# Patient Record
Sex: FEMALE | Race: BLACK OR AFRICAN AMERICAN | URBAN - METROPOLITAN AREA
[De-identification: names, ages, dates, MRNs, and addresses within clinical notes are randomized per-mention and may not be internally consistent; named-entity substitution may affect disease eponyms.]

---

## 2017-01-18 RX ORDER — FUROSEMIDE 20 MG/1
TABLET ORAL
Qty: 90 TAB | Refills: 2 | Status: SHIPPED | OUTPATIENT
Start: 2017-01-18 | End: 2017-12-14 | Stop reason: SDUPTHER

## 2017-01-18 RX ORDER — PANTOPRAZOLE SODIUM 40 MG/1
40 TABLET, DELAYED RELEASE ORAL DAILY
Qty: 90 TAB | Refills: 2 | Status: SHIPPED | OUTPATIENT
Start: 2017-01-18 | End: 2017-10-19 | Stop reason: SDUPTHER

## 2017-02-21 ENCOUNTER — OFFICE VISIT (OUTPATIENT)
Dept: FAMILY MEDICINE CLINIC | Age: 62
End: 2017-02-21

## 2017-02-21 VITALS
WEIGHT: 194.6 LBS | TEMPERATURE: 97.4 F | DIASTOLIC BLOOD PRESSURE: 70 MMHG | HEART RATE: 70 BPM | RESPIRATION RATE: 22 BRPM | HEIGHT: 63 IN | SYSTOLIC BLOOD PRESSURE: 128 MMHG | BODY MASS INDEX: 34.48 KG/M2 | OXYGEN SATURATION: 98 %

## 2017-02-21 DIAGNOSIS — Z11.59 NEED FOR HEPATITIS C SCREENING TEST: ICD-10-CM

## 2017-02-21 DIAGNOSIS — Z23 ENCOUNTER FOR IMMUNIZATION: ICD-10-CM

## 2017-02-21 DIAGNOSIS — Z13.39 SCREENING FOR ALCOHOLISM: ICD-10-CM

## 2017-02-21 DIAGNOSIS — Z12.31 ENCOUNTER FOR SCREENING MAMMOGRAM FOR MALIGNANT NEOPLASM OF BREAST: ICD-10-CM

## 2017-02-21 DIAGNOSIS — E55.9 HYPOVITAMINOSIS D: ICD-10-CM

## 2017-02-21 DIAGNOSIS — Z13.1 SCREENING FOR DIABETES MELLITUS: ICD-10-CM

## 2017-02-21 DIAGNOSIS — Z13.6 SCREENING FOR ISCHEMIC HEART DISEASE: ICD-10-CM

## 2017-02-21 DIAGNOSIS — Z00.00 ROUTINE GENERAL MEDICAL EXAMINATION AT A HEALTH CARE FACILITY: Primary | ICD-10-CM

## 2017-02-21 DIAGNOSIS — Z71.89 ADVANCE CARE PLANNING: ICD-10-CM

## 2017-02-21 RX ORDER — DIFLUPREDNATE 0.5 MG/ML
1 EMULSION OPHTHALMIC
COMMUNITY
End: 2018-09-18 | Stop reason: ALTCHOICE

## 2017-02-21 NOTE — PROGRESS NOTES
Chuyita Bennett is a 62 y.o. female  Chief Complaint   Patient presents with   Jeremy Mendoza Annual Wellness Visit       1. Have you been to the ER, urgent care clinic since your last visit? Hospitalized since your last visit? No    2. Have you seen or consulted any other health care providers outside of the [address] [place] [address] since your last visit? Include any pap smears or colon screening.  No

## 2017-02-21 NOTE — PATIENT INSTRUCTIONS
Medicare Part B Preventive Services Limitations Recommendation Scheduled   Bone Mass Measurement  (age 72 & older, biennial) Requires diagnosis related to osteoporosis or estrogen deficiency. Biennial benefit unless patient has history of long-term glucocorticoid tx or baseline is needed because initial test was by other method     Cardiovascular Screening Blood Tests (every 5 years)  Total cholesterol, HDL, Triglycerides Order as a panel if possible     Colorectal Cancer Screening  -Fecal occult blood test (annual)  -Flexible sigmoidoscopy (5y)  -Screening colonoscopy (10y)  -Barium Enema      Counseling to Prevent Tobacco Use (up to 8 sessions per year)  - Counseling greater than 3 and up to 10 minutes  - Counseling greater than 10 minutes Patients must be asymptomatic of tobacco-related conditions to receive as preventive service     Diabetes Screening Tests (at least every 3 years, Medicare covers annually or at 6-month intervals for prediabetic patients)    Fasting blood sugar (FBS) or glucose tolerance test (GTT) Patient must be diagnosed with one of the following:  -Hypertension, Dyslipidemia, obesity, previous impaired FBS or GTT  Or any two of the following: overweight, FH of diabetes, age ? 72, history of gestational diabetes, birth of baby weighing more than 9 pounds     Diabetes Self-Management Training (DSMT) (no USPSTF recommendation) Requires referral by treating physician for patient with diabetes or renal disease. 10 hours of initial DSMT session of no less than 30 minutes each in a continuous 12-month period. 2 hours of follow-up DSMT in subsequent years.      Glaucoma Screening (no USPSTF recommendation) Diabetes mellitus, family history, , age 48 or over,  American, age 72 or over     Human Immunodeficiency Virus (HIV) Screening (annually for increased risk patients)  HIV-1 and HIV-2 by EIA, GAL, rapid antibody test, or oral mucosa transudate Patient must be at increased risk for HIV infection per USPSTF guidelines or pregnant. Tests covered annually for patients at increased risk. Pregnant patients may receive up to 3 test during pregnancy. Medical Nutrition Therapy (MNT) (for diabetes or renal disease not recommended schedule) Requires referral by treating physician for patient with diabetes or renal disease. Can be provided in same year as diabetes self-management training (DSMT), and CMS recommends medical nutrition therapy take place after DSMT. Up to 3 hours for initial year and 2 hours in subsequent years. Shingles Vaccination A shingles vaccine is also recommended once in a lifetime after age 61     Seasonal Influenza Vaccination (annually)      Pneumococcal Vaccination (once after 72)      Hepatitis B Vaccinations (if medium/high risk) Medium/high risk factors:  End-stage renal disease,  Hemophiliacs who received Factor VIII or IX concentrates, Clients of institutions for the mentally retarded, Persons who live in the same house as a HepB virus carrier, Homosexual men, Illicit injectable drug abusers. Screening Mammography (biennial age 54-69) Annually (age 36 or over)     Screening Pap Tests and Pelvic Examination (up to age 79 and after 79 if unknown history or abnormal study last 10 years) Every 25 months except high risk     Ultrasound Screening for Abdominal Aortic Aneurysm (AAA) (once) Patient must be referred through [facility] and not have had a screening for abdominal aortic aneurysm before under Medicare.   Limited to patients who meet one of the following criteria:  - Men who are 73-68 years old and have smoked more than 100 cigarettes in their lifetime.  -Anyone with a FH of AAA  -Anyone recommended for screening by USPSTF

## 2017-02-21 NOTE — PROGRESS NOTES
This is a Subsequent Medicare Annual Wellness Visit providing Personalized Prevention Plan Services (PPPS) (Performed 12 months after initial AWV and PPPS )    I have reviewed the patient's medical history in detail and updated the computerized patient record. History     Past Medical History   Diagnosis Date    Advance care planning 2/17/2016     Discussed with pt today and handout given to pt to complete. 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 No pertinent past surgical history. Current Outpatient Prescriptions   Medication Sig Dispense Refill    bromfenac (PROLENSA) 0.07 % ophthalmic solution Administer 1 Drop to both eyes daily.  Difluprednate (DUREZOL) 0.05 % ophthalmic emulsion Administer 1 Drop to both eyes.  raNITIdine (ZANTAC) 150 mg tablet TAKE ONE TABLET BY MOUTH IN THE EVENING 90 Tab 2    furosemide (LASIX) 20 mg tablet TAKE ONE TABLET BY MOUTH ONCE DAILY 90 Tab 2    pantoprazole (PROTONIX) 40 mg tablet Take 1 Tab by mouth daily.  90 Tab 2    TOVIAZ 8 mg ER tablet TAKE ONE TABLET BY MOUTH ONCE DAILY 90 Tab 2    KLOR-CON M20 20 mEq tablet TAKE ONE TABLET BY MOUTH ONCE DAILY 90 Tab 2    meloxicam (MOBIC) 15 mg tablet TAKE ONE TABLET BY MOUTH ONCE DAILY 90 Tab 2    VOLTAREN 1 % gel APPLY 4G TO AFFECTED AREA FOUR TIMES DAILY 400 g 2    fluocinoNIDE (LIDEX) 0.05 % topical cream APPLY  CREAM TOPICALLY TO AFFECTED AREA TWICE DAILY 45 g 1    ammonium lactate (AMLACTIN) 12 % topical cream APPLY CREAM TOPICALLY TO AFFECTED AREA TWICE DAILY. (RUB IN AFFECTED AREA WELL) 385 g 2    CRESTOR 10 mg tablet TAKE ONE TABLET BY MOUTH AT BEDTIME 90 Tab 2    carvedilol (COREG) 25 mg tablet TAKE ONE TABLET BY MOUTH IN THE MORNING AND ONE-HALF IN THE EVENING WITH MEALS 135 Tab 2    levalbuterol tartrate (XOPENEX) 45 mcg/actuation inhaler Take 2 Puffs by inhalation every six (6) hours as needed for Wheezing. 1 Inhaler 2    levalbuterol (XOPENEX) 0.63 mg/3 mL nebu 3 mL by Nebulization route four (4) times daily as needed. Dx 493.9,786.05, 135 1 Package 3    cyclobenzaprine (FLEXERIL) 5 mg tablet Take 1 Tab by mouth three (3) times daily as needed for Muscle Spasm(s). 40 Tab 1    BIOTIN PO Take  by mouth daily.  losartan (COZAAR) 50 mg tablet Take 25 mg by mouth daily.  NITROSTAT 0.4 mg SL tablet DISSOLVE ONE TABLET UNDER THE TONGUE EVERY 5 MINUTES AS NEEDED FOR CHEST PAIN. DO NOT EXCEED A TOTAL OF 3 DOSES IN 15 MINUTES 50 tablet 0    fluticasone (FLONASE) 50 mcg/actuation nasal spray 2 Sprays by Both Nostrils route daily. 3 Bottle 3    fexofenadine (ALLEGRA) 180 mg tablet Take  by mouth daily.  ipratropium (ATROVENT) 0.02 % nebulizer solution 2.5 mL by Nebulization route as needed for Wheezing. 2.5 mL 0    cholecalciferol, vitamin d3, (VITAMIN D) 1,000 unit tablet Take 2,000 Units by mouth daily.  aspirin delayed-release 81 mg tablet Take 81 mg by mouth daily.  multivitamin (ONE A DAY) tablet Take 1 Tab by mouth daily.  DOCOSAHEXANOIC ACID/EPA (FISH OIL PO) Take  by mouth daily. 4 tabs daily.  cyclosporin (RESTASIS) 0.05 % ophthalmic emulsion Administer 1 Drop to both eyes two (2) times a day. Allergies   Allergen Reactions    Asa-Acetaminophen-Caff-Potass Other (comments)     GI upset    Bextra [Valdecoxib] Other (comments)     GI upset    Celebrex [Celecoxib] Other (comments)     GI upset    Naproxen Rash    Pcn [Penicillins] Swelling    Sulfur Rash     History reviewed.  No pertinent family history. Social History   Substance Use Topics    Smoking status: Former Smoker     Quit date: 8/1/1986    Smokeless tobacco: Former User    Alcohol use No     Patient Active Problem List   Diagnosis Code    Arthritis M19.90    CAD (coronary artery disease) I25.10    GERD (gastroesophageal reflux disease) K21.9    Sarcoidosis ([address] D86.9    Ganglion cyst of wrist M67.439    Posterior vitreous detachment H43.819    Hypercholesterolemia E78.00    Urinary incontinence R32    Allergy, unspecified not elsewhere classified T78.40XA    CHF (congestive heart failure) ([facility]) I50.9    HTN (hypertension) I10    Atlanto-axial subluxation S13.120A    RAD (reactive airway disease) J45.909    Advance care planning Z71.89       Depression Risk Factor Screening:     PHQ 2 / 9, over the last two weeks 2/21/2017   Little interest or pleasure in doing things Not at all   Feeling down, depressed or hopeless Not at all   Total Score PHQ 2 0     Alcohol Risk Factor Screening: On any occasion during the past 3 months, have you had more than 3 drinks containing alcohol? No    Do you average more than 7 drinks per week? No      Functional Ability and Level of Safety:     Hearing Loss   none    Activities of Daily Living   Self-care. Requires assistance with: no ADLs    Fall Risk   No flowsheet data found.   Abuse Screen   Patient is not abused    Review of Systems   Constitutional: negative  Eyes: negative  Ears, nose, mouth, throat, and face: negative  Respiratory: negative  Cardiovascular: negative  Gastrointestinal: negative  Genitourinary:negative  Integument/breast: negative  Hematologic/lymphatic: negative  Musculoskeletal:positive for arthralgias and stiff joints  Neurological: negative  Behavioral/Psych: negative  Endocrine: negative  Allergic/Immunologic: negative    Physical Examination     Evaluation of Cognitive Function:  Mood/affect:  happy  Appearance: age appropriate  Family member/caregiver input: none    Visit Vitals    /70 (BP 1 Location: Right arm, BP Patient Position: Sitting)    Pulse 70    Temp 97.4 °F (36.3 °C) (Oral)    Resp 22    Ht 5' 3\" (1.6 m)    Wt 194 lb 9.6 oz (88.3 kg)    SpO2 98%    BMI 34.47 kg/m2     General appearance: alert, cooperative, no distress, appears stated age  Ears: normal TM's and external ear canals AU  Throat: Lips, mucosa, and tongue normal. Teeth and gums normal  Neck: supple, symmetrical, trachea midline, no adenopathy, thyroid: not enlarged, symmetric, no tenderness/mass/nodules, no carotid bruit and no JVD  Back: symmetric, no curvature. ROM normal. No CVA tenderness. Lungs: clear to auscultation bilaterally  Heart: regular rate and rhythm, S1, S2 normal, no murmur, click, rub or gallop  Abdomen: soft, non-tender. Bowel sounds normal. No masses,  no organomegaly  Extremities: extremities normal, atraumatic, no cyanosis or edema    Patient Care Team:  Janice Frances MD as PCP - General  Kaleigh Sarmiento MD as Physician (Rheumatology)    Advice/Referrals/Counseling   Education and counseling provided:  Are appropriate based on today's review and evaluation    Assessment/Plan       ICD-10-CM ICD-9-CM    1. Routine general medical examination at a health care facility Z00.00 V70.0    2. Advance care planning Z71.89 V65.49    3. Screening for alcoholism Z13.89 V79.1    4. Encounter for immunization Z23 V03.89    5. Screening for diabetes mellitus Z13.1 V77.1    6. Screening for ischemic heart disease Z13.6 V81.0    7. Encounter for screening mammogram for malignant neoplasm of breast Z12.31 V76.12 [facility] MAMMO BI SCREENING INCL CAD   8. Hypovitaminosis D E55.9 268.9 VITAMIN D, 25 HYDROXY   9. Need for hepatitis C screening test Z11.59 V73.89 HEPATITIS C AB   .     Advance Care Planning (ACP) Provider Conversation Snapshot    Date of ACP Conversation: 02/21/17  Persons included in Conversation:  patient  Length of ACP Conversation in minutes:  <16 minutes (Non-Billable)    Authorized Decision Maker (if patient is incapable of making informed decisions):    This person is:   Healthcare Agent/Medical Power of  under Advance Directive  Her daughter and son            Fernando Smithsvetlana Outcomes / Follow-Up Plan:   Recommended completion of Advance Directive form after review of ACP materials and conversation with prospective healthcare agent

## 2017-02-21 NOTE — MR AVS SNAPSHOT
Visit Information Date & Time Provider Department Dept. Phone Encounter #  
 2/21/2017  8:15 AM Valentino Freed, [facility] [phone] [id number] Follow-up Instructions Return in 4 months (on 6/21/2017). Upcoming Health Maintenance Date Due Hepatitis C Screening 1955 BREAST CANCER SCRN MAMMOGRAM 3/22/2017 DTaP/Tdap/Td series (2 - Td) 2/24/2025 COLONOSCOPY 5/31/2026 Allergies as of 2/21/2017  Review Complete On: 2/21/2017 By: Valentino Freed, MD  
  
 Severity Noted Reaction Type Reactions Asa-acetaminophen-caff-potass  04/08/2010    Other (comments) GI upset Bextra [Valdecoxib]  04/08/2010    Other (comments) GI upset Celebrex [Celecoxib]  04/08/2010    Other (comments) GI upset Naproxen  11/11/2014    Rash Pcn [Penicillins]  04/08/2010    Swelling Sulfur  04/08/2010    Rash Current Immunizations  Reviewed on 2/21/2017 Name Date Influenza Vaccine 10/9/2015, 10/15/2014, 10/30/2013 Influenza Vaccine (Quad) PF 10/25/2016  3:39 PM  
 Influenza Vaccine Split 10/11/2012 Influenza Vaccine Whole 11/24/2010 Pneumococcal Polysaccharide (PPSV-23) 11/16/2016 Tdap 2/24/2015 Reviewed by Valentino Freed, MD on 2/21/2017 at  8:26 AM  
 Reviewed by Valentino Freed, MD on 2/21/2017 at  8:36 AM  
You Were Diagnosed With   
  
 Codes Comments Routine general medical examination at a health care facility    -  Primary ICD-10-CM: Z00.00 ICD-9-CM: V70.0 Advance care planning     ICD-10-CM: Z71.89 ICD-9-CM: V65.49 Screening for alcoholism     ICD-10-CM: Z13.89 ICD-9-CM: V79.1 Encounter for immunization     ICD-10-CM: D07 ICD-9-CM: V03.89 Screening for diabetes mellitus     ICD-10-CM: Z13.1 ICD-9-CM: V77.1 Screening for ischemic heart disease     ICD-10-CM: Z13.6 ICD-9-CM: V81.0  Encounter for screening mammogram for malignant neoplasm of breast     ICD-10-CM: Z12.31 
 ICD-9-CM: I18.22 Hypovitaminosis D     ICD-10-CM: E55.9 ICD-9-CM: 268.9 Need for hepatitis C screening test     ICD-10-CM: Z11.59 
ICD-9-CM: V73.89 Vitals BP Pulse Temp Resp Height(growth percentile) Weight(growth percentile) 128/70 (BP 1 Location: Right arm, BP Patient Position: Sitting) 70 97.4 °F (36.3 °C) (Oral) 22 5' 3\" (1.6 m) 194 lb 9.6 oz (88.3 kg) SpO2 BMI OB Status Smoking Status 98% 34.47 kg/m2 Hysterectomy Former Smoker Vitals History BMI and BSA Data Body Mass Index Body Surface Area 34.47 kg/m 2 1.98 m 2 Preferred Pharmacy Pharmacy Name Phone [facility] [address],  [address] Your Updated Medication List  
  
   
This list is accurate as of: 2/21/17  8:47 AM.  Always use your most recent med list. ALLEGRA 180 mg tablet Generic drug:  fexofenadine Take  by mouth daily. ammonium lactate 12 % topical cream  
Commonly known as:  AMLACTIN  
APPLY CREAM TOPICALLY TO AFFECTED AREA TWICE DAILY. (RUB IN AFFECTED AREA WELL) aspirin delayed-release 81 mg tablet Take 81 mg by mouth daily. BIOTIN PO Take  by mouth daily. carvedilol 25 mg tablet Commonly known as:  COREG  
TAKE ONE TABLET BY MOUTH IN THE MORNING AND ONE-HALF IN THE EVENING WITH MEALS  
  
 CRESTOR 10 mg tablet Generic drug:  rosuvastatin TAKE ONE TABLET BY MOUTH AT BEDTIME  
  
 cyclobenzaprine 5 mg tablet Commonly known as:  FLEXERIL Take 1 Tab by mouth three (3) times daily as needed for Muscle Spasm(s). DUREZOL 0.05 % ophthalmic emulsion Generic drug:  Difluprednate Administer 1 Drop to both eyes. FISH OIL PO Take  by mouth daily. 4 tabs daily. fluocinoNIDE 0.05 % topical cream  
Commonly known as:  LIDEX APPLY  CREAM TOPICALLY TO AFFECTED AREA TWICE DAILY  
  
 fluticasone 50 mcg/actuation nasal spray Commonly known as:  Lennice Boss 2 Sprays by Both Nostrils route daily. furosemide 20 mg tablet Commonly known as:  LASIX TAKE ONE TABLET BY MOUTH ONCE DAILY  
  
 ipratropium 0.02 % nebulizer solution Commonly known as:  ATROVENT  
2.5 mL by Nebulization route as needed for Wheezing. KLOR-CON M20 20 mEq tablet Generic drug:  potassium chloride TAKE ONE TABLET BY MOUTH ONCE DAILY  
  
 levalbuterol 0.63 mg/3 mL Nebu Commonly known as:  XOPENEX  
3 mL by Nebulization route four (4) times daily as needed. Dx 493.9,786.05, 135  
  
 levalbuterol tartrate 45 mcg/actuation inhaler Commonly known as:  Maci Area Take 2 Puffs by inhalation every six (6) hours as needed for Wheezing. losartan 50 mg tablet Commonly known as:  COZAAR Take 25 mg by mouth daily. meloxicam 15 mg tablet Commonly known as:  MOBIC  
TAKE ONE TABLET BY MOUTH ONCE DAILY  
  
 multivitamin tablet Commonly known as:  ONE A DAY Take 1 Tab by mouth daily. NITROSTAT 0.4 mg SL tablet Generic drug:  nitroglycerin DISSOLVE ONE TABLET UNDER THE TONGUE EVERY 5 MINUTES AS NEEDED FOR CHEST PAIN. DO NOT EXCEED A TOTAL OF 3 DOSES IN 15 MINUTES  
  
 pantoprazole 40 mg tablet Commonly known as:  PROTONIX Take 1 Tab by mouth daily. pneumococcal 13 merari conj dip 0.5 mL Syrg injection Commonly known as:  PREVNAR 13 (PF)  
0.5 mL by IntraMUSCular route once for 1 dose. PROLENSA 0.07 % ophthalmic solution Generic drug:  bromfenac Administer 1 Drop to both eyes daily. raNITIdine 150 mg tablet Commonly known as:  ZANTAC TAKE ONE TABLET BY MOUTH IN THE EVENING  
  
 RESTASIS 0.05 % ophthalmic emulsion Generic drug:  cycloSPORINE Administer 1 Drop to both eyes two (2) times a day. TOVIAZ 8 mg ER tablet Generic drug:  fesoterodine TAKE ONE TABLET BY MOUTH ONCE DAILY  
  
 VITAMIN D3 1,000 unit tablet Generic drug:  cholecalciferol Take 2,000 Units by mouth daily. VOLTAREN 1 % Gel Generic drug:  diclofenac APPLY 4G TO AFFECTED AREA FOUR TIMES DAILY Prescriptions Printed Refills  
 pneumococcal 13 merari conj dip (PREVNAR 13, PF,) 0.5 mL syrg injection 0 Si.5 mL by IntraMUSCular route once for 1 dose. Class: Print Route: IntraMUSCular Follow-up Instructions Return in 4 months (on 2017). To-Do List   
 2017 Lab:  HEPATITIS C AB   
  
 2017 Lab:  VITAMIN D, 25 HYDROXY   
  
 2017 Imaging:  [facility] MAMMO BI SCREENING INCL CAD Patient Instructions Medicare Part B Preventive Services Limitations Recommendation Scheduled Bone Mass Measurement 
(age 72 & older, biennial) Requires diagnosis related to osteoporosis or estrogen deficiency. Biennial benefit unless patient has history of long-term glucocorticoid tx or baseline is needed because initial test was by other method Cardiovascular Screening Blood Tests (every 5 years) Total cholesterol, HDL, Triglycerides Order as a panel if possible Colorectal Cancer Screening 
-Fecal occult blood test (annual) -Flexible sigmoidoscopy (5y) 
-Screening colonoscopy (10y) -Barium Enema Counseling to Prevent Tobacco Use (up to 8 sessions per year) - Counseling greater than 3 and up to 10 minutes - Counseling greater than 10 minutes Patients must be asymptomatic of tobacco-related conditions to receive as preventive service Diabetes Screening Tests (at least every 3 years, Medicare covers annually or at 6-month intervals for prediabetic patients) Fasting blood sugar (FBS) or glucose tolerance test (GTT) Patient must be diagnosed with one of the following: 
-Hypertension, Dyslipidemia, obesity, previous impaired FBS or GTT 
Or any two of the following: overweight, FH of diabetes, age ? 72, history of gestational diabetes, birth of baby weighing more than 9 pounds Diabetes Self-Management Training (DSMT) (no USPSTF recommendation) Requires referral by treating physician for patient with diabetes or renal disease. 10 hours of initial DSMT session of no less than 30 minutes each in a continuous 12-month period. 2 hours of follow-up DSMT in subsequent years. Glaucoma Screening (no USPSTF recommendation) Diabetes mellitus, family history, , age 48 or over,  American, age 72 or over Human Immunodeficiency Virus (HIV) Screening (annually for increased risk patients) HIV-1 and HIV-2 by EIA, GAL, rapid antibody test, or oral mucosa transudate Patient must be at increased risk for HIV infection per USPSTF guidelines or pregnant. Tests covered annually for patients at increased risk. Pregnant patients may receive up to 3 test during pregnancy. Medical Nutrition Therapy (MNT) (for diabetes or renal disease not recommended schedule) Requires referral by treating physician for patient with diabetes or renal disease. Can be provided in same year as diabetes self-management training (DSMT), and CMS recommends medical nutrition therapy take place after DSMT. Up to 3 hours for initial year and 2 hours in subsequent years. Shingles Vaccination A shingles vaccine is also recommended once in a lifetime after age 61 Seasonal Influenza Vaccination (annually) Pneumococcal Vaccination (once after 65) Hepatitis B Vaccinations (if medium/high risk) Medium/high risk factors:  End-stage renal disease, Hemophiliacs who received Factor VIII or IX concentrates, Clients of institutions for the mentally retarded, Persons who live in the same house as a HepB virus carrier, Homosexual men, Illicit injectable drug abusers. Screening Mammography (biennial age 54-69) Annually (age 36 or over) Screening Pap Tests and Pelvic Examination (up to age 79 and after 79 if unknown history or abnormal study last 10 years) Every 24 months except high risk Ultrasound Screening for Abdominal Aortic Aneurysm (AAA) (once) Patient must be referred through [facility] and not have had a screening for abdominal aortic aneurysm before under Medicare. Limited to patients who meet one of the following criteria: 
- Men who are 73-68 years old and have smoked more than 100 cigarettes in their lifetime. 
-Anyone with a FH of AAA 
-Anyone recommended for screening by USPSTF Introducing [facility] SERVICES! Dear Mark Peterson: Thank you for requesting a [employer] account. Our records indicate that you already have an active [employer] account. You can access your account anytime at [email or web address] Did you know that you can access your hospital and ER discharge instructions at any time in [employer]? You can also review all of your test results from your hospital stay or ER visit. Additional Information If you have questions, please visit the Frequently Asked Questions section of the [employer] website at [email or web address] Remember, [employer] is NOT to be used for urgent needs. For medical emergencies, dial 911. Now available from your iPhone and Android! Please provide this summary of care documentation to your next provider. Your primary care clinician is listed as Isreal [facility]. If you have any questions after today's visit, please call [phone].

## 2017-03-09 ENCOUNTER — HOSPITAL ENCOUNTER (OUTPATIENT)
Dept: LAB | Age: 62
Discharge: HOME | End: 2017-03-09
Payer: MEDICARE

## 2017-03-09 DIAGNOSIS — E55.9 HYPOVITAMINOSIS D: ICD-10-CM

## 2017-03-09 DIAGNOSIS — E78.00 HYPERCHOLESTEROLEMIA: ICD-10-CM

## 2017-03-09 DIAGNOSIS — Z11.59 NEED FOR HEPATITIS C SCREENING TEST: ICD-10-CM

## 2017-03-09 LAB
25(OH)D3 SERPL-MCNC: 50.9 NG/ML (ref 30–100)
ALBUMIN SERPL BCP-MCNC: 3.4 G/DL (ref 3.4–5)
ALBUMIN/GLOB SERPL: 0.8 {RATIO} (ref 0.8–1.7)
ALP SERPL-CCNC: 78 U/L (ref 45–117)
ALT SERPL-CCNC: 35 U/L (ref 13–56)
ANION GAP BLD CALC-SCNC: 6 MMOL/L (ref 3–18)
APPEARANCE UR: ABNORMAL
AST SERPL W P-5'-P-CCNC: 28 U/L (ref 15–37)
BACTERIA URNS QL MICRO: ABNORMAL /HPF
BASOPHILS # BLD AUTO: 0 K/UL (ref 0–0.06)
BASOPHILS # BLD: 0 % (ref 0–2)
BILIRUB DIRECT SERPL-MCNC: 0.3 MG/DL (ref 0–0.2)
BILIRUB SERPL-MCNC: 0.8 MG/DL (ref 0.2–1)
BILIRUB UR QL: NEGATIVE
BUN SERPL-MCNC: 19 MG/DL (ref 7–18)
BUN/CREAT SERPL: 19 (ref 12–20)
CALCIUM SERPL-MCNC: 8.5 MG/DL (ref 8.5–10.1)
CHLORIDE SERPL-SCNC: 102 MMOL/L (ref 100–108)
CHOLEST SERPL-MCNC: 120 MG/DL
CO2 SERPL-SCNC: 30 MMOL/L (ref 21–32)
COLOR UR: YELLOW
CREAT SERPL-MCNC: 1.02 MG/DL (ref 0.6–1.3)
DIFFERENTIAL METHOD BLD: ABNORMAL
EOSINOPHIL # BLD: 0.3 K/UL (ref 0–0.4)
EOSINOPHIL NFR BLD: 10 % (ref 0–5)
EPITH CASTS URNS QL MICRO: ABNORMAL /LPF (ref 0–5)
ERYTHROCYTE [DISTWIDTH] IN BLOOD BY AUTOMATED COUNT: 13.6 % (ref 11.6–14.5)
GLOBULIN SER CALC-MCNC: 4.1 G/DL (ref 2–4)
GLUCOSE SERPL-MCNC: 90 MG/DL (ref 74–99)
GLUCOSE UR STRIP.AUTO-MCNC: NEGATIVE MG/DL
HCT VFR BLD AUTO: 38.7 % (ref 35–45)
HDLC SERPL-MCNC: 61 MG/DL (ref 40–60)
HDLC SERPL: 2 {RATIO} (ref 0–5)
HGB BLD-MCNC: 12.3 G/DL (ref 12–16)
HGB UR QL STRIP: NEGATIVE
KETONES UR QL STRIP.AUTO: NEGATIVE MG/DL
LDLC SERPL CALC-MCNC: 52.2 MG/DL (ref 0–100)
LEUKOCYTE ESTERASE UR QL STRIP.AUTO: ABNORMAL
LIPID PROFILE,FLP: ABNORMAL
LYMPHOCYTES # BLD AUTO: 28 % (ref 21–52)
LYMPHOCYTES # BLD: 0.9 K/UL (ref 0.9–3.6)
MCH RBC QN AUTO: 28.7 PG (ref 24–34)
MCHC RBC AUTO-ENTMCNC: 31.8 G/DL (ref 31–37)
MCV RBC AUTO: 90.4 FL (ref 74–97)
MONOCYTES # BLD: 0.4 K/UL (ref 0.05–1.2)
MONOCYTES NFR BLD AUTO: 12 % (ref 3–10)
NEUTS SEG # BLD: 1.6 K/UL (ref 1.8–8)
NEUTS SEG NFR BLD AUTO: 50 % (ref 40–73)
NITRITE UR QL STRIP.AUTO: POSITIVE
PH UR STRIP: 5.5 [PH] (ref 5–8)
PLATELET # BLD AUTO: 193 K/UL (ref 135–420)
PMV BLD AUTO: 10.5 FL (ref 9.2–11.8)
POTASSIUM SERPL-SCNC: 4.7 MMOL/L (ref 3.5–5.5)
PROT SERPL-MCNC: 7.5 G/DL (ref 6.4–8.2)
PROT UR STRIP-MCNC: NEGATIVE MG/DL
RBC # BLD AUTO: 4.28 M/UL (ref 4.2–5.3)
RBC #/AREA URNS HPF: 0 /HPF (ref 0–5)
SODIUM SERPL-SCNC: 138 MMOL/L (ref 136–145)
SP GR UR REFRACTOMETRY: 1.01 (ref 1–1.03)
T4 FREE SERPL-MCNC: 1.2 NG/DL (ref 0.7–1.5)
TRIGL SERPL-MCNC: 34 MG/DL (ref ?–150)
TSH SERPL DL<=0.05 MIU/L-ACNC: 1.08 UIU/ML (ref 0.36–3.74)
UROBILINOGEN UR QL STRIP.AUTO: 0.2 EU/DL (ref 0.2–1)
VLDLC SERPL CALC-MCNC: 6.8 MG/DL
WBC # BLD AUTO: 3.2 K/UL (ref 4.6–13.2)
WBC URNS QL MICRO: ABNORMAL /HPF (ref 0–4)
YEAST URNS QL MICRO: ABNORMAL

## 2017-03-09 PROCEDURE — 84439 ASSAY OF FREE THYROXINE: CPT | Performed by: INTERNAL MEDICINE

## 2017-03-09 PROCEDURE — 36415 COLL VENOUS BLD VENIPUNCTURE: CPT | Performed by: INTERNAL MEDICINE

## 2017-03-09 PROCEDURE — 80076 HEPATIC FUNCTION PANEL: CPT | Performed by: INTERNAL MEDICINE

## 2017-03-09 PROCEDURE — 80061 LIPID PANEL: CPT | Performed by: INTERNAL MEDICINE

## 2017-03-09 PROCEDURE — 86803 HEPATITIS C AB TEST: CPT | Performed by: INTERNAL MEDICINE

## 2017-03-09 PROCEDURE — 84443 ASSAY THYROID STIM HORMONE: CPT | Performed by: INTERNAL MEDICINE

## 2017-03-09 PROCEDURE — 85025 COMPLETE CBC W/AUTO DIFF WBC: CPT | Performed by: INTERNAL MEDICINE

## 2017-03-09 PROCEDURE — 81001 URINALYSIS AUTO W/SCOPE: CPT | Performed by: INTERNAL MEDICINE

## 2017-03-09 PROCEDURE — 80048 BASIC METABOLIC PNL TOTAL CA: CPT | Performed by: INTERNAL MEDICINE

## 2017-03-09 PROCEDURE — 82306 VITAMIN D 25 HYDROXY: CPT | Performed by: INTERNAL MEDICINE

## 2017-03-10 LAB
HCV AB SER IA-ACNC: 0.09 INDEX
HCV AB SERPL QL IA: NEGATIVE
HCV COMMENT,HCGAC: NORMAL

## 2017-03-14 DIAGNOSIS — Z12.31 ENCOUNTER FOR SCREENING MAMMOGRAM FOR MALIGNANT NEOPLASM OF BREAST: ICD-10-CM

## 2017-03-14 RX ORDER — CYCLOSPORINE 0.5 MG/ML
1 EMULSION OPHTHALMIC 2 TIMES DAILY
Status: CANCELLED | OUTPATIENT
Start: 2017-03-14

## 2017-03-14 NOTE — TELEPHONE ENCOUNTER
Not previously prescribed by dr. Heather Ny. Usually prescribed by opho.   [facility] defer to [facility]

## 2017-03-14 NOTE — TELEPHONE ENCOUNTER
Pt requesting RX refill(s) for:  Requested Prescriptions     Pending Prescriptions Disp Refills    cycloSPORINE (RESTASIS) 0.05 % ophthalmic emulsion       Sig: Administer 1 Drop to both eyes two (2) times a day.      Last refill: Historic provider    Last visit: 02/21/17      Thank you    Garland Villaseñor LPN

## 2017-03-19 NOTE — TELEPHONE ENCOUNTER
Please call pt. She needs to keep getting gher refills with the same doctor who has prescribed the medication in the past.  Please call and let her know.

## 2017-03-20 RX ORDER — NITROFURANTOIN 25; 75 MG/1; MG/1
100 CAPSULE ORAL 2 TIMES DAILY
Qty: 14 CAP | Refills: 0 | Status: SHIPPED | OUTPATIENT
Start: 2017-03-20 | End: 2017-03-26

## 2017-05-23 ENCOUNTER — OFFICE VISIT (OUTPATIENT)
Dept: FAMILY MEDICINE CLINIC | Age: 62
End: 2017-05-23

## 2017-05-23 VITALS
TEMPERATURE: 98.7 F | WEIGHT: 195 LBS | RESPIRATION RATE: 20 BRPM | OXYGEN SATURATION: 98 % | HEART RATE: 70 BPM | SYSTOLIC BLOOD PRESSURE: 126 MMHG | BODY MASS INDEX: 34.55 KG/M2 | HEIGHT: 63 IN | DIASTOLIC BLOOD PRESSURE: 84 MMHG

## 2017-05-23 DIAGNOSIS — I50.22 CHRONIC SYSTOLIC CONGESTIVE HEART FAILURE (HCC): Primary | ICD-10-CM

## 2017-05-23 DIAGNOSIS — E78.00 HYPERCHOLESTEROLEMIA: ICD-10-CM

## 2017-05-23 DIAGNOSIS — I10 ESSENTIAL HYPERTENSION: ICD-10-CM

## 2017-05-23 DIAGNOSIS — J01.11 ACUTE RECURRENT FRONTAL SINUSITIS: ICD-10-CM

## 2017-05-23 DIAGNOSIS — T14.8XXA SKIN ABRASION: ICD-10-CM

## 2017-05-23 RX ORDER — AZITHROMYCIN 250 MG/1
TABLET, FILM COATED ORAL
Qty: 6 TAB | Refills: 0 | Status: SHIPPED | OUTPATIENT
Start: 2017-05-23 | End: 2017-05-28

## 2017-05-23 NOTE — PROGRESS NOTES
Doroteo Chambers is a 65 y.o. female  Patient here for follow up for HTN. Patient believes she has sinus infection. 1. Have you been to the ER, urgent care clinic since your last visit? Hospitalized since your last visit? No    2. Have you seen or consulted any other health care providers outside of the [facility] since your last visit? Include any pap smears or colon screening.  Yes Where: ophthlamology, cardio

## 2017-05-23 NOTE — PROGRESS NOTES
Assessment/Plan:    Jose Elias Johnson was seen today for hypertension. Diagnoses and all orders for this visit:    Chronic systolic congestive heart failure ([address]    Hypercholesterolemia  -     LIPID PANEL; Future  -     HEPATIC FUNCTION PANEL; Future  -     METABOLIC PANEL, BASIC; Future    Essential hypertension    Acute recurrent frontal sinusitis  -     azithromycin (ZITHROMAX) 250 mg tablet; Take 2 tablets today, then take 1 tablet daily    Skin abrasion      Will use neosporin on foot and monitor. F/u in Sept. BW. The plan was discussed with the patient. The patient verbalized understanding and is in agreement with the plan. All medication potential side effects were discussed with the patient.    -------------------------------------------------------------------------------------------------------------------        Lul Mcbride is a 65 y.o. female and presents with Hypertension         Subjective:  Pt here for f/u. CHF: well controlled. No SOB, no chest pain. Cut her foot last week but is doing fine. She walks in her home barefoot. I warned her against doing this and the risk of injury and possible infection. Sinus congestion for 2 weeks, now green phlegm, no fevers. HTN: well controlled. HLD: well controlled. ROS:  Constitutional: No recent weight change. No weakness/fatigue. No f/c. Skin: No rashes, change in nails/hair, itching   HENT: No HA, dizziness. No hearing loss/tinnitus. No nasal congestion/discharge. Eyes: No change in vision, double/blurred vision or eye pain/redness. Cardiovascular: No CP/palpitations. No AMADO/orthopnea/PND. Respiratory: No cough/sputum, dyspnea, wheezing. Gastointestinal: No dysphagia, reflux. No n/v. No constipation/diarrhea. No melena/rectal bleeding. Genitourinary: No dysuria, urinary hesitancy, nocturia, hematuria. No incontinence. Musculoskeletal: No joint pain/stiffness. No muscle pain/tenderness.    Endo: No heat/cold intolerance, no polyuria/polydypsia. Heme: No h/o anemia. No easy bleeding/bruising. Allergy/Immunology: No seasonal rhinitis. Denies frequent colds, sinus/ear infections. Neurological: No seizures/numbness/weakness. No paresthesias. Psychiatric:  No depression, anxiety. The problem list was updated as a part of today's visit. Patient Active Problem List   Diagnosis Code    Arthritis M19.90    CAD (coronary artery disease) I25.10    GERD (gastroesophageal reflux disease) K21.9    Sarcoidosis ([address] D86.9    Ganglion cyst of wrist M67.439    Posterior vitreous detachment H43.819    Hypercholesterolemia E78.00    Urinary incontinence R32    Allergy, unspecified not elsewhere classified T78.40XA    CHF (congestive heart failure) ([facility]) I50.9    HTN (hypertension) I10    Atlanto-axial subluxation S13.120A    RAD (reactive airway disease) J45.909    Advance care planning Z71.89       The PSH,  were reviewed. SH:  Social History   Substance Use Topics    Smoking status: Former Smoker     Quit date: 8/1/1986    Smokeless tobacco: Former User    Alcohol use No       Medications/Allergies:  Current Outpatient Prescriptions on File Prior to Visit   Medication Sig Dispense Refill    carvedilol (COREG) 25 mg tablet TAKE ONE TABLET BY MOUTH IN THE MORNING AND ONE-HALF TAB IN THE EVENING WITH  MEALS 135 Tab 2    bromfenac (PROLENSA) 0.07 % ophthalmic solution Administer 1 Drop to both eyes daily.  Difluprednate (DUREZOL) 0.05 % ophthalmic emulsion Administer 1 Drop to both eyes.  raNITIdine (ZANTAC) 150 mg tablet TAKE ONE TABLET BY MOUTH IN THE EVENING 90 Tab 2    furosemide (LASIX) 20 mg tablet TAKE ONE TABLET BY MOUTH ONCE DAILY 90 Tab 2    pantoprazole (PROTONIX) 40 mg tablet Take 1 Tab by mouth daily.  90 Tab 2    TOVIAZ 8 mg ER tablet TAKE ONE TABLET BY MOUTH ONCE DAILY 90 Tab 2    KLOR-CON M20 20 mEq tablet TAKE ONE TABLET BY MOUTH ONCE DAILY 90 Tab 2    meloxicam (MOBIC) 15 mg tablet TAKE ONE TABLET BY MOUTH ONCE DAILY 90 Tab 2    VOLTAREN 1 % gel APPLY 4G TO AFFECTED AREA FOUR TIMES DAILY 400 g 2    fluocinoNIDE (LIDEX) 0.05 % topical cream APPLY  CREAM TOPICALLY TO AFFECTED AREA TWICE DAILY 45 g 1    ammonium lactate (AMLACTIN) 12 % topical cream APPLY CREAM TOPICALLY TO AFFECTED AREA TWICE DAILY. (RUB IN AFFECTED AREA WELL) 385 g 2    CRESTOR 10 mg tablet TAKE ONE TABLET BY MOUTH AT BEDTIME 90 Tab 2    levalbuterol tartrate (XOPENEX) 45 mcg/actuation inhaler Take 2 Puffs by inhalation every six (6) hours as needed for Wheezing. 1 Inhaler 2    levalbuterol (XOPENEX) 0.63 mg/3 mL nebu 3 mL by Nebulization route four (4) times daily as needed. Dx 493.9,786.05, 135 1 Package 3    cyclobenzaprine (FLEXERIL) 5 mg tablet Take 1 Tab by mouth three (3) times daily as needed for Muscle Spasm(s). 40 Tab 1    BIOTIN PO Take  by mouth daily.  losartan (COZAAR) 50 mg tablet Take 25 mg by mouth daily.  NITROSTAT 0.4 mg SL tablet DISSOLVE ONE TABLET UNDER THE TONGUE EVERY 5 MINUTES AS NEEDED FOR CHEST PAIN. DO NOT EXCEED A TOTAL OF 3 DOSES IN 15 MINUTES 50 tablet 0    fluticasone (FLONASE) 50 mcg/actuation nasal spray 2 Sprays by Both Nostrils route daily. 3 Bottle 3    fexofenadine (ALLEGRA) 180 mg tablet Take  by mouth daily.  ipratropium (ATROVENT) 0.02 % nebulizer solution 2.5 mL by Nebulization route as needed for Wheezing. 2.5 mL 0    cholecalciferol, vitamin d3, (VITAMIN D) 1,000 unit tablet Take 2,000 Units by mouth daily.  aspirin delayed-release 81 mg tablet Take 81 mg by mouth daily.  multivitamin (ONE A DAY) tablet Take 1 Tab by mouth daily.  DOCOSAHEXANOIC ACID/EPA (FISH OIL PO) Take  by mouth daily. 4 tabs daily.  cyclosporin (RESTASIS) 0.05 % ophthalmic emulsion Administer 1 Drop to both eyes two (2) times a day. No current facility-administered medications on file prior to visit.          Allergies   Allergen Reactions  Asa-Acetaminophen-Caff-Potass Other (comments)     GI upset    Bextra [Valdecoxib] Other (comments)     GI upset    Celebrex [Celecoxib] Other (comments)     GI upset    Naproxen Rash    Pcn [Penicillins] Swelling    Sulfur Rash         Health Maintenance:   Health Maintenance   Topic Date Due    INFLUENZA AGE 9 TO ADULT  08/01/2017    BREAST CANCER SCRN MAMMOGRAM  03/07/2018    DTaP/Tdap/Td series (2 - Td) 02/24/2025    COLONOSCOPY  05/31/2026    Hepatitis C Screening  Completed    ZOSTER VACCINE AGE 60>  Addressed    Pneumococcal 19-64 Medium Risk  Completed       Objective:  Visit Vitals    /84    Pulse 70    Temp 98.7 °F (37.1 °C) (Oral)    Resp 20    Ht 5' 3\" (1.6 m)    Wt 195 lb (88.5 kg)    SpO2 98%    BMI 34.54 kg/m2          Nurses notes and VS reviewed. Physical Examination: General appearance - alert, well appearing, and in no distress  Chest - clear to auscultation, no wheezes, rales or rhonchi, symmetric air entry  Heart - normal rate, regular rhythm, normal S1, S2, no murmurs, rubs, clicks or gallops        Labwork and Ancillary Studies:    CBC w/Diff  Lab Results   Component Value Date/Time    WBC 3.2 03/09/2017 07:45 AM    HGB 12.3 03/09/2017 07:45 AM    PLATELET 119 72/17/9329 07:45 AM         Basic Metabolic Profile  Lab Results   Component Value Date/Time    Sodium 138 03/09/2017 07:45 AM    Potassium 4.7 03/09/2017 07:45 AM    Chloride 102 03/09/2017 07:45 AM    CO2 30 03/09/2017 07:45 AM    Anion gap 6 03/09/2017 07:45 AM    Glucose 90 03/09/2017 07:45 AM    BUN 19 03/09/2017 07:45 AM    Creatinine 1.02 03/09/2017 07:45 AM    BUN/Creatinine ratio 19 03/09/2017 07:45 AM    GFR est AA >60 03/09/2017 07:45 AM    GFR est non-AA 55 03/09/2017 07:45 AM    Calcium 8.5 03/09/2017 07:45 AM         LFT  Lab Results   Component Value Date/Time    ALT (SGPT) 35 03/09/2017 07:45 AM    AST (SGOT) 28 03/09/2017 07:45 AM    Alk.  phosphatase 78 03/09/2017 07:45 AM    Bilirubin, direct 0.3 03/09/2017 07:45 AM    Bilirubin, total 0.8 03/09/2017 07:45 AM         Cholesterol  Lab Results   Component Value Date/Time    Cholesterol, total 120 03/09/2017 07:45 AM    HDL Cholesterol 61 03/09/2017 07:45 AM    LDL, calculated 52.2 03/09/2017 07:45 AM    Triglyceride 34 03/09/2017 07:45 AM    CHOL/HDL Ratio 2.0 03/09/2017 07:45 AM           Advance Care Planning (ACP) Provider Conversation Snapshot    Date of ACP Conversation: 05/23/17  Persons included in Conversation:  patient  Length of ACP Conversation in minutes:  16 minutes    Authorized Decision Maker (if patient is incapable of making informed decisions):    This person is:   Healthcare Agent/Medical Power of  under Advance Directive        Conversation Outcomes / Follow-Up Plan:   Recommended completion of Advance Directive form after review of ACP materials and conversation with prospective healthcare agent

## 2017-05-23 NOTE — PATIENT INSTRUCTIONS

## 2017-05-23 NOTE — MR AVS SNAPSHOT
Visit Information Date & Time Provider Department Dept. Phone Encounter #  
 5/23/2017  7:30 AM Nellie Pierce, [address] [phone] [id number] Upcoming Health Maintenance Date Due INFLUENZA AGE 9 TO ADULT 8/1/2017 BREAST CANCER SCRN MAMMOGRAM 3/7/2018 DTaP/Tdap/Td series (2 - Td) 2/24/2025 COLONOSCOPY 5/31/2026 Allergies as of 5/23/2017  Review Complete On: 5/23/2017 By: Nellie Pierce MD  
  
 Severity Noted Reaction Type Reactions Asa-acetaminophen-caff-potass  04/08/2010    Other (comments) GI upset Bextra [Valdecoxib]  04/08/2010    Other (comments) GI upset Celebrex [Celecoxib]  04/08/2010    Other (comments) GI upset Naproxen  11/11/2014    Rash Pcn [Penicillins]  04/08/2010    Swelling Sulfur  04/08/2010    Rash Current Immunizations  Reviewed on 2/21/2017 Name Date Influenza Vaccine 10/9/2015, 10/15/2014, 10/30/2013 Influenza Vaccine (Quad) PF 10/25/2016  3:39 PM  
 Influenza Vaccine Split 10/11/2012 Influenza Vaccine Whole 11/24/2010 Pneumococcal Polysaccharide (PPSV-23) 11/16/2016 Tdap 2/24/2015 Not reviewed this visit You Were Diagnosed With   
  
 Codes Comments Chronic systolic congestive heart failure (HCC)    -  Primary ICD-10-CM: E28.39 ICD-9-CM: 428.22, 428.0 Hypercholesterolemia     ICD-10-CM: E78.00 ICD-9-CM: 272.0 Essential hypertension     ICD-10-CM: I10 
ICD-9-CM: 401.9 Acute recurrent frontal sinusitis     ICD-10-CM: J01.11 
ICD-9-CM: 461.1 Vitals BP Pulse Temp Resp Height(growth percentile) Weight(growth percentile) 126/84 70 98.7 °F (37.1 °C) (Oral) 20 5' 3\" (1.6 m) 195 lb (88.5 kg) SpO2 BMI OB Status Smoking Status 98% 34.54 kg/m2 Hysterectomy Former Smoker BMI and BSA Data Body Mass Index Body Surface Area 34.54 kg/m 2 1.98 m 2 Preferred Pharmacy Pharmacy Name Phone [facility] [address] [facility],  [address] Your Updated Medication List  
  
   
This list is accurate as of: 5/23/17  7:54 AM.  Always use your most recent med list. ALLEGRA 180 mg tablet Generic drug:  fexofenadine Take  by mouth daily. ammonium lactate 12 % topical cream  
Commonly known as:  AMLACTIN  
APPLY CREAM TOPICALLY TO AFFECTED AREA TWICE DAILY. (RUB IN AFFECTED AREA WELL) aspirin delayed-release 81 mg tablet Take 81 mg by mouth daily. azithromycin 250 mg tablet Commonly known as:  Tanya Carls Take 2 tablets today, then take 1 tablet daily BIOTIN PO Take  by mouth daily. carvedilol 25 mg tablet Commonly known as:  COREG  
TAKE ONE TABLET BY MOUTH IN THE MORNING AND ONE-HALF TAB IN THE EVENING WITH  MEALS  
  
 CRESTOR 10 mg tablet Generic drug:  rosuvastatin TAKE ONE TABLET BY MOUTH AT BEDTIME  
  
 cyclobenzaprine 5 mg tablet Commonly known as:  FLEXERIL Take 1 Tab by mouth three (3) times daily as needed for Muscle Spasm(s). DUREZOL 0.05 % ophthalmic emulsion Generic drug:  Difluprednate Administer 1 Drop to both eyes. FISH OIL PO Take  by mouth daily. 4 tabs daily. fluocinoNIDE 0.05 % topical cream  
Commonly known as:  LIDEX APPLY  CREAM TOPICALLY TO AFFECTED AREA TWICE DAILY  
  
 fluticasone 50 mcg/actuation nasal spray Commonly known as:  Julián Petite 2 Sprays by Both Nostrils route daily. furosemide 20 mg tablet Commonly known as:  LASIX TAKE ONE TABLET BY MOUTH ONCE DAILY  
  
 ipratropium 0.02 % nebulizer solution Commonly known as:  ATROVENT  
2.5 mL by Nebulization route as needed for Wheezing. KLOR-CON M20 20 mEq tablet Generic drug:  potassium chloride TAKE ONE TABLET BY MOUTH ONCE DAILY  
  
 levalbuterol 0.63 mg/3 mL Nebu Commonly known as:  XOPENEX  
3 mL by Nebulization route four (4) times daily as needed.  Dx 493.9,786.05, 135  
  
 levalbuterol tartrate 45 mcg/actuation inhaler Commonly known as:  Lamont Avalos Take 2 Puffs by inhalation every six (6) hours as needed for Wheezing. losartan 50 mg tablet Commonly known as:  COZAAR Take 25 mg by mouth daily. meloxicam 15 mg tablet Commonly known as:  MOBIC  
TAKE ONE TABLET BY MOUTH ONCE DAILY  
  
 multivitamin tablet Commonly known as:  ONE A DAY Take 1 Tab by mouth daily. NITROSTAT 0.4 mg SL tablet Generic drug:  nitroglycerin DISSOLVE ONE TABLET UNDER THE TONGUE EVERY 5 MINUTES AS NEEDED FOR CHEST PAIN. DO NOT EXCEED A TOTAL OF 3 DOSES IN 15 MINUTES  
  
 pantoprazole 40 mg tablet Commonly known as:  PROTONIX Take 1 Tab by mouth daily. PROLENSA 0.07 % ophthalmic solution Generic drug:  bromfenac Administer 1 Drop to both eyes daily. raNITIdine 150 mg tablet Commonly known as:  ZANTAC TAKE ONE TABLET BY MOUTH IN THE EVENING  
  
 RESTASIS 0.05 % ophthalmic emulsion Generic drug:  cycloSPORINE Administer 1 Drop to both eyes two (2) times a day. TOVIAZ 8 mg ER tablet Generic drug:  fesoterodine TAKE ONE TABLET BY MOUTH ONCE DAILY  
  
 VITAMIN D3 1,000 unit tablet Generic drug:  cholecalciferol Take 2,000 Units by mouth daily. VOLTAREN 1 % Gel Generic drug:  diclofenac APPLY 4G TO AFFECTED AREA FOUR TIMES DAILY Prescriptions Sent to Pharmacy Refills  
 azithromycin (ZITHROMAX) 250 mg tablet 0 Sig: Take 2 tablets today, then take 1 tablet daily Class: Normal  
 Pharmacy: [address] [place] [address], [address] [place] [address]  #: [phone] Patient Instructions High Blood Pressure: Care Instructions Your Care Instructions If your blood pressure is usually above 140/90, you have high blood pressure, or hypertension. That means the top number is 140 or higher or the bottom number is 90 or higher, or both. Despite what a lot of people think, high blood pressure usually doesn't cause headaches or make you feel dizzy or lightheaded. It usually has no symptoms. But it does increase your risk for heart attack, stroke, and kidney or eye damage. The higher your blood pressure, the more your risk increases. Your doctor will give you a goal for your blood pressure. Your goal will be based on your health and your age. An example of a goal is to keep your blood pressure below 140/90. Lifestyle changes, such as eating healthy and being active, are always important to help lower blood pressure. You might also take medicine to reach your blood pressure goal. 
Follow-up care is a key part of your treatment and safety. Be sure to make and go to all appointments, and call your doctor if you are having problems. It's also a good idea to know your test results and keep a list of the medicines you take. How can you care for yourself at home? Medical treatment · If you stop taking your medicine, your blood pressure will go back up. You may take one or more types of medicine to lower your blood pressure. Be safe with medicines. Take your medicine exactly as prescribed. Call your doctor if you think you are having a problem with your medicine. · Talk to your doctor before you start taking aspirin every day. Aspirin can help certain people lower their risk of a heart attack or stroke. But taking aspirin isn't right for everyone, because it can cause serious bleeding. · See your doctor regularly. You may need to see the doctor more often at first or until your blood pressure comes down. · If you are taking blood pressure medicine, talk to your doctor before you take decongestants or anti-inflammatory medicine, such as ibuprofen. Some of these medicines can raise blood pressure. · Learn how to check your blood pressure at home. Lifestyle changes · Stay at a healthy weight.  This is especially important if you put on weight around the waist. Losing even 10 pounds can help you lower your blood pressure. · If your doctor recommends it, get more exercise. Walking is a good choice. Bit by bit, increase the amount you walk every day. Try for at least 30 minutes on most days of the week. You also may want to swim, bike, or do other activities. · Avoid or limit alcohol. Talk to your doctor about whether you can drink any alcohol. · Try to limit how much sodium you eat to less than 2,300 milligrams (mg) a day. Your doctor may ask you to try to eat less than 1,500 mg a day. · Eat plenty of fruits (such as bananas and oranges), vegetables, legumes, whole grains, and low-fat dairy products. · Lower the amount of saturated fat in your diet. Saturated fat is found in animal products such as milk, cheese, and meat. Limiting these foods may help you lose weight and also lower your risk for heart disease. · Do not smoke. Smoking increases your risk for heart attack and stroke. If you need help quitting, talk to your doctor about stop-smoking programs and medicines. These can increase your chances of quitting for good. When should you call for help? Call 911 anytime you think you may need emergency care. This may mean having symptoms that suggest that your blood pressure is causing a serious heart or blood vessel problem. Your blood pressure may be over 180/110. For example, call 911 if: 
· You have symptoms of a heart attack. These may include: ¨ Chest pain or pressure, or a strange feeling in the chest. 
¨ Sweating. ¨ Shortness of breath. ¨ Nausea or vomiting. ¨ Pain, pressure, or a strange feeling in the back, neck, jaw, or upper belly or in one or both shoulders or arms. ¨ Lightheadedness or sudden weakness. ¨ A fast or irregular heartbeat. · You have symptoms of a stroke. These may include: 
¨ Sudden numbness, tingling, weakness, or loss of movement in your face, arm, or leg, especially on only one side of your body. ¨ Sudden vision changes. ¨ Sudden trouble speaking. ¨ Sudden confusion or trouble understanding simple statements. ¨ Sudden problems with walking or balance. ¨ A sudden, severe headache that is different from past headaches. · You have severe back or belly pain. Do not wait until your blood pressure comes down on its own. Get help right away. Call your doctor now or seek immediate care if: 
· Your blood pressure is much higher than normal (such as 180/110 or higher), but you don't have symptoms. · You think high blood pressure is causing symptoms, such as: ¨ Severe headache. ¨ Blurry vision. Watch closely for changes in your health, and be sure to contact your doctor if: 
· Your blood pressure measures 140/90 or higher at least 2 times. That means the top number is 140 or higher or the bottom number is 90 or higher, or both. · You think you may be having side effects from your blood pressure medicine. · Your blood pressure is usually normal, but it goes above normal at least 2 times. Where can you learn more? Go to http://john-yasir.info/. Enter Q354 in the search box to learn more about \"High Blood Pressure: Care Instructions. \" Current as of: August 8, 2016 Content Version: 11.2 © 5503-8105 [employer]. Care instructions adapted under license by [employer] (which disclaims liability or warranty for this information). If you have questions about a medical condition or this instruction, always ask your healthcare professional. [place] [address] any warranty or liability for your use of this information. Introducing [facility]! Dear Anup Satna: Thank you for requesting a [employer] account. Our records indicate that you already have an active [employer] account. You can access your account anytime at [email or web address] Did you know that you can access your hospital and ER discharge instructions at any time in [employer]? You can also review all of your test results from your hospital stay or ER visit. Additional Information If you have questions, please visit the Frequently Asked Questions section of the [employer] website at [email or web address] Remember, [employer] is NOT to be used for urgent needs. For medical emergencies, dial 911. Now available from your iPhone and Android! Please provide this summary of care documentation to your next provider. Your primary care clinician is listed as [id number]. If you have any questions after today's visit, please call [phone].

## 2017-08-01 DIAGNOSIS — N32.81 OAB (OVERACTIVE BLADDER): ICD-10-CM

## 2017-08-01 RX ORDER — FESOTERODINE FUMARATE 8 MG/1
TABLET, FILM COATED, EXTENDED RELEASE ORAL
Qty: 90 TAB | Refills: 2 | Status: SHIPPED | OUTPATIENT
Start: 2017-08-01 | End: 2018-04-30 | Stop reason: SDUPTHER

## 2017-08-10 DIAGNOSIS — I10 ESSENTIAL HYPERTENSION: ICD-10-CM

## 2017-08-10 RX ORDER — POTASSIUM CHLORIDE 1500 MG/1
TABLET, EXTENDED RELEASE ORAL
Qty: 90 TAB | Refills: 2 | Status: SHIPPED | OUTPATIENT
Start: 2017-08-10 | End: 2018-06-03 | Stop reason: SDUPTHER

## 2017-09-07 NOTE — TELEPHONE ENCOUNTER
Patient called needs refill on losartan 25 mg this medication said [facility] faxed request two days ago this dose is historical in system  and she does see cardiology patient said dr Solorio Staff has filled it before but it may have been a different dose. I did notify patient she is out of the office so I am unable to ask her and other providers will not write something that hasn't been filled by her she voiced understanding and  will try and contact cardiology to get it refilled because she is out .

## 2017-09-21 ENCOUNTER — OFFICE VISIT (OUTPATIENT)
Dept: FAMILY MEDICINE CLINIC | Age: 62
End: 2017-09-21

## 2017-09-21 VITALS
HEIGHT: 63 IN | RESPIRATION RATE: 16 BRPM | HEART RATE: 74 BPM | WEIGHT: 190 LBS | BODY MASS INDEX: 33.66 KG/M2 | DIASTOLIC BLOOD PRESSURE: 78 MMHG | TEMPERATURE: 98.3 F | SYSTOLIC BLOOD PRESSURE: 120 MMHG | OXYGEN SATURATION: 97 %

## 2017-09-21 DIAGNOSIS — E78.00 HYPERCHOLESTEROLEMIA: Primary | ICD-10-CM

## 2017-09-21 DIAGNOSIS — R06.83 SNORING: ICD-10-CM

## 2017-09-21 DIAGNOSIS — M19.90 ARTHRITIS: ICD-10-CM

## 2017-09-21 DIAGNOSIS — Z23 ENCOUNTER FOR IMMUNIZATION: ICD-10-CM

## 2017-09-21 DIAGNOSIS — I10 ESSENTIAL HYPERTENSION: ICD-10-CM

## 2017-09-21 RX ORDER — LEVALBUTEROL INHALATION SOLUTION 0.63 MG/3ML
0.63 SOLUTION RESPIRATORY (INHALATION)
Qty: 1 PACKAGE | Refills: 3 | Status: SHIPPED | OUTPATIENT
Start: 2017-09-21 | End: 2018-09-18 | Stop reason: ALTCHOICE

## 2017-09-21 RX ORDER — NITROGLYCERIN 0.4 MG/1
TABLET SUBLINGUAL
Qty: 50 TAB | Refills: 0 | Status: SHIPPED | OUTPATIENT
Start: 2017-09-21 | End: 2020-06-29 | Stop reason: SDUPTHER

## 2017-09-21 RX ORDER — FLUTICASONE PROPIONATE 50 MCG
2 SPRAY, SUSPENSION (ML) NASAL DAILY
Qty: 3 BOTTLE | Refills: 3 | Status: SHIPPED | OUTPATIENT
Start: 2017-09-21 | End: 2020-06-15 | Stop reason: SDUPTHER

## 2017-09-21 RX ORDER — LORATADINE 10 MG/1
10 TABLET ORAL
COMMUNITY

## 2017-09-21 NOTE — PATIENT INSTRUCTIONS

## 2017-09-21 NOTE — PROGRESS NOTES
Assessment/Plan:    *Diagnoses and all orders for this visit:    1. Hypercholesterolemia    2. Essential hypertension    3. Encounter for immunization  -     Influenza virus vaccine (QUADRIVALENT PRES FREE SYRINGE) IM ([id number])  -     Administration fee () for Medicare insured patients    4. Arthritis    Other orders  -     fluticasone (FLONASE) 50 mcg/actuation nasal spray; 2 Sprays by Both Nostrils route daily. -     nitroglycerin (NITROSTAT) 0.4 mg SL tablet; DISSOLVE ONE TABLET UNDER THE TONGUE EVERY 5 MINUTES AS NEEDED FOR CHEST PAIN. DO NOT EXCEED A TOTAL OF 3 DOSES IN 15 MINUTES  -     levalbuterol (XOPENEX) 0.63 mg/3 mL nebu; 3 mL by Nebulization route four (4) times daily as needed. Dx 531.1,189.39, 135      Physical in Feb 2018. The plan was discussed with the patient. The patient verbalized understanding and is in agreement with the plan. All medication potential side effects were discussed with the patient.    -------------------------------------------------------------------------------------------------------------------        Braeden Burton is a 65 y.o. female and presents with Hypertension; Cholesterol Problem; and Nasal Congestion         Subjective:  Pt here for f/u. HTN: well controlled. HLD: stable. Arthritis: joint pain stable. ROS:  Constitutional: No recent weight change. No weakness/fatigue. No f/c. Skin: No rashes, change in nails/hair, itching   HENT: No HA, dizziness. No hearing loss/tinnitus. No nasal congestion/discharge. Eyes: No change in vision, double/blurred vision or eye pain/redness. Cardiovascular: No CP/palpitations. No AMADO/orthopnea/PND. Respiratory: No cough/sputum, dyspnea, wheezing. Gastointestinal: No dysphagia, reflux. No n/v. No constipation/diarrhea. No melena/rectal bleeding. Genitourinary: No dysuria, urinary hesitancy, nocturia, hematuria. No incontinence. Musculoskeletal: No joint pain/stiffness.   No muscle pain/tenderness. Endo: No heat/cold intolerance, no polyuria/polydypsia. Heme: No h/o anemia. No easy bleeding/bruising. Allergy/Immunology: No seasonal rhinitis. Denies frequent colds, sinus/ear infections. Neurological: No seizures/numbness/weakness. No paresthesias. Psychiatric:  No depression, anxiety. The problem list was updated as a part of today's visit. Patient Active Problem List   Diagnosis Code    Arthritis M19.90    CAD (coronary artery disease) I25.10    GERD (gastroesophageal reflux disease) K21.9    Sarcoidosis ([address] D86.9    Ganglion cyst of wrist M67.439    Posterior vitreous detachment H43.819    Hypercholesterolemia E78.00    Urinary incontinence R32    Allergy, unspecified not elsewhere classified T78.40XA    CHF (congestive heart failure) ([facility]) I50.9    HTN (hypertension) I10    Atlanto-axial subluxation S13.120A    RAD (reactive airway disease) J45.909    Advance care planning Z71.89       The PSH, FH were reviewed. SH:  Social History   Substance Use Topics    Smoking status: Former Smoker     Quit date: 8/1/1986    Smokeless tobacco: Former User    Alcohol use No       Medications/Allergies:  Current Outpatient Prescriptions on File Prior to Visit   Medication Sig Dispense Refill    KLOR-CON M20 20 mEq tablet TAKE ONE TABLET BY MOUTH ONCE DAILY 90 Tab 2    TOVIAZ 8 mg ER tablet TAKE ONE TABLET BY MOUTH ONCE DAILY 90 Tab 2    meloxicam (MOBIC) 15 mg tablet TAKE ONE TABLET BY MOUTH ONCE DAILY 90 Tab 1    carvedilol (COREG) 25 mg tablet TAKE ONE TABLET BY MOUTH IN THE MORNING AND ONE-HALF TAB IN THE EVENING WITH  MEALS 135 Tab 2    Difluprednate (DUREZOL) 0.05 % ophthalmic emulsion Administer 1 Drop to both eyes.  raNITIdine (ZANTAC) 150 mg tablet TAKE ONE TABLET BY MOUTH IN THE EVENING 90 Tab 2    furosemide (LASIX) 20 mg tablet TAKE ONE TABLET BY MOUTH ONCE DAILY 90 Tab 2    pantoprazole (PROTONIX) 40 mg tablet Take 1 Tab by mouth daily.  80 Tab 2    VOLTAREN 1 % gel APPLY 4G TO AFFECTED AREA FOUR TIMES DAILY 400 g 2    fluocinoNIDE (LIDEX) 0.05 % topical cream APPLY  CREAM TOPICALLY TO AFFECTED AREA TWICE DAILY 45 g 1    ammonium lactate (AMLACTIN) 12 % topical cream APPLY CREAM TOPICALLY TO AFFECTED AREA TWICE DAILY. (RUB IN AFFECTED AREA WELL) 385 g 2    CRESTOR 10 mg tablet TAKE ONE TABLET BY MOUTH AT BEDTIME (Patient taking differently: half a tablet at night) 90 Tab 2    levalbuterol tartrate (XOPENEX) 45 mcg/actuation inhaler Take 2 Puffs by inhalation every six (6) hours as needed for Wheezing. 1 Inhaler 2    BIOTIN PO Take  by mouth daily.  losartan (COZAAR) 50 mg tablet Take 25 mg by mouth daily.  ipratropium (ATROVENT) 0.02 % nebulizer solution 2.5 mL by Nebulization route as needed for Wheezing. 2.5 mL 0    cholecalciferol, vitamin d3, (VITAMIN D) 1,000 unit tablet Take 2,000 Units by mouth daily.  aspirin delayed-release 81 mg tablet Take 81 mg by mouth daily.  multivitamin (ONE A DAY) tablet Take 1 Tab by mouth daily.  DOCOSAHEXANOIC ACID/EPA (FISH OIL PO) Take  by mouth daily. 4 tabs daily.  cyclosporin (RESTASIS) 0.05 % ophthalmic emulsion Administer 1 Drop to both eyes two (2) times a day.  bromfenac (PROLENSA) 0.07 % ophthalmic solution Administer 1 Drop to both eyes daily.  cyclobenzaprine (FLEXERIL) 5 mg tablet Take 1 Tab by mouth three (3) times daily as needed for Muscle Spasm(s). 40 Tab 1    fexofenadine (ALLEGRA) 180 mg tablet Take  by mouth daily. No current facility-administered medications on file prior to visit.          Allergies   Allergen Reactions    Asa-Acetaminophen-Caff-Potass Other (comments)     GI upset    Bextra [Valdecoxib] Other (comments)     GI upset    Celebrex [Celecoxib] Other (comments)     GI upset    Naproxen Rash    Pcn [Penicillins] Swelling    Sulfur Rash         Health Maintenance:   Health Maintenance   Topic Date Due    INFLUENZA AGE 9 TO ADULT  08/01/2017    BREAST CANCER SCRN MAMMOGRAM  03/07/2018    DTaP/Tdap/Td series (2 - Td) 02/24/2025    COLONOSCOPY  05/31/2026    Hepatitis C Screening  Completed    ZOSTER VACCINE AGE 60>  Addressed    Pneumococcal 19-64 Medium Risk  Completed       Objective:  Visit Vitals    /78 (BP 1 Location: Left arm, BP Patient Position: Sitting)    Pulse 74    Temp 98.3 °F (36.8 °C) (Oral)    Resp 16    Ht 5' 3\" (1.6 m)    Wt 190 lb (86.2 kg)    SpO2 97%    BMI 33.66 kg/m2          Nurses notes and VS reviewed. Physical Examination: General appearance - alert, well appearing, and in no distress  Chest - clear to auscultation, no wheezes, rales or rhonchi, symmetric air entry  Heart - normal rate, regular rhythm, normal S1, S2, no murmurs, rubs, clicks or gallops  Extremities - pedal edema 1-2 +        Labwork and Ancillary Studies:    CBC w/Diff  Lab Results   Component Value Date/Time    WBC 3.2 03/09/2017 07:45 AM    HGB 12.3 03/09/2017 07:45 AM    PLATELET 824 01/44/4425 07:45 AM         Basic Metabolic Profile  Lab Results   Component Value Date/Time    Sodium 138 03/09/2017 07:45 AM    Potassium 4.7 03/09/2017 07:45 AM    Chloride 102 03/09/2017 07:45 AM    CO2 30 03/09/2017 07:45 AM    Anion gap 6 03/09/2017 07:45 AM    Glucose 90 03/09/2017 07:45 AM    BUN 19 03/09/2017 07:45 AM    Creatinine 1.02 03/09/2017 07:45 AM    BUN/Creatinine ratio 19 03/09/2017 07:45 AM    GFR est AA >60 03/09/2017 07:45 AM    GFR est non-AA 55 03/09/2017 07:45 AM    Calcium 8.5 03/09/2017 07:45 AM         LFT  Lab Results   Component Value Date/Time    ALT (SGPT) 35 03/09/2017 07:45 AM    AST (SGOT) 28 03/09/2017 07:45 AM    Alk.  phosphatase 78 03/09/2017 07:45 AM    Bilirubin, direct 0.3 03/09/2017 07:45 AM    Bilirubin, total 0.8 03/09/2017 07:45 AM         Cholesterol  Lab Results   Component Value Date/Time    Cholesterol, total 120 03/09/2017 07:45 AM    HDL Cholesterol 61 03/09/2017 07:45 AM    LDL, calculated 52.2 03/09/2017 07:45 AM    Triglyceride 34 03/09/2017 07:45 AM    CHOL/HDL Ratio 2.0 03/09/2017 07:45 AM

## 2017-09-21 NOTE — MR AVS SNAPSHOT
Visit Information Date & Time Provider Department Dept. Phone Encounter #  
 9/21/2017  7:30 AM Marcellus Mam, 3 [facility] [phone] [id number] Upcoming Health Maintenance Date Due INFLUENZA AGE 9 TO ADULT 8/1/2017 BREAST CANCER SCRN MAMMOGRAM 3/7/2018 DTaP/Tdap/Td series (2 - Td) 2/24/2025 COLONOSCOPY 5/31/2026 Allergies as of 9/21/2017  Review Complete On: 9/21/2017 By: Karlene Hannon MD  
  
 Severity Noted Reaction Type Reactions Asa-acetaminophen-caff-potass  04/08/2010    Other (comments) GI upset Bextra [Valdecoxib]  04/08/2010    Other (comments) GI upset Celebrex [Celecoxib]  04/08/2010    Other (comments) GI upset Naproxen  11/11/2014    Rash Pcn [Penicillins]  04/08/2010    Swelling Sulfur  04/08/2010    Rash Current Immunizations  Reviewed on 2/21/2017 Name Date Influenza Vaccine 10/9/2015, 10/15/2014, 10/30/2013 Influenza Vaccine (Quad) PF  Incomplete, 10/25/2016  3:39 PM  
 Influenza Vaccine Split 10/11/2012 Influenza Vaccine Whole 11/24/2010 Pneumococcal Polysaccharide (PPSV-23) 11/16/2016 Tdap 2/24/2015 Not reviewed this visit You Were Diagnosed With   
  
 Codes Comments Hypercholesterolemia    -  Primary ICD-10-CM: E78.00 ICD-9-CM: 272.0 Essential hypertension     ICD-10-CM: I10 
ICD-9-CM: 401.9 Encounter for immunization     ICD-10-CM: K69 ICD-9-CM: V03.89 Vitals BP Pulse Temp Resp Height(growth percentile) Weight(growth percentile) 120/78 (BP 1 Location: Left arm, BP Patient Position: Sitting) 74 98.3 °F (36.8 °C) (Oral) 16 5' 3\" (1.6 m) 190 lb (86.2 kg) SpO2 BMI OB Status Smoking Status 97% 33.66 kg/m2 Hysterectomy Former Smoker Vitals History BMI and BSA Data Body Mass Index Body Surface Area  
 33.66 kg/m 2 1.96 m 2 Preferred Pharmacy Pharmacy Name Phone [facility] [address] [facility],  [address] Your Updated Medication List  
  
   
This list is accurate as of: 9/21/17  7:58 AM.  Always use your most recent med list. ALLEGRA 180 mg tablet Generic drug:  fexofenadine Take  by mouth daily. ammonium lactate 12 % topical cream  
Commonly known as:  AMLACTIN  
APPLY CREAM TOPICALLY TO AFFECTED AREA TWICE DAILY. (RUB IN AFFECTED AREA WELL) aspirin delayed-release 81 mg tablet Take 81 mg by mouth daily. BIOTIN PO Take  by mouth daily. carvedilol 25 mg tablet Commonly known as:  COREG  
TAKE ONE TABLET BY MOUTH IN THE MORNING AND ONE-HALF TAB IN THE EVENING WITH  MEALS  
  
 CLARITIN 10 mg tablet Generic drug:  loratadine Take 10 mg by mouth. CRESTOR 10 mg tablet Generic drug:  rosuvastatin TAKE ONE TABLET BY MOUTH AT BEDTIME  
  
 cyclobenzaprine 5 mg tablet Commonly known as:  FLEXERIL Take 1 Tab by mouth three (3) times daily as needed for Muscle Spasm(s). DUREZOL 0.05 % ophthalmic emulsion Generic drug:  Difluprednate Administer 1 Drop to both eyes. FISH OIL PO Take  by mouth daily. 4 tabs daily. fluocinoNIDE 0.05 % topical cream  
Commonly known as:  LIDEX APPLY  CREAM TOPICALLY TO AFFECTED AREA TWICE DAILY  
  
 fluticasone 50 mcg/actuation nasal spray Commonly known as:  Eloina Perales 2 Sprays by Both Nostrils route daily. furosemide 20 mg tablet Commonly known as:  LASIX TAKE ONE TABLET BY MOUTH ONCE DAILY  
  
 ipratropium 0.02 % Soln Commonly known as:  ATROVENT  
2.5 mL by Nebulization route as needed for Wheezing. KLOR-CON M20 20 mEq tablet Generic drug:  potassium chloride TAKE ONE TABLET BY MOUTH ONCE DAILY  
  
 levalbuterol 0.63 mg/3 mL Nebu Commonly known as:  XOPENEX  
3 mL by Nebulization route four (4) times daily as needed.  Dx 493.9,786.05, 135  
  
 levalbuterol tartrate 45 mcg/actuation inhaler Commonly known as:  Korina Siddharth Take 2 Puffs by inhalation every six (6) hours as needed for Wheezing. losartan 50 mg tablet Commonly known as:  COZAAR Take 25 mg by mouth daily. meloxicam 15 mg tablet Commonly known as:  MOBIC  
TAKE ONE TABLET BY MOUTH ONCE DAILY  
  
 multivitamin tablet Commonly known as:  ONE A DAY Take 1 Tab by mouth daily. nitroglycerin 0.4 mg SL tablet Commonly known as:  NITROSTAT  
DISSOLVE ONE TABLET UNDER THE TONGUE EVERY 5 MINUTES AS NEEDED FOR CHEST PAIN. DO NOT EXCEED A TOTAL OF 3 DOSES IN 15 MINUTES  
  
 pantoprazole 40 mg tablet Commonly known as:  PROTONIX Take 1 Tab by mouth daily. PROLENSA 0.07 % ophthalmic solution Generic drug:  bromfenac Administer 1 Drop to both eyes daily. raNITIdine 150 mg tablet Commonly known as:  ZANTAC TAKE ONE TABLET BY MOUTH IN THE EVENING  
  
 RESTASIS 0.05 % ophthalmic emulsion Generic drug:  cycloSPORINE Administer 1 Drop to both eyes two (2) times a day. TOVIAZ 8 mg ER tablet Generic drug:  fesoterodine TAKE ONE TABLET BY MOUTH ONCE DAILY  
  
 VITAMIN D3 1,000 unit tablet Generic drug:  cholecalciferol Take 2,000 Units by mouth daily. VOLTAREN 1 % Gel Generic drug:  diclofenac APPLY 4G TO AFFECTED AREA FOUR TIMES DAILY Prescriptions Sent to Pharmacy Refills  
 fluticasone (FLONASE) 50 mcg/actuation nasal spray 3 Si Sprays by Both Nostrils route daily. Class: Normal  
 Pharmacy: [place] [address] [facility] [address] Ph #: [phone] Route: Both Nostrils  
 nitroglycerin (NITROSTAT) 0.4 mg SL tablet 0 Sig: DISSOLVE ONE TABLET UNDER THE TONGUE EVERY 5 MINUTES AS NEEDED FOR CHEST PAIN. DO NOT EXCEED A TOTAL OF 3 DOSES IN 15 MINUTES Class: Normal  
 Pharmacy: [place] [address] [facility] [address] Ph #: [phone] levalbuterol (XOPENEX) 0.63 mg/3 mL nebu 3 Sig: 3 mL by Nebulization route four (4) times daily as needed. Dx 493.9,786.05, 135 Class: Normal  
 Pharmacy: [address], [address] Ph #: [phone] Route: Nebulization We Performed the Following ADMIN INFLUENZA VIRUS VAC [ [facility]] INFLUENZA VIRUS VAC QUAD,SPLIT,PRESV FREE SYRINGE IM [id number] CPT(R)] Patient Instructions Influenza (Flu) Vaccine (Inactivated or Recombinant): What You Need to Know Why get vaccinated? Influenza (\"flu\") is a contagious disease that spreads around the [place] every winter, usually between October and May. Flu is caused by influenza viruses and is spread mainly by coughing, sneezing, and close contact. Anyone can get flu. Flu strikes suddenly and can last several days. Symptoms vary by age, but can include: · Fever/chills. · Sore throat. · Muscle aches. · Fatigue. · Cough. · Headache. · Runny or stuffy nose. Flu can also lead to pneumonia and blood infections, and cause diarrhea and seizures in children. If you have a medical condition, such as heart or lung disease, flu can make it worse. Flu is more dangerous for some people. Infants and young children, people 72years of age and older, pregnant women, and people with certain health conditions or a weakened immune system are at greatest risk. Each year thousands of people in the [facility] die from flu, and many more are hospitalized. Flu vaccine can: · Keep you from getting flu. · Make flu less severe if you do get it. · Keep you from spreading flu to your family and other people. Inactivated and recombinant flu vaccines A dose of flu vaccine is recommended every flu season. Children 6 months through 6years of age may need two doses during the same flu season. Everyone else needs only one dose each flu season.  
Some inactivated flu vaccines contain a very small amount of a mercury-based preservative called thimerosal. Studies have not shown thimerosal in vaccines to be harmful, but flu vaccines that do not contain thimerosal are available. There is no live flu virus in flu shots. They cannot cause the flu. There are many flu viruses, and they are always changing. Each year a new flu vaccine is made to protect against three or four viruses that are likely to cause disease in the upcoming flu season. But even when the vaccine doesn't exactly match these viruses, it may still provide some protection. Flu vaccine cannot prevent: · Flu that is caused by a virus not covered by the vaccine. · Illnesses that look like flu but are not. Some people should not get this vaccine Tell the person who is giving you the vaccine: · If you have any severe (life-threatening) allergies. If you ever had a life-threatening allergic reaction after a dose of flu vaccine, or have a severe allergy to any part of this vaccine, you may be advised not to get vaccinated. Most, but not all, types of flu vaccine contain a small amount of egg protein. · If you ever had Guillain-Barré syndrome (also called GBS) Some people with a history of GBS should not get this vaccine. This should be discussed with your doctor. · If you are not feeling well. It is usually okay to get flu vaccine when you have a mild illness, but you might be asked to come back when you feel better. Risks of a vaccine reaction With any medicine, including vaccines, there is a chance of reactions. These are usually mild and go away on their own, but serious reactions are also possible. Most people who get a flu shot do not have any problems with it. Minor problems following a flu shot include: · Soreness, redness, or swelling where the shot was given · Hoarseness · Sore, red or itchy eyes · Cough · Fever · Aches · Headache · Itching · Fatigue If these problems occur, they usually begin soon after the shot and last 1 or 2 days. More serious problems following a flu shot can include the following: · There may be a small increased risk of Guillain-Barré Syndrome (GBS) after inactivated flu vaccine. This risk has been estimated at 1 or 2 additional cases per million people vaccinated. This is much lower than the risk of severe complications from flu, which can be prevented by flu vaccine. · Doreen brasher who get the flu shot along with pneumococcal vaccine (PCV13) and/or DTaP vaccine at the same time might be slightly more likely to have a seizure caused by fever. Ask your doctor for more information. Tell your doctor if a child who is getting flu vaccine has ever had a seizure Problems that could happen after any injected vaccine: · People sometimes faint after a medical procedure, including vaccination. Sitting or lying down for about 15 minutes can help prevent fainting, and injuries caused by a fall. Tell your doctor if you feel dizzy, or have vision changes or ringing in the ears. · Some people get severe pain in the shoulder and have difficulty moving the arm where a shot was given. This happens very rarely. · Any medication can cause a severe allergic reaction. Such reactions from a vaccine are very rare, estimated at about 1 in a million doses, and would happen within a few minutes to a few hours after the vaccination. As with any medicine, there is a very remote chance of a vaccine causing a serious injury or death. The safety of vaccines is always being monitored. For more information, visit: www.cdc.gov/vaccinesafety/. What if there is a serious reaction? What should I look for? · Look for anything that concerns you, such as signs of a severe allergic reaction, very high fever, or unusual behavior.  
Signs of a severe allergic reaction can include hives, swelling of the face and throat, difficulty breathing, a fast heartbeat, dizziness, and weakness  usually within a few minutes to a few hours after the vaccination. What should I do? · If you think it is a severe allergic reaction or other emergency that can't wait, call 9-1-1 and get the person to the nearest hospital. Otherwise, call your doctor. · Reactions should be reported to the \"Vaccine Adverse Event Reporting System\" (VAERS). Your doctor should file this report, or you can do it yourself through the VAERS website at www.vaers. [facility].gov, or by calling 0-324.398.6965. VAERS does not give medical advice. The National Vaccine Injury Compensation Program 
The National Vaccine Injury Compensation Program (VICP) is a federal program that was created to compensate people who may have been injured by certain vaccines. Persons who believe they may have been injured by a vaccine can learn about the program and about filing a claim by calling 0-369.293.5341 or visiting the [employer] website at www.[employer].gov/vaccinecompensation. There is a time limit to file a claim for compensation. How can I learn more? · Ask your healthcare provider. He or she can give you the vaccine package insert or suggest other sources of information. · Call your local or state health department. · Contact the Centers for Disease Control and Prevention (CDC): 
¨ Call 4-130.679.3854 (1-800-CDC-INFO) or ¨ Visit CDC's website at www.cdc.gov/flu Vaccine Information Statement Inactivated Influenza Vaccine 8/7/2015) 42 LUDWIG Garcia 331AQ-81 Department of [facility] and [employer] Centers for Disease Control and Prevention Many Vaccine Information Statements are available in [place] and other languages. See www.immunize.org/vis. Muchas hojas de información sobre vacunas están disponibles en español y en otros idiomas. Visite www.immunize.org/vis. Care instructions adapted under license by [employer] (which disclaims liability or warranty for this information).  If you have questions about a medical condition or this instruction, always ask your healthcare professional. [address] any warranty or liability for your use of this information. Introducing [facility]! Dear Pretty Fonseca: Thank you for requesting a [employer] account. Our records indicate that you already have an active [employer] account. You can access your account anytime at [email or web address] Did you know that you can access your hospital and ER discharge instructions at any time in [employer]? You can also review all of your test results from your hospital stay or ER visit. Additional Information If you have questions, please visit the Frequently Asked Questions section of the [employer] website at [email or web address] Remember, [employer] is NOT to be used for urgent needs. For medical emergencies, dial 911. Now available from your iPhone and Android! Please provide this summary of care documentation to your next provider. Your primary care clinician is listed as Isreal 51. If you have any questions after today's visit, please call [phone].

## 2017-09-21 NOTE — PROGRESS NOTES
Todd Fontaine is a 65 y.o. female (: 1955) presenting to address: hypertension and nasal congestion     Chief Complaint   Patient presents with    Hypertension    Cholesterol Problem    Nasal Congestion       Vitals:    17 0737   BP: 120/78   Pulse: 74   Resp: 16   Temp: 98.3 °F (36.8 °C)   TempSrc: Oral   SpO2: 97%   Weight: 190 lb (86.2 kg)   Height: 5' 3\" (1.6 m)   PainSc:   6   PainLoc: Ankle       Hearing/Vision:   No exam data present    Learning Assessment:     Learning Assessment 2015   PRIMARY LEARNER Patient   HIGHEST LEVEL OF EDUCATION - PRIMARY LEARNER  2 YEARS OF COLLEGE   BARRIERS PRIMARY LEARNER NONE   CO-LEARNER CAREGIVER No   PRIMARY LANGUAGE ENGLISH   LEARNER PREFERENCE PRIMARY READING   ANSWERED BY self   RELATIONSHIP SELF     Depression Screening:     PHQ over the last two weeks 2017   Little interest or pleasure in doing things Not at all   Feeling down, depressed or hopeless Not at all   Total Score PHQ 2 0     Fall Risk Assessment:     Fall Risk Assessment, last 12 mths 2017   Able to walk? Yes   Fall in past 12 months? Yes   Fall with injury? No   Number of falls in past 12 months 1   Fall Risk Score 1     Abuse Screening:     Abuse Screening Questionnaire 2017   Do you ever feel afraid of your partner? N   Are you in a relationship with someone who physically or mentally threatens you? N   Is it safe for you to go home? Y     Coordination of Care Questionaire:   1. Have you been to the ER, urgent care clinic since your last visit? Hospitalized since your last visit? NO    2. Have you seen or consulted any other health care providers outside of the [address] [place] [address] since your last visit? Include any pap smears or colon screening. NO    Advanced Directive:   1. Do you have an Advanced Directive? NO    2. Would you like information on Advanced Directives?  NO      Flu shot Immunization/s administered 2017 by Chelsey Holliday LPN with guardian's consent. Patient tolerated procedure well. No reactions noted.

## 2017-09-22 ENCOUNTER — TELEPHONE (OUTPATIENT)
Dept: FAMILY MEDICINE CLINIC | Age: 62
End: 2017-09-22

## 2017-09-28 ENCOUNTER — TELEPHONE (OUTPATIENT)
Dept: FAMILY MEDICINE CLINIC | Age: 62
End: 2017-09-28

## 2017-09-28 DIAGNOSIS — R06.2 WHEEZING: ICD-10-CM

## 2017-09-28 DIAGNOSIS — J45.21 RAD (REACTIVE AIRWAY DISEASE), MILD INTERMITTENT, WITH ACUTE EXACERBATION: ICD-10-CM

## 2017-09-28 RX ORDER — LEVALBUTEROL TARTRATE 45 UG/1
2 AEROSOL, METERED ORAL
Qty: 1 INHALER | Refills: 2 | Status: SHIPPED | OUTPATIENT
Start: 2017-09-28 | End: 2018-09-18 | Stop reason: SDUPTHER

## 2017-10-17 ENCOUNTER — HOSPITAL ENCOUNTER (OUTPATIENT)
Dept: LAB | Age: 62
Discharge: HOME | End: 2017-10-17
Payer: MEDICARE

## 2017-10-17 DIAGNOSIS — E78.00 HYPERCHOLESTEROLEMIA: ICD-10-CM

## 2017-10-17 LAB
ALBUMIN SERPL-MCNC: 3.3 G/DL (ref 3.4–5)
ALBUMIN/GLOB SERPL: 0.8 {RATIO} (ref 0.8–1.7)
ALP SERPL-CCNC: 84 U/L (ref 45–117)
ALT SERPL-CCNC: 36 U/L (ref 13–56)
ANION GAP SERPL CALC-SCNC: 7 MMOL/L (ref 3–18)
AST SERPL-CCNC: 28 U/L (ref 15–37)
BILIRUB DIRECT SERPL-MCNC: 0.2 MG/DL (ref 0–0.2)
BILIRUB SERPL-MCNC: 0.7 MG/DL (ref 0.2–1)
BUN SERPL-MCNC: 25 MG/DL (ref 7–18)
BUN/CREAT SERPL: 24 (ref 12–20)
CALCIUM SERPL-MCNC: 8.3 MG/DL (ref 8.5–10.1)
CHLORIDE SERPL-SCNC: 102 MMOL/L (ref 100–108)
CHOLEST SERPL-MCNC: 114 MG/DL
CO2 SERPL-SCNC: 28 MMOL/L (ref 21–32)
CREAT SERPL-MCNC: 1.05 MG/DL (ref 0.6–1.3)
GLOBULIN SER CALC-MCNC: 4.3 G/DL (ref 2–4)
GLUCOSE SERPL-MCNC: 89 MG/DL (ref 74–99)
HDLC SERPL-MCNC: 53 MG/DL (ref 40–60)
HDLC SERPL: 2.2 {RATIO} (ref 0–5)
LDLC SERPL CALC-MCNC: 52.6 MG/DL (ref 0–100)
LIPID PROFILE,FLP: NORMAL
POTASSIUM SERPL-SCNC: 4.4 MMOL/L (ref 3.5–5.5)
PROT SERPL-MCNC: 7.6 G/DL (ref 6.4–8.2)
SODIUM SERPL-SCNC: 137 MMOL/L (ref 136–145)
TRIGL SERPL-MCNC: 42 MG/DL (ref ?–150)
VLDLC SERPL CALC-MCNC: 8.4 MG/DL

## 2017-10-17 PROCEDURE — 80048 BASIC METABOLIC PNL TOTAL CA: CPT | Performed by: INTERNAL MEDICINE

## 2017-10-17 PROCEDURE — 80061 LIPID PANEL: CPT | Performed by: INTERNAL MEDICINE

## 2017-10-17 PROCEDURE — 80076 HEPATIC FUNCTION PANEL: CPT | Performed by: INTERNAL MEDICINE

## 2017-10-17 PROCEDURE — 36415 COLL VENOUS BLD VENIPUNCTURE: CPT | Performed by: INTERNAL MEDICINE

## 2017-10-19 RX ORDER — PANTOPRAZOLE SODIUM 40 MG
40 TABLET, DELAYED RELEASE (ENTERIC COATED) ORAL DAILY
Qty: 90 TAB | Refills: 2 | Status: SHIPPED | OUTPATIENT
Start: 2017-10-19 | End: 2018-07-12 | Stop reason: SDUPTHER

## 2017-10-19 NOTE — TELEPHONE ENCOUNTER
Pt called to request a refill because it will not last  the weekend. Pt states that it can not be the generic it has to be  Protonix.  Please advise

## 2017-11-03 ENCOUNTER — DOCUMENTATION ONLY (OUTPATIENT)
Dept: FAMILY MEDICINE CLINIC | Age: 62
End: 2017-11-03

## 2017-11-03 NOTE — PROGRESS NOTES
Patient called wanting to know what a Bun is I told her its a test to check her kidney function and what dose of calcium she should take advised for her to take 600 mg once daily patient picked up a 1200 mg tablet and has taken two she will not take any over the weekend and told to take one every other day she voiced understanding

## 2018-01-02 RX ORDER — MELOXICAM 15 MG/1
TABLET ORAL
Qty: 90 TAB | Refills: 1 | Status: SHIPPED | OUTPATIENT
Start: 2018-01-02 | End: 2018-07-03 | Stop reason: SDUPTHER

## 2018-01-17 RX ORDER — RANITIDINE 150 MG/1
TABLET, FILM COATED ORAL
Qty: 90 TAB | Refills: 2 | Status: SHIPPED | OUTPATIENT
Start: 2018-01-17 | End: 2018-10-16 | Stop reason: SDUPTHER

## 2018-02-14 ENCOUNTER — OFFICE VISIT (OUTPATIENT)
Dept: FAMILY MEDICINE CLINIC | Age: 63
End: 2018-02-14

## 2018-02-14 VITALS
SYSTOLIC BLOOD PRESSURE: 110 MMHG | OXYGEN SATURATION: 98 % | HEART RATE: 74 BPM | DIASTOLIC BLOOD PRESSURE: 70 MMHG | TEMPERATURE: 98.3 F | BODY MASS INDEX: 34.2 KG/M2 | WEIGHT: 193 LBS | RESPIRATION RATE: 16 BRPM | HEIGHT: 63 IN

## 2018-02-14 DIAGNOSIS — Z12.31 ENCOUNTER FOR SCREENING MAMMOGRAM FOR MALIGNANT NEOPLASM OF BREAST: ICD-10-CM

## 2018-02-14 DIAGNOSIS — Z00.00 MEDICARE ANNUAL WELLNESS VISIT, SUBSEQUENT: ICD-10-CM

## 2018-02-14 DIAGNOSIS — E55.9 HYPOVITAMINOSIS D: ICD-10-CM

## 2018-02-14 DIAGNOSIS — I50.22 CHRONIC SYSTOLIC CONGESTIVE HEART FAILURE (HCC): ICD-10-CM

## 2018-02-14 DIAGNOSIS — Z13.39 SCREENING FOR ALCOHOLISM: ICD-10-CM

## 2018-02-14 DIAGNOSIS — E78.00 HYPERCHOLESTEROLEMIA: ICD-10-CM

## 2018-02-14 DIAGNOSIS — I10 ESSENTIAL HYPERTENSION: Primary | ICD-10-CM

## 2018-02-14 DIAGNOSIS — Z13.1 SCREENING FOR DIABETES MELLITUS: ICD-10-CM

## 2018-02-14 DIAGNOSIS — Z13.6 SCREENING FOR ISCHEMIC HEART DISEASE: ICD-10-CM

## 2018-02-14 RX ORDER — CARVEDILOL 25 MG/1
TABLET ORAL
Qty: 135 TAB | Refills: 2 | Status: SHIPPED | OUTPATIENT
Start: 2018-02-14 | End: 2018-11-09 | Stop reason: SDUPTHER

## 2018-02-14 RX ORDER — MOXIFLOXACIN 5 MG/ML
1 SOLUTION/ DROPS OPHTHALMIC 3 TIMES DAILY
COMMUNITY
End: 2018-09-18 | Stop reason: ALTCHOICE

## 2018-02-14 RX ORDER — PREDNISONE 5 MG/1
5 TABLET ORAL DAILY
COMMUNITY
End: 2018-09-18 | Stop reason: ALTCHOICE

## 2018-02-14 RX ORDER — ROSUVASTATIN CALCIUM 10 MG/1
TABLET, FILM COATED ORAL
Qty: 45 TAB | Refills: 2 | Status: SHIPPED | OUTPATIENT
Start: 2018-02-14 | End: 2018-10-29 | Stop reason: SDUPTHER

## 2018-02-14 NOTE — PROGRESS NOTES
Ja Mason is a 65 y.o. female (: 1955) presenting to address:    Chief Complaint   Patient presents with    Annual Wellness Visit    Nasal Discharge     for a while        Vitals:    18 0739   BP: 110/70   Pulse: 74   Resp: 16   Temp: 98.3 °F (36.8 °C)   TempSrc: Oral   SpO2: 98%   Weight: 193 lb (87.5 kg)   Height: 5' 3\" (1.6 m)   PainSc:   5   PainLoc: Ankle       Hearing/Vision:      Visual Acuity Screening    Right eye Left eye Both eyes   Without correction: 20/200 20/200 20/200   With correction:      Comments: Patient is having eye surgery       Learning Assessment:     Learning Assessment 2015   PRIMARY LEARNER Patient   HIGHEST LEVEL OF EDUCATION - PRIMARY LEARNER  2 YEARS OF COLLEGE   BARRIERS PRIMARY LEARNER NONE   CO-LEARNER CAREGIVER No   PRIMARY LANGUAGE ENGLISH   LEARNER PREFERENCE PRIMARY READING   ANSWERED BY self   RELATIONSHIP SELF     Depression Screening:     PHQ over the last two weeks 2017   Little interest or pleasure in doing things Not at all   Feeling down, depressed or hopeless Not at all   Total Score PHQ 2 0     Fall Risk Assessment:     Fall Risk Assessment, last 12 mths 2018   Able to walk? Yes   Fall in past 12 months? Yes   Fall with injury? No   Number of falls in past 12 months 2   Fall Risk Score 2     Abuse Screening:     Abuse Screening Questionnaire 2018   Do you ever feel afraid of your partner? N   Are you in a relationship with someone who physically or mentally threatens you? N   Is it safe for you to go home? Y     Coordination of Care Questionaire:   1. Have you been to the ER, urgent care clinic since your last visit? Hospitalized since your last visit? NO    2. Have you seen or consulted any other health care providers outside of the [address] [place] [address] since your last visit? Include any pap smears or colon screening. YES    Advanced Directive:   1. Do you have an Advanced Directive? NO    2.  Would you like information on Advanced Directives?  NO

## 2018-02-14 NOTE — PATIENT INSTRUCTIONS

## 2018-02-14 NOTE — PROGRESS NOTES
This is a Subsequent Medicare Annual Wellness Exam (AWV) (Performed 12 months after IPPE or effective date of Medicare Part B enrollment)    I have reviewed the patient's medical history in detail and updated the computerized patient record. History     Past Medical History:   Diagnosis Date    Advance care planning 2/17/2016    Discussed with pt today and handout given to pt to complete.  Allergy, unspecified not elsewhere classified 6/26/2010    Arthritis     Atlanto-axial subluxation 3/13/2015    CAD (coronary artery disease)     CHF (congestive heart failure) (HCC) 1/2/2014    Ganglion cyst of wrist     GERD (gastroesophageal reflux disease)     HTN (hypertension) 2/24/2015    Hypercholesterolemia     high LDL    Hypertension     Posterior vitreous detachment     RAD (reactive airway disease) 11/9/2015    Sarcoidosis     Urinary incontinence       History reviewed. No pertinent surgical history. Current Outpatient Prescriptions   Medication Sig Dispense Refill    Calcium-Cholecalciferol, D3, (CALCIUM 600 WITH VITAMIN D3) 600 mg(1,500mg) -400 unit chew Take 1,200 mg by mouth every other day.  raNITIdine (ZANTAC) 150 mg tablet TAKE ONE TABLET BY MOUTH ONCE DAILY IN THE EVENING 90 Tab 2    meloxicam (MOBIC) 15 mg tablet TAKE ONE TABLET BY MOUTH ONCE DAILY 90 Tab 1    furosemide (LASIX) 20 mg tablet TAKE ONE TABLET BY MOUTH ONCE DAILY 90 Tab 1    PROTONIX 40 mg tablet Take 1 Tab by mouth daily. 90 Tab 2    levalbuterol tartrate (XOPENEX) 45 mcg/actuation inhaler Take 2 Puffs by inhalation every six (6) hours as needed for Wheezing. 1 Inhaler 2    fluticasone (FLONASE) 50 mcg/actuation nasal spray 2 Sprays by Both Nostrils route daily. 3 Bottle 3    nitroglycerin (NITROSTAT) 0.4 mg SL tablet DISSOLVE ONE TABLET UNDER THE TONGUE EVERY 5 MINUTES AS NEEDED FOR CHEST PAIN.   DO NOT EXCEED A TOTAL OF 3 DOSES IN 15 MINUTES 50 Tab 0    KLOR-CON M20 20 mEq tablet TAKE ONE TABLET BY MOUTH ONCE DAILY 90 Tab 2    TOVIAZ 8 mg ER tablet TAKE ONE TABLET BY MOUTH ONCE DAILY 90 Tab 2    carvedilol (COREG) 25 mg tablet TAKE ONE TABLET BY MOUTH IN THE MORNING AND ONE-HALF TAB IN THE EVENING WITH  MEALS 135 Tab 2    Difluprednate (DUREZOL) 0.05 % ophthalmic emulsion Administer 1 Drop to both eyes.  VOLTAREN 1 % gel APPLY 4G TO AFFECTED AREA FOUR TIMES DAILY 400 g 2    fluocinoNIDE (LIDEX) 0.05 % topical cream APPLY  CREAM TOPICALLY TO AFFECTED AREA TWICE DAILY 45 g 1    ammonium lactate (AMLACTIN) 12 % topical cream APPLY CREAM TOPICALLY TO AFFECTED AREA TWICE DAILY. (RUB IN AFFECTED AREA WELL) 385 g 2    CRESTOR 10 mg tablet TAKE ONE TABLET BY MOUTH AT BEDTIME (Patient taking differently: half a tablet at night) 90 Tab 2    cyclobenzaprine (FLEXERIL) 5 mg tablet Take 1 Tab by mouth three (3) times daily as needed for Muscle Spasm(s). 40 Tab 1    BIOTIN PO Take  by mouth daily.  losartan (COZAAR) 50 mg tablet Take 25 mg by mouth daily.  cholecalciferol, vitamin d3, (VITAMIN D) 1,000 unit tablet Take 2,000 Units by mouth daily.  aspirin delayed-release 81 mg tablet Take 81 mg by mouth daily.  multivitamin (ONE A DAY) tablet Take 1 Tab by mouth daily.  cyclosporin (RESTASIS) 0.05 % ophthalmic emulsion Administer 1 Drop to both eyes two (2) times a day.  loratadine (CLARITIN) 10 mg tablet Take 10 mg by mouth.  levalbuterol (XOPENEX) 0.63 mg/3 mL nebu 3 mL by Nebulization route four (4) times daily as needed. Dx 493.9,786.05, 135 1 Package 3    bromfenac (PROLENSA) 0.07 % ophthalmic solution Administer 1 Drop to both eyes daily.  fexofenadine (ALLEGRA) 180 mg tablet Take  by mouth daily.  ipratropium (ATROVENT) 0.02 % nebulizer solution 2.5 mL by Nebulization route as needed for Wheezing. 2.5 mL 0    DOCOSAHEXANOIC ACID/EPA (FISH OIL PO) Take  by mouth daily. 4 tabs daily.        Allergies   Allergen Reactions    Asa-Acetaminophen-Caff-Potass Other (comments)     GI upset    Bextra [Valdecoxib] Other (comments)     GI upset    Celebrex [Celecoxib] Other (comments)     GI upset    Naproxen Rash    Pcn [Penicillins] Swelling    Sulfur Rash     History reviewed. No pertinent family history. Social History   Substance Use Topics    Smoking status: Former Smoker     Quit date: 8/1/1986    Smokeless tobacco: Former User    Alcohol use No     Patient Active Problem List   Diagnosis Code    Arthritis M19.90    CAD (coronary artery disease) I25.10    GERD (gastroesophageal reflux disease) K21.9    Sarcoidosis D86.9    Ganglion cyst of wrist M67.439    Posterior vitreous detachment H43.819    Hypercholesterolemia E78.00    Urinary incontinence R32    Allergy, unspecified not elsewhere classified T78.40XA    CHF (congestive heart failure) ([facility] [address] I50.9    HTN (hypertension) I10    Atlanto-axial subluxation S13.120A    RAD (reactive airway disease) J45.909    Advance care planning Z71.89       Depression Risk Factor Screening:     PHQ over the last two weeks 9/21/2017   Little interest or pleasure in doing things Not at all   Feeling down, depressed or hopeless Not at all   Total Score PHQ 2 0     Alcohol Risk Factor Screening: You do not drink alcohol or very rarely. Functional Ability and Level of Safety:   Hearing Loss  Hearing is good. Activities of Daily Living  The home contains: no safety equipment. Patient does total self care    Fall Risk  Fall Risk Assessment, last 12 mths 9/21/2017   Able to walk? Yes   Fall in past 12 months? Yes   Fall with injury?  No   Number of falls in past 12 months 1   Fall Risk Score 1       Abuse Screen  Patient is not abused    Cognitive Screening   Evaluation of Cognitive Function:  Has your family/caregiver stated any concerns about your memory: no  Normal    Patient Care Team   Patient Care Team:  Karl Dutta MD as PCP - General  Andrzej Gross MD as Physician (Rheumatology)    Assessment/Plan Education and counseling provided:  Are appropriate based on today's review and evaluation    Diagnoses and all orders for this visit:    1. Medicare annual wellness visit, subsequent    2. Screening for alcoholism  -     Annual  Alcohol Screen 15 min ()    3. Screening for diabetes mellitus    4. Screening for ischemic heart disease    5. Encounter for screening mammogram for malignant neoplasm of breast  -     Bilateral Digital Screening Mammography; Future    6. Chronic systolic congestive heart failure ([facility] [address]    7. Hypercholesterolemia    8.  Essential hypertension        Health Maintenance Due   Topic Date Due    BREAST CANCER SCRN MAMMOGRAM  03/07/2018

## 2018-02-14 NOTE — PROGRESS NOTES
Assessment/Plan:    *Diagnoses and all orders for this visit:    1. Essential hypertension  -     carvedilol (COREG) 25 mg tablet; TAKE ONE TABLET BY MOUTH IN THE MORNING AND ONE-HALF TAB IN THE EVENING WITH  MEALS    2. Medicare annual wellness visit, subsequent    3. Screening for alcoholism  -     Annual  Alcohol Screen 15 min ()    4. Screening for diabetes mellitus    5. Screening for ischemic heart disease    6. Encounter for screening mammogram for malignant neoplasm of breast  -     Bilateral Digital Screening Mammography; Future    7. Chronic systolic congestive heart failure ([facility] [address]    8. Hypercholesterolemia  -     CBC WITH AUTOMATED DIFF; Future  -     HEPATIC FUNCTION PANEL; Future  -     METABOLIC PANEL, BASIC; Future  -     LIPID PANEL; Future  -     TSH 3RD GENERATION; Future  -     T4, FREE; Future  -     URINALYSIS W/ RFLX MICROSCOPIC; Future  -     CRESTOR 10 mg tablet; half a tablet at night  Indications: hyperlipidemia    9. Hypovitaminosis D  -     VITAMIN D, 25 HYDROXY; Future      Will return for labs. The plan was discussed with the patient. The patient verbalized understanding and is in agreement with the plan. All medication potential side effects were discussed with the patient.    -------------------------------------------------------------------------------------------------------------------        Evans Zamarripa is a 65 y.o. female and presents with Annual Wellness Visit and Nasal Discharge (for a while )         Subjective:  Pt here for f/u. HTN:     HLD:    CHF: followed by cardiology. Is stable. ROS:  Constitutional: No recent weight change. No weakness/fatigue. No f/c. Skin: No rashes, change in nails/hair, itching   HENT: No HA, dizziness. No hearing loss/tinnitus. No nasal congestion/discharge. Eyes: No change in vision, double/blurred vision or eye pain/redness. Cardiovascular: No CP/palpitations. No AMADO/orthopnea/PND.    Respiratory: No cough/sputum, dyspnea, wheezing. Gastointestinal: No dysphagia, reflux. No n/v. No constipation/diarrhea. No melena/rectal bleeding. Genitourinary: No dysuria, urinary hesitancy, nocturia, hematuria. No incontinence. Musculoskeletal: No joint pain/stiffness. No muscle pain/tenderness. Endo: No heat/cold intolerance, no polyuria/polydypsia. Heme: No h/o anemia. No easy bleeding/bruising. Allergy/Immunology: No seasonal rhinitis. Denies frequent colds, sinus/ear infections. Neurological: No seizures/numbness/weakness. No paresthesias. Psychiatric:  No depression, anxiety. The problem list was updated as a part of today's visit. Patient Active Problem List   Diagnosis Code    Arthritis M19.90    CAD (coronary artery disease) I25.10    GERD (gastroesophageal reflux disease) K21.9    Sarcoidosis D86.9    Ganglion cyst of wrist M67.439    Posterior vitreous detachment H43.819    Hypercholesterolemia E78.00    Urinary incontinence R32    Allergy, unspecified not elsewhere classified T78.40XA    CHF (congestive heart failure) (HCC) I50.9    HTN (hypertension) I10    Atlanto-axial subluxation S13.120A    RAD (reactive airway disease) J45.909    Advance care planning Z71.89       The PSH, FH were reviewed. SH:  Social History   Substance Use Topics    Smoking status: Former Smoker     Quit date: 8/1/1986    Smokeless tobacco: Former User    Alcohol use No       Medications/Allergies:  Current Outpatient Prescriptions on File Prior to Visit   Medication Sig Dispense Refill    raNITIdine (ZANTAC) 150 mg tablet TAKE ONE TABLET BY MOUTH ONCE DAILY IN THE EVENING 90 Tab 2    meloxicam (MOBIC) 15 mg tablet TAKE ONE TABLET BY MOUTH ONCE DAILY 90 Tab 1    furosemide (LASIX) 20 mg tablet TAKE ONE TABLET BY MOUTH ONCE DAILY 90 Tab 1    PROTONIX 40 mg tablet Take 1 Tab by mouth daily.  90 Tab 2    levalbuterol tartrate (XOPENEX) 45 mcg/actuation inhaler Take 2 Puffs by inhalation every six (6) hours as needed for Wheezing. 1 Inhaler 2    loratadine (CLARITIN) 10 mg tablet Take 10 mg by mouth.  fluticasone (FLONASE) 50 mcg/actuation nasal spray 2 Sprays by Both Nostrils route daily. 3 Bottle 3    nitroglycerin (NITROSTAT) 0.4 mg SL tablet DISSOLVE ONE TABLET UNDER THE TONGUE EVERY 5 MINUTES AS NEEDED FOR CHEST PAIN. DO NOT EXCEED A TOTAL OF 3 DOSES IN 15 MINUTES 50 Tab 0    KLOR-CON M20 20 mEq tablet TAKE ONE TABLET BY MOUTH ONCE DAILY 90 Tab 2    TOVIAZ 8 mg ER tablet TAKE ONE TABLET BY MOUTH ONCE DAILY 90 Tab 2    Difluprednate (DUREZOL) 0.05 % ophthalmic emulsion Administer 1 Drop to both eyes. 4 times daily for left eye      VOLTAREN 1 % gel APPLY 4G TO AFFECTED AREA FOUR TIMES DAILY 400 g 2    fluocinoNIDE (LIDEX) 0.05 % topical cream APPLY  CREAM TOPICALLY TO AFFECTED AREA TWICE DAILY 45 g 1    ammonium lactate (AMLACTIN) 12 % topical cream APPLY CREAM TOPICALLY TO AFFECTED AREA TWICE DAILY. (RUB IN AFFECTED AREA WELL) 385 g 2    cyclobenzaprine (FLEXERIL) 5 mg tablet Take 1 Tab by mouth three (3) times daily as needed for Muscle Spasm(s). 40 Tab 1    BIOTIN PO Take  by mouth daily.  losartan (COZAAR) 50 mg tablet Take 25 mg by mouth daily.  fexofenadine (ALLEGRA) 180 mg tablet Take  by mouth daily.  cholecalciferol, vitamin d3, (VITAMIN D) 1,000 unit tablet Take 2,000 Units by mouth daily.  aspirin delayed-release 81 mg tablet Take 81 mg by mouth daily.  multivitamin (ONE A DAY) tablet Take 1 Tab by mouth daily.  DOCOSAHEXANOIC ACID/EPA (FISH OIL PO) Take  by mouth daily. 4 tabs daily.  cyclosporin (RESTASIS) 0.05 % ophthalmic emulsion Administer 1 Drop to both eyes two (2) times a day.  levalbuterol (XOPENEX) 0.63 mg/3 mL nebu 3 mL by Nebulization route four (4) times daily as needed. Dx 493.9,786.05, 135 1 Package 3    bromfenac (PROLENSA) 0.07 % ophthalmic solution Administer 1 Drop to both eyes daily.       ipratropium (ATROVENT) 0.02 % nebulizer solution 2.5 mL by Nebulization route as needed for Wheezing. 2.5 mL 0     No current facility-administered medications on file prior to visit. Allergies   Allergen Reactions    Asa-Acetaminophen-Caff-Potass Other (comments)     GI upset    Bextra [Valdecoxib] Other (comments)     GI upset    Celebrex [Celecoxib] Other (comments)     GI upset    Naproxen Rash    Pcn [Penicillins] Swelling    Sulfur Rash         Health Maintenance:   Health Maintenance   Topic Date Due    BREAST CANCER SCRN MAMMOGRAM  03/07/2018    DTaP/Tdap/Td series (2 - Td) 02/24/2025    COLONOSCOPY  05/31/2026    Hepatitis C Screening  Completed    ZOSTER VACCINE AGE 60>  Addressed    Pneumococcal 19-64 Medium Risk  Completed    Influenza Age 5 to Adult  Completed       Objective:  Visit Vitals    /70 (BP 1 Location: Left arm, BP Patient Position: Sitting)    Pulse 74    Temp 98.3 °F (36.8 °C) (Oral)    Resp 16    Ht 5' 3\" (1.6 m)    Wt 193 lb (87.5 kg)    SpO2 98%    BMI 34.19 kg/m2          Nurses notes and VS reviewed.       Physical Examination: General appearance - alert, well appearing, and in no distress  Ears - ceruminosis noted RT ear  Mouth - mucous membranes moist, pharynx normal without lesions  Neck - supple, no significant adenopathy  Chest - clear to auscultation, no wheezes, rales or rhonchi, symmetric air entry  Heart - normal rate, regular rhythm, normal S1, S2, no murmurs, rubs, clicks or gallops  Abdomen - soft, nontender, nondistended, no masses or organomegaly  Breasts - breasts appear normal, no suspicious masses, no skin or nipple changes or axillary nodes  Back exam - full range of motion, no tenderness, palpable spasm or pain on motion  Musculoskeletal - no joint tenderness, deformity or swelling  Extremities - peripheral pulses normal, no pedal edema, no clubbing or cyanosis          Labwork and Ancillary Studies:    CBC w/Diff  Lab Results   Component Value Date/Time    WBC 3.2 (L) 03/09/2017 07:45 AM    HGB 12.3 03/09/2017 07:45 AM    PLATELET 559 60/45/4506 07:45 AM         Basic Metabolic Profile  Lab Results   Component Value Date/Time    Sodium 137 10/17/2017 07:33 AM    Potassium 4.4 10/17/2017 07:33 AM    Chloride 102 10/17/2017 07:33 AM    CO2 28 10/17/2017 07:33 AM    Anion gap 7 10/17/2017 07:33 AM    Glucose 89 10/17/2017 07:33 AM    BUN 25 (H) 10/17/2017 07:33 AM    Creatinine 1.05 10/17/2017 07:33 AM    BUN/Creatinine ratio 24 (H) 10/17/2017 07:33 AM    GFR est AA >60 10/17/2017 07:33 AM    GFR est non-AA 53 (L) 10/17/2017 07:33 AM    Calcium 8.3 (L) 10/17/2017 07:33 AM         LFT  Lab Results   Component Value Date/Time    ALT (SGPT) 36 10/17/2017 07:33 AM    AST (SGOT) 28 10/17/2017 07:33 AM    Alk.  phosphatase 84 10/17/2017 07:33 AM    Bilirubin, direct 0.2 10/17/2017 07:33 AM    Bilirubin, total 0.7 10/17/2017 07:33 AM         Cholesterol  Lab Results   Component Value Date/Time    Cholesterol, total 114 10/17/2017 07:33 AM    HDL Cholesterol 53 10/17/2017 07:33 AM    LDL, calculated 52.6 10/17/2017 07:33 AM    Triglyceride 42 10/17/2017 07:33 AM    CHOL/HDL Ratio 2.2 10/17/2017 07:33 AM

## 2018-02-14 NOTE — MR AVS SNAPSHOT
[address] [place] [address]  [address]5 [facility] [id number] [phone] Patient: Darlene Norton MRN: [id number] EFD:0/29/3716 Visit Information Date & Time Provider Department Dept. Phone Encounter #  
 2/14/2018  7:30 AM Pepe Ruiz, Bibiana Mcgarry [phone] [id number] Follow-up Instructions Return in about 4 months (around 6/14/2018), or if symptoms worsen or fail to improve. Upcoming Health Maintenance Date Due  
 BREAST CANCER SCRN MAMMOGRAM 3/7/2018 DTaP/Tdap/Td series (2 - Td) 2/24/2025 COLONOSCOPY 5/31/2026 Allergies as of 2/14/2018  Review Complete On: 2/14/2018 By: Pepe Ruiz MD  
  
 Severity Noted Reaction Type Reactions Asa-acetaminophen-caff-potass  04/08/2010    Other (comments) GI upset Bextra [Valdecoxib]  04/08/2010    Other (comments) GI upset Celebrex [Celecoxib]  04/08/2010    Other (comments) GI upset Naproxen  11/11/2014    Rash Pcn [Penicillins]  04/08/2010    Swelling Sulfur  04/08/2010    Rash Current Immunizations  Reviewed on 2/21/2017 Name Date Influenza Vaccine 10/9/2015, 10/15/2014, 10/30/2013 Influenza Vaccine (Quad) PF 9/21/2017  8:07 AM, 10/25/2016  3:39 PM  
 Influenza Vaccine Split 10/11/2012 Influenza Vaccine Whole 11/24/2010 Pneumococcal Polysaccharide (PPSV-23) 11/16/2016 Tdap 2/24/2015 Zoster Vaccine, Live 9/23/2017 Not reviewed this visit You Were Diagnosed With   
  
 Codes Comments Essential hypertension    -  Primary ICD-10-CM: I10 
ICD-9-CM: 401.9 Medicare annual wellness visit, subsequent     ICD-10-CM: Z00.00 ICD-9-CM: V70.0 Screening for alcoholism     ICD-10-CM: Z13.89 ICD-9-CM: V79.1 Screening for diabetes mellitus     ICD-10-CM: Z13.1 ICD-9-CM: V77.1 Screening for ischemic heart disease     ICD-10-CM: Z13.6 ICD-9-CM: V81.0 Encounter for screening mammogram for malignant neoplasm of breast     ICD-10-CM: Z12.31 
ICD-9-CM: V76.12 Chronic systolic congestive heart failure (HCC)     ICD-10-CM: I50.22 ICD-9-CM: 428.22, 428.0 Hypercholesterolemia     ICD-10-CM: E78.00 ICD-9-CM: 272.0 Hypovitaminosis D     ICD-10-CM: E55.9 ICD-9-CM: 268.9 Vitals BP Pulse Temp Resp Height(growth percentile) Weight(growth percentile) 110/70 (BP 1 Location: Left arm, BP Patient Position: Sitting) 74 98.3 °F (36.8 °C) (Oral) 16 5' 3\" (1.6 m) 193 lb (87.5 kg) SpO2 BMI OB Status Smoking Status 98% 34.19 kg/m2 Hysterectomy Former Smoker Vitals History BMI and BSA Data Body Mass Index Body Surface Area  
 34.19 kg/m 2 1.97 m 2 Preferred Pharmacy Pharmacy Name Phone [address], [address] [place] [address] [address] [phone] Your Updated Medication List  
  
   
This list is accurate as of: 2/14/18  8:08 AM.  Always use your most recent med list. ALLEGRA 180 mg tablet Generic drug:  fexofenadine Take  by mouth daily. ammonium lactate 12 % topical cream  
Commonly known as:  AMLACTIN  
APPLY CREAM TOPICALLY TO AFFECTED AREA TWICE DAILY. (RUB IN AFFECTED AREA WELL) aspirin delayed-release 81 mg tablet Take 81 mg by mouth daily. BIOTIN PO Take  by mouth daily. CALCIUM 600 WITH VITAMIN D3 600 mg(1,500mg) -400 unit Chew Generic drug:  Calcium-Cholecalciferol (D3) Take 1,200 mg by mouth every other day. carvedilol 25 mg tablet Commonly known as:  COREG  
TAKE ONE TABLET BY MOUTH IN THE MORNING AND ONE-HALF TAB IN THE EVENING WITH  MEALS  
  
 CLARITIN 10 mg tablet Generic drug:  loratadine Take 10 mg by mouth. CRESTOR 10 mg tablet Generic drug:  rosuvastatin  
half a tablet at night  Indications: hyperlipidemia  
  
 cyclobenzaprine 5 mg tablet Commonly known as:  FLEXERIL  
 Take 1 Tab by mouth three (3) times daily as needed for Muscle Spasm(s). DUREZOL 0.05 % ophthalmic emulsion Generic drug:  Difluprednate Administer 1 Drop to both eyes. 4 times daily for left eye FISH OIL PO Take  by mouth daily. 4 tabs daily. fluocinoNIDE 0.05 % topical cream  
Commonly known as:  LIDEX APPLY  CREAM TOPICALLY TO AFFECTED AREA TWICE DAILY  
  
 fluticasone 50 mcg/actuation nasal spray Commonly known as:  Margarita P[place]ie 2 Sprays by Both Nostrils route daily. furosemide 20 mg tablet Commonly known as:  LASIX TAKE ONE TABLET BY MOUTH ONCE DAILY  
  
 ipratropium 0.02 % Soln Commonly known as:  ATROVENT  
2.5 mL by Nebulization route as needed for Wheezing.  
  
 ketorolac (PF) 0.45 % Dpet ophthalmic solution Commonly known as:  ACUVAIL  
1 Drop four (4) times daily. KLOR-CON M20 20 mEq tablet Generic drug:  potassium chloride TAKE ONE TABLET BY MOUTH ONCE DAILY  
  
 levalbuterol 0.63 mg/3 mL Nebu Commonly known as:  XOPENEX  
3 mL by Nebulization route four (4) times daily as needed. Dx 493.9,786.05, 135  
  
 levalbuterol tartrate 45 mcg/actuation inhaler Commonly known as:  Neeraj Rajputer Take 2 Puffs by inhalation every six (6) hours as needed for Wheezing. losartan 50 mg tablet Commonly known as:  COZAAR Take 25 mg by mouth daily. meloxicam 15 mg tablet Commonly known as:  MOBIC  
TAKE ONE TABLET BY MOUTH ONCE DAILY  
  
 moxifloxacin 0.5 % ophthalmic solution Commonly known as:  Waunita Bras Administer 1 Drop to left eye three (3) times daily. multivitamin tablet Commonly known as:  ONE A DAY Take 1 Tab by mouth daily. nitroglycerin 0.4 mg SL tablet Commonly known as:  NITROSTAT  
DISSOLVE ONE TABLET UNDER THE TONGUE EVERY 5 MINUTES AS NEEDED FOR CHEST PAIN. DO NOT EXCEED A TOTAL OF 3 DOSES IN 15 MINUTES  
  
 predniSONE 5 mg tablet Commonly known as:  Adwoa Raider Take 5 mg by mouth daily. For 10 days PROLENSA 0.07 % ophthalmic solution Generic drug:  bromfenac Administer 1 Drop to both eyes daily. PROTONIX 40 mg tablet Generic drug:  pantoprazole Take 1 Tab by mouth daily. raNITIdine 150 mg tablet Commonly known as:  ZANTAC TAKE ONE TABLET BY MOUTH ONCE DAILY IN THE EVENING  
  
 RESTASIS 0.05 % ophthalmic emulsion Generic drug:  cycloSPORINE Administer 1 Drop to both eyes two (2) times a day. TOVIAZ 8 mg ER tablet Generic drug:  fesoterodine TAKE ONE TABLET BY MOUTH ONCE DAILY  
  
 VITAMIN D3 1,000 unit tablet Generic drug:  cholecalciferol Take 2,000 Units by mouth daily. VOLTAREN 1 % Gel Generic drug:  diclofenac APPLY 4G TO AFFECTED AREA FOUR TIMES DAILY Prescriptions Sent to Pharmacy Refills CRESTOR 10 mg tablet 2 Sig: half a tablet at night  Indications: hyperlipidemia Class: Normal  
 Pharmacy: [address] [place] [address] [address] Ph #: [phone]  
 carvedilol (COREG) 25 mg tablet 2 Sig: TAKE ONE TABLET BY MOUTH IN THE MORNING AND ONE-HALF TAB IN THE EVENING WITH  MEALS Class: Normal  
 Pharmacy: [address] [place] [address] [address] Ph #: [phone] We Performed the Following UT ANNUAL ALCOHOL SCREEN 15 MIN [id number] [facility]] Follow-up Instructions Return in about 4 months (around 6/14/2018), or if symptoms worsen or fail to improve. To-Do List   
 02/14/2018 Lab:  CBC WITH AUTOMATED DIFF   
  
 02/14/2018 Lab:  HEPATIC FUNCTION PANEL   
  
 02/14/2018 Lab:  LIPID PANEL   
  
 02/14/2018 Lab:  METABOLIC PANEL, BASIC   
  
 02/14/2018 Lab:  T4, FREE   
  
 02/14/2018 Lab:  TSH 3RD GENERATION   
  
 02/14/2018 Lab:  URINALYSIS W/ RFLX MICROSCOPIC   
  
 02/14/2018 Lab:  VITAMIN D, 25 HYDROXY   
  
 02/17/2018 Imaging:  [facility] MAMMO BI SCREENING INCL CAD Patient Instructions Medicare Wellness Visit, Female The best way to live healthy is to have a healthy lifestyle by eating a well-balanced diet, exercising regularly, limiting alcohol and stopping smoking. Regular physical exams and screening tests are another way to keep healthy. Preventive exams provided by your health care provider can find health problems before they become diseases or illnesses. Preventive services including immunizations, screening tests, monitoring and exams can help you take care of your own health. All people over age 72 should have a pneumovax  and and a prevnar shot to prevent pneumonia. These are once in a lifetime unless you and your provider decide differently. All people over 65 should have a yearly flu shot and a tetanus vaccine every 10 years. A bone mass density to screen for osteoporosis or thinning of the bones should be done every 2 years after 65. Screening for diabetes mellitus with a blood sugar test should be done every year. Glaucoma is a disease of the eye due to increased ocular pressure that can lead to blindness and it should be done every year by an eye professional. 
 
Cardiovascular screening tests that check for elevated lipids (fatty part of blood) which can lead to heart disease and strokes should be done every 5 years. Colorectal screening that evaluates for blood or polyps in your colon should be done yearly as a stool test or every five years as a flexible sigmoidoscope or every 10 years as a colonoscopy up to age 76. Breast cancer screening with a mammogram is recommended biennially  for women age 54-69. Screening for cervical cancer with a pap smear and pelvic exam is recommended for women after age 72 years every 2 years up to age 79 or when the provider and patient decide to stop. If there is a history of cervical abnormalities or other increased risk for cancer then the test is recommended yearly. Hepatitis C screening is also recommended for anyone born between [birth year] through [address]. A shingles vaccine is also recommended once in a lifetime after age 61. Your Medicare Wellness Exam is recommended annually. Here is a list of your current Health Maintenance items with a due date: 
Health Maintenance Due Topic Date Due  
 Breast Cancer Screening  03/07/2018 Introducing [facility] & [facility] SERVICES! Dear Milena Farooq: Thank you for requesting a Buzzient account. Our records indicate that you already have an active Buzzient account. You can access your account anytime at [email or web address] Member Did you know that you can access your hospital and ER discharge instructions at any time in Buzzient? You can also review all of your test results from your hospital stay or ER visit. Additional Information If you have questions, please visit the Frequently Asked Questions section of the Buzzient website at [email or web address] Remember, Buzzient is NOT to be used for urgent needs. For medical emergencies, dial 911. Now available from your iPhone and Android! Please provide this summary of care documentation to your next provider. Your primary care clinician is listed as Isreal 51. If you have any questions after today's visit, please call [phone].

## 2018-04-11 DIAGNOSIS — Z12.31 ENCOUNTER FOR SCREENING MAMMOGRAM FOR MALIGNANT NEOPLASM OF BREAST: ICD-10-CM

## 2018-04-30 DIAGNOSIS — N32.81 OAB (OVERACTIVE BLADDER): ICD-10-CM

## 2018-04-30 RX ORDER — FESOTERODINE FUMARATE 8 MG/1
TABLET, EXTENDED RELEASE ORAL
Qty: 90 TAB | Refills: 0 | Status: SHIPPED | OUTPATIENT
Start: 2018-04-30 | End: 2018-07-30 | Stop reason: SDUPTHER

## 2018-05-02 ENCOUNTER — HOSPITAL ENCOUNTER (OUTPATIENT)
Dept: LAB | Age: 63
Discharge: HOME | End: 2018-05-02
Payer: MEDICARE

## 2018-05-02 DIAGNOSIS — E55.9 HYPOVITAMINOSIS D: ICD-10-CM

## 2018-05-02 DIAGNOSIS — E78.00 HYPERCHOLESTEROLEMIA: ICD-10-CM

## 2018-05-02 LAB
25(OH)D3 SERPL-MCNC: 83.2 NG/ML (ref 30–100)
ALBUMIN SERPL-MCNC: 3.5 G/DL (ref 3.4–5)
ALBUMIN/GLOB SERPL: 0.9 {RATIO} (ref 0.8–1.7)
ALP SERPL-CCNC: 74 U/L (ref 45–117)
ALT SERPL-CCNC: 38 U/L (ref 13–56)
ANION GAP SERPL CALC-SCNC: 5 MMOL/L (ref 3–18)
APPEARANCE UR: CLEAR
AST SERPL-CCNC: 32 U/L (ref 15–37)
BACTERIA URNS QL MICRO: ABNORMAL /HPF
BASOPHILS # BLD: 0 K/UL (ref 0–0.06)
BASOPHILS NFR BLD: 1 % (ref 0–2)
BILIRUB DIRECT SERPL-MCNC: 0.2 MG/DL (ref 0–0.2)
BILIRUB SERPL-MCNC: 0.8 MG/DL (ref 0.2–1)
BILIRUB UR QL: NEGATIVE
BUN SERPL-MCNC: 13 MG/DL (ref 7–18)
BUN/CREAT SERPL: 12 (ref 12–20)
CALCIUM SERPL-MCNC: 8.7 MG/DL (ref 8.5–10.1)
CHLORIDE SERPL-SCNC: 104 MMOL/L (ref 100–108)
CHOLEST SERPL-MCNC: 122 MG/DL
CO2 SERPL-SCNC: 31 MMOL/L (ref 21–32)
COLOR UR: YELLOW
CREAT SERPL-MCNC: 1.1 MG/DL (ref 0.6–1.3)
DIFFERENTIAL METHOD BLD: ABNORMAL
EOSINOPHIL # BLD: 0.2 K/UL (ref 0–0.4)
EOSINOPHIL NFR BLD: 8 % (ref 0–5)
EPITH CASTS URNS QL MICRO: ABNORMAL /LPF (ref 0–5)
ERYTHROCYTE [DISTWIDTH] IN BLOOD BY AUTOMATED COUNT: 13.8 % (ref 11.6–14.5)
GLOBULIN SER CALC-MCNC: 4.1 G/DL (ref 2–4)
GLUCOSE SERPL-MCNC: 83 MG/DL (ref 74–99)
GLUCOSE UR STRIP.AUTO-MCNC: NEGATIVE MG/DL
HCT VFR BLD AUTO: 41.1 % (ref 35–45)
HDLC SERPL-MCNC: 52 MG/DL (ref 40–60)
HDLC SERPL: 2.3 {RATIO} (ref 0–5)
HGB BLD-MCNC: 12.8 G/DL (ref 12–16)
HGB UR QL STRIP: NEGATIVE
KETONES UR QL STRIP.AUTO: NEGATIVE MG/DL
LDLC SERPL CALC-MCNC: 61.6 MG/DL (ref 0–100)
LEUKOCYTE ESTERASE UR QL STRIP.AUTO: ABNORMAL
LIPID PROFILE,FLP: NORMAL
LYMPHOCYTES # BLD: 0.8 K/UL (ref 0.9–3.6)
LYMPHOCYTES NFR BLD: 26 % (ref 21–52)
MCH RBC QN AUTO: 28.6 PG (ref 24–34)
MCHC RBC AUTO-ENTMCNC: 31.1 G/DL (ref 31–37)
MCV RBC AUTO: 91.9 FL (ref 74–97)
MONOCYTES # BLD: 0.5 K/UL (ref 0.05–1.2)
MONOCYTES NFR BLD: 18 % (ref 3–10)
NEUTS SEG # BLD: 1.4 K/UL (ref 1.8–8)
NEUTS SEG NFR BLD: 47 % (ref 40–73)
NITRITE UR QL STRIP.AUTO: POSITIVE
PH UR STRIP: 5.5 [PH] (ref 5–8)
PLATELET # BLD AUTO: 209 K/UL (ref 135–420)
PMV BLD AUTO: 10.7 FL (ref 9.2–11.8)
POTASSIUM SERPL-SCNC: 4.9 MMOL/L (ref 3.5–5.5)
PROT SERPL-MCNC: 7.6 G/DL (ref 6.4–8.2)
PROT UR STRIP-MCNC: NEGATIVE MG/DL
RBC # BLD AUTO: 4.47 M/UL (ref 4.2–5.3)
RBC #/AREA URNS HPF: 0 /HPF (ref 0–5)
SODIUM SERPL-SCNC: 140 MMOL/L (ref 136–145)
SP GR UR REFRACTOMETRY: 1.01 (ref 1–1.03)
T4 FREE SERPL-MCNC: 1.4 NG/DL (ref 0.7–1.5)
TRIGL SERPL-MCNC: 42 MG/DL (ref ?–150)
TSH SERPL DL<=0.05 MIU/L-ACNC: 0.73 UIU/ML (ref 0.36–3.74)
UROBILINOGEN UR QL STRIP.AUTO: 0.2 EU/DL (ref 0.2–1)
VLDLC SERPL CALC-MCNC: 8.4 MG/DL
WBC # BLD AUTO: 2.9 K/UL (ref 4.6–13.2)
WBC URNS QL MICRO: ABNORMAL /HPF (ref 0–4)

## 2018-05-02 PROCEDURE — 84439 ASSAY OF FREE THYROXINE: CPT | Performed by: INTERNAL MEDICINE

## 2018-05-02 PROCEDURE — 80048 BASIC METABOLIC PNL TOTAL CA: CPT | Performed by: INTERNAL MEDICINE

## 2018-05-02 PROCEDURE — 85025 COMPLETE CBC W/AUTO DIFF WBC: CPT | Performed by: INTERNAL MEDICINE

## 2018-05-02 PROCEDURE — 82306 VITAMIN D 25 HYDROXY: CPT | Performed by: INTERNAL MEDICINE

## 2018-05-02 PROCEDURE — 36415 COLL VENOUS BLD VENIPUNCTURE: CPT | Performed by: INTERNAL MEDICINE

## 2018-05-02 PROCEDURE — 80076 HEPATIC FUNCTION PANEL: CPT | Performed by: INTERNAL MEDICINE

## 2018-05-02 PROCEDURE — 84443 ASSAY THYROID STIM HORMONE: CPT | Performed by: INTERNAL MEDICINE

## 2018-05-02 PROCEDURE — 80061 LIPID PANEL: CPT | Performed by: INTERNAL MEDICINE

## 2018-05-02 PROCEDURE — 81001 URINALYSIS AUTO W/SCOPE: CPT | Performed by: INTERNAL MEDICINE

## 2018-05-08 ENCOUNTER — TELEPHONE (OUTPATIENT)
Dept: FAMILY MEDICINE CLINIC | Age: 63
End: 2018-05-08

## 2018-05-08 RX ORDER — NITROFURANTOIN 25; 75 MG/1; MG/1
100 CAPSULE ORAL 2 TIMES DAILY
Qty: 14 CAP | Refills: 0 | Status: SHIPPED | OUTPATIENT
Start: 2018-05-08 | End: 2018-09-18 | Stop reason: ALTCHOICE

## 2018-05-09 NOTE — TELEPHONE ENCOUNTER
Her urine seems to show some bacterial growth. I have sent in an Abx for her. What are her symptoms?

## 2018-06-04 ENCOUNTER — TELEPHONE (OUTPATIENT)
Dept: FAMILY MEDICINE CLINIC | Age: 63
End: 2018-06-04

## 2018-06-04 NOTE — TELEPHONE ENCOUNTER
Patient called wanting to check on how much vitamin d she should be taking she didn't realize the vitamin d she had was a 5,000 unit pill and she went to get calcium and there isn't one that doesn't include Vitamin d should she get another vitamin d 3 that is a 1,000 so her total would be 1,400 units a day with the 400 units of vitamin d that is in her calcium  please advise

## 2018-06-13 ENCOUNTER — OFFICE VISIT (OUTPATIENT)
Dept: FAMILY MEDICINE CLINIC | Age: 63
End: 2018-06-13

## 2018-06-13 VITALS
SYSTOLIC BLOOD PRESSURE: 104 MMHG | HEIGHT: 63 IN | WEIGHT: 191 LBS | RESPIRATION RATE: 18 BRPM | HEART RATE: 68 BPM | BODY MASS INDEX: 33.84 KG/M2 | TEMPERATURE: 97.4 F | OXYGEN SATURATION: 98 % | DIASTOLIC BLOOD PRESSURE: 68 MMHG

## 2018-06-13 DIAGNOSIS — M19.90 ARTHRITIS: ICD-10-CM

## 2018-06-13 DIAGNOSIS — I10 ESSENTIAL HYPERTENSION: Primary | ICD-10-CM

## 2018-06-13 DIAGNOSIS — E78.00 HYPERCHOLESTEROLEMIA: ICD-10-CM

## 2018-06-13 RX ORDER — NITROFURANTOIN 25; 75 MG/1; MG/1
100 CAPSULE ORAL 2 TIMES DAILY
Qty: 20 CAP | Refills: 0 | Status: SHIPPED | OUTPATIENT
Start: 2018-06-13 | End: 2018-06-23

## 2018-06-13 RX ORDER — AZITHROMYCIN 250 MG/1
TABLET, FILM COATED ORAL
Qty: 6 TAB | Refills: 0 | Status: SHIPPED | OUTPATIENT
Start: 2018-06-13 | End: 2018-06-18

## 2018-06-13 NOTE — PATIENT INSTRUCTIONS

## 2018-06-13 NOTE — MR AVS SNAPSHOT
[address] [place] [address]  [address] 5658 [facility] [id number] [phone] Patient: Varghese Trejo MRN: [id number] OTS:6/84/6769 Visit Information Date & Time Provider Department Dept. Phone Encounter #  
 6/13/2018  7:30 AM Taylor Barrett, 3 [facility] [phone] [id number] Upcoming Health Maintenance Date Due Influenza Age 5 to Adult 8/1/2018 MEDICARE YEARLY EXAM 2/15/2019 BREAST CANCER SCRN MAMMOGRAM 3/8/2019 DTaP/Tdap/Td series (2 - Td) 2/24/2025 COLONOSCOPY 5/31/2026 Allergies as of 6/13/2018  Review Complete On: 6/13/2018 By: Taylor Barrett MD  
  
 Severity Noted Reaction Type Reactions Asa-acetaminophen-caff-potass  04/08/2010    Other (comments) GI upset Bextra [Valdecoxib]  04/08/2010    Other (comments) GI upset Celebrex [Celecoxib]  04/08/2010    Other (comments) GI upset Naproxen  11/11/2014    Rash Pcn [Penicillins]  04/08/2010    Swelling Sulfur  04/08/2010    Rash Current Immunizations  Reviewed on 2/21/2017 Name Date Influenza Vaccine 10/9/2015, 10/15/2014, 10/30/2013 Influenza Vaccine (Quad) PF 9/21/2017  8:07 AM, 10/25/2016  3:39 PM  
 Influenza Vaccine Split 10/11/2012 Influenza Vaccine Whole 11/24/2010 Pneumococcal Polysaccharide (PPSV-23) 11/16/2016 Tdap 2/24/2015 Zoster Vaccine, Live 9/23/2017 Not reviewed this visit You Were Diagnosed With   
  
 Codes Comments Essential hypertension    -  Primary ICD-10-CM: I10 
ICD-9-CM: 401.9 Hypercholesterolemia     ICD-10-CM: E78.00 ICD-9-CM: 272.0 Vitals BP Pulse Temp Resp Height(growth percentile) Weight(growth percentile) 104/68 (BP 1 Location: Left arm, BP Patient Position: Sitting) 68 97.4 °F (36.3 °C) (Oral) 18 5' 3\" (1.6 m) 191 lb (86.6 kg) SpO2 BMI OB Status Smoking Status 98% 33.83 kg/m2 Hysterectomy Former Smoker Vitals History Please refer to the daily flowsheet for treatment today.    BMI and BSA Data Body Mass Index Body Surface Area  
 33.83 kg/m 2 1.96 m 2 Preferred Pharmacy Pharmacy Name Phone [address], [address] [phone] Your Updated Medication List  
  
   
This list is accurate as of 6/13/18  7:56 AM.  Always use your most recent med list. ALLEGRA 180 mg tablet Generic drug:  fexofenadine Take  by mouth daily. ammonium lactate 12 % topical cream  
Commonly known as:  AMLACTIN  
APPLY CREAM TOPICALLY TO AFFECTED AREA TWICE DAILY. (RUB IN AFFECTED AREA WELL) aspirin delayed-release 81 mg tablet Take 81 mg by mouth daily. azithromycin 250 mg tablet Commonly known as:  Harshad Gist Take 2 tablets today, then take 1 tablet daily BIOTIN PO Take  by mouth daily. CALCIUM 600 WITH VITAMIN D3 600 mg(1,500mg) -400 unit Chew Generic drug:  Calcium-Cholecalciferol (D3) Take 1,200 mg by mouth every other day. carvedilol 25 mg tablet Commonly known as:  COREG  
TAKE ONE TABLET BY MOUTH IN THE MORNING AND ONE-HALF TAB IN THE EVENING WITH  MEALS  
  
 CLARITIN 10 mg tablet Generic drug:  loratadine Take 10 mg by mouth. CRESTOR 10 mg tablet Generic drug:  rosuvastatin  
half a tablet at night  Indications: hyperlipidemia  
  
 cyclobenzaprine 5 mg tablet Commonly known as:  FLEXERIL Take 1 Tab by mouth three (3) times daily as needed for Muscle Spasm(s). DUREZOL 0.05 % ophthalmic emulsion Generic drug:  Difluprednate Administer 1 Drop to both eyes. 4 times daily for left eye  
  
 fesoterodine 8 mg ER tablet Commonly known as:  [employer] TAKE ONE TABLET BY MOUTH ONCE DAILY FISH OIL PO Take  by mouth daily. 4 tabs daily. fluocinoNIDE 0.05 % topical cream  
Commonly known as:  LIDEX APPLY  CREAM TOPICALLY TO AFFECTED AREA TWICE DAILY  
  
 fluticasone 50 mcg/actuation nasal spray Commonly known as:  Philip Yancey 2 Sprays by Both Nostrils route daily. furosemide 20 mg tablet Commonly known as:  LASIX TAKE ONE TABLET BY MOUTH ONCE DAILY  
  
 ipratropium 0.02 % Soln Commonly known as:  ATROVENT  
2.5 mL by Nebulization route as needed for Wheezing.  
  
 ketorolac (PF) 0.45 % Dpet ophthalmic solution Commonly known as:  ACUVAIL  
1 Drop four (4) times daily. KLOR-CON M20 20 mEq tablet Generic drug:  potassium chloride TAKE ONE TABLET BY MOUTH ONCE DAILY  
  
 levalbuterol 0.63 mg/3 mL Nebu Commonly known as:  XOPENEX  
3 mL by Nebulization route four (4) times daily as needed. Dx 493.9,786.05, 135  
  
 levalbuterol tartrate 45 mcg/actuation inhaler Commonly known as:  Verena Maclachlan Take 2 Puffs by inhalation every six (6) hours as needed for Wheezing. losartan 50 mg tablet Commonly known as:  COZAAR Take 25 mg by mouth daily. meloxicam 15 mg tablet Commonly known as:  MOBIC  
TAKE ONE TABLET BY MOUTH ONCE DAILY  
  
 moxifloxacin 0.5 % ophthalmic solution Commonly known as:  Christoph Days Administer 1 Drop to left eye three (3) times daily. multivitamin tablet Commonly known as:  ONE A DAY Take 1 Tab by mouth daily. * nitrofurantoin (macrocrystal-monohydrate) 100 mg capsule Commonly known as:  MACROBID Take 1 Cap by mouth two (2) times a day. * nitrofurantoin (macrocrystal-monohydrate) 100 mg capsule Commonly known as:  MACROBID Take 1 Cap by mouth two (2) times a day for 10 days. Start this after Z-pack  
  
 nitroglycerin 0.4 mg SL tablet Commonly known as:  NITROSTAT  
DISSOLVE ONE TABLET UNDER THE TONGUE EVERY 5 MINUTES AS NEEDED FOR CHEST PAIN. DO NOT EXCEED A TOTAL OF 3 DOSES IN 15 MINUTES  
  
 predniSONE 5 mg tablet Commonly known as:  Ancel Clock Take 5 mg by mouth daily. For 10 days PROLENSA 0.07 % ophthalmic solution Generic drug:  bromfenac Administer 1 Drop to both eyes daily. PROTONIX 40 mg tablet Generic drug:  pantoprazole Take 1 Tab by mouth daily. raNITIdine 150 mg tablet Commonly known as:  ZANTAC TAKE ONE TABLET BY MOUTH ONCE DAILY IN THE EVENING  
  
 RESTASIS 0.05 % ophthalmic emulsion Generic drug:  cycloSPORINE Administer 1 Drop to both eyes two (2) times a day. VITAMIN D3 1,000 unit tablet Generic drug:  cholecalciferol Take 2,000 Units by mouth daily. VOLTAREN 1 % Gel Generic drug:  diclofenac APPLY 4G TO AFFECTED AREA FOUR TIMES DAILY * Notice: This list has 2 medication(s) that are the same as other medications prescribed for you. Read the directions carefully, and ask your doctor or other care provider to review them with you. Prescriptions Sent to Pharmacy Refills  
 azithromycin (ZITHROMAX) 250 mg tablet 0 Sig: Take 2 tablets today, then take 1 tablet daily Class: Normal  
 Pharmacy: [facility] DR VALARIE INFANTE [address] [place] [address], 2 [facility] [address] Ph #: [phone]  
 nitrofurantoin, macrocrystal-monohydrate, (MACROBID) 100 mg capsule 0 Sig: Take 1 Cap by mouth two (2) times a day for 10 days. Start this after Z-pack Class: Normal  
 Pharmacy: [facility] DR VALARIE INFANTE [address] [place] [address], [address] Ph #: [phone] Route: Oral  
  
Patient Instructions High Cholesterol: Care Instructions Your Care Instructions Cholesterol is a type of fat in your blood. It is needed for many body functions, such as making new cells. Cholesterol is made by your body. It also comes from food you eat. High cholesterol means that you have too much of the fat in your blood. This raises your risk of a heart attack and stroke. LDL and HDL are part of your total cholesterol. LDL is the \"bad\" cholesterol. High LDL can raise your risk for heart disease, heart attack, and stroke. HDL is the \"good\" cholesterol. It helps clear bad cholesterol from the body.  High HDL is linked with a lower risk of heart disease, heart attack, and stroke. Your cholesterol levels help your doctor find out your risk for having a heart attack or stroke. You and your doctor can talk about whether you need to lower your risk and what treatment is best for you. A heart-healthy lifestyle along with medicines can help lower your cholesterol and your risk. The way you choose to lower your risk will depend on how high your risk is for heart attack and stroke. It will also depend on how you feel about taking medicines. Follow-up care is a key part of your treatment and safety. Be sure to make and go to all appointments, and call your doctor if you are having problems. It's also a good idea to know your test results and keep a list of the medicines you take. How can you care for yourself at home? · Eat a variety of foods every day. Good choices include fruits, vegetables, whole grains (like oatmeal), dried beans and peas, nuts and seeds, soy products (like tofu), and fat-free or low-fat dairy products. · Replace butter, margarine, and hydrogenated or partially hydrogenated oils with olive and canola oils. (Canola oil margarine without trans fat is fine.) · Replace red meat with fish, poultry, and soy protein (like tofu). · Limit processed and packaged foods like chips, crackers, and cookies. · Bake, broil, or steam foods. Don't segundo them. · Be physically active. Get at least 30 minutes of exercise on most days of the week. Walking is a good choice. You also may want to do other activities, such as running, swimming, cycling, or playing tennis or team sports. · Stay at a healthy weight or lose weight by making the changes in eating and physical activity listed above. Losing just a small amount of weight, even 5 to 10 pounds, can reduce your risk for having a heart attack or stroke. · Do not smoke. When should you call for help? Watch closely for changes in your health, and be sure to contact your doctor if: ? · You need help making lifestyle changes. ? · You have questions about your medicine. Where can you learn more? Go to http://john-yasir.info/. Enter P622 in the search box to learn more about \"High Cholesterol: Care Instructions. \" Current as of: September 21, 2016 Content Version: 11.4 © 8224-1917 SpinSnap. Care instructions adapted under license by [employer] (which disclaims liability or warranty for this information). If you have questions about a medical condition or this instruction, always ask your healthcare professional. [address] any warranty or liability for your use of this information. Introducing [facility] & HEALTH SERVICES! Dear Kayley Fields: Thank you for requesting a SportsCrunch account. Our records indicate that you already have an active SportsCrunch account. You can access your account anytime at [email or web address] Did you know that you can access your hospital and ER discharge instructions at any time in SportsCrunch? You can also review all of your test results from your hospital stay or ER visit. Additional Information If you have questions, please visit the Frequently Asked Questions section of the SportsCrunch website at [email or web address] Remember, SportsCrunch is NOT to be used for urgent needs. For medical emergencies, dial 911. Now available from your iPhone and Android! Please provide this summary of care documentation to your next provider. Your primary care clinician is listed as Isreal 51. If you have any questions after today's visit, please call [phone].

## 2018-06-13 NOTE — PROGRESS NOTES
Assessment/Plan:    *Diagnoses and all orders for this visit:    1. Essential hypertension    2. Hypercholesterolemia    3. Arthritis    Other orders  -     azithromycin (ZITHROMAX) 250 mg tablet; Take 2 tablets today, then take 1 tablet daily  -     nitrofurantoin, macrocrystal-monohydrate, (MACROBID) 100 mg capsule; Take 1 Cap by mouth two (2) times a day for 10 days. Start this after Z-pack        The plan was discussed with the patient. The patient verbalized understanding and is in agreement with the plan. All medication potential side effects were discussed with the patient.    -------------------------------------------------------------------------------------------------------------------        Unruly Jules is a 59 y.o. female and presents with Hypertension         Subjective:  Pt here for f/u. HTN: well controlled. HLD: well controlled. Arthritis: LT foot gave way on her this weekend, she hit her RT knee. Is feeling fine, just a skin abrasion. Has evidence of UTI on UA. ROS:  Constitutional: No recent weight change. No weakness/fatigue. No f/c. Skin: No rashes, change in nails/hair, itching   HENT: No HA, dizziness. No hearing loss/tinnitus. No nasal congestion/discharge. Eyes: No change in vision, double/blurred vision or eye pain/redness. Cardiovascular: No CP/palpitations. No AMADO/orthopnea/PND. Respiratory: No cough/sputum, dyspnea, wheezing. Gastointestinal: No dysphagia, reflux. No n/v. No constipation/diarrhea. No melena/rectal bleeding. Genitourinary: No dysuria, urinary hesitancy, nocturia, hematuria. No incontinence. Musculoskeletal: No joint pain/stiffness. No muscle pain/tenderness. Endo: No heat/cold intolerance, no polyuria/polydypsia. Heme: No h/o anemia. No easy bleeding/bruising. Allergy/Immunology: No seasonal rhinitis. Denies frequent colds, sinus/ear infections. Neurological: No seizures/numbness/weakness. No paresthesias. Psychiatric:  No depression, anxiety. The problem list was updated as a part of today's visit. Patient Active Problem List   Diagnosis Code    Arthritis M19.90    CAD (coronary artery disease) I25.10    GERD (gastroesophageal reflux disease) K21.9    Sarcoidosis D86.9    Ganglion cyst of wrist M67.439    Posterior vitreous detachment H43.819    Hypercholesterolemia E78.00    Urinary incontinence R32    Allergy, unspecified not elsewhere classified T78.40XA    CHF (congestive heart failure) (HCC) I50.9    HTN (hypertension) I10    Atlanto-axial subluxation S13.120A    RAD (reactive airway disease) J45.909    Advance care planning Z71.89       The PSH, FH were reviewed. SH:  Social History   Substance Use Topics    Smoking status: Former Smoker     Quit date: 8/1/1986    Smokeless tobacco: Former User    Alcohol use No       Medications/Allergies:  Current Outpatient Prescriptions on File Prior to Visit   Medication Sig Dispense Refill    KLOR-CON M20 20 mEq tablet TAKE ONE TABLET BY MOUTH ONCE DAILY 90 Tab 1    fesoterodine (TOVIAZ) 8 mg ER tablet TAKE ONE TABLET BY MOUTH ONCE DAILY 90 Tab 0    Calcium-Cholecalciferol, D3, (CALCIUM 600 WITH VITAMIN D3) 600 mg(1,500mg) -400 unit chew Take 1,200 mg by mouth every other day.  CRESTOR 10 mg tablet half a tablet at night  Indications: hyperlipidemia 45 Tab 2    carvedilol (COREG) 25 mg tablet TAKE ONE TABLET BY MOUTH IN THE MORNING AND ONE-HALF TAB IN THE EVENING WITH  MEALS 135 Tab 2    raNITIdine (ZANTAC) 150 mg tablet TAKE ONE TABLET BY MOUTH ONCE DAILY IN THE EVENING 90 Tab 2    meloxicam (MOBIC) 15 mg tablet TAKE ONE TABLET BY MOUTH ONCE DAILY 90 Tab 1    furosemide (LASIX) 20 mg tablet TAKE ONE TABLET BY MOUTH ONCE DAILY 90 Tab 1    PROTONIX 40 mg tablet Take 1 Tab by mouth daily. 90 Tab 2    levalbuterol tartrate (XOPENEX) 45 mcg/actuation inhaler Take 2 Puffs by inhalation every six (6) hours as needed for Wheezing.  1 Inhaler 2    loratadine (CLARITIN) 10 mg tablet Take 10 mg by mouth.  fluticasone (FLONASE) 50 mcg/actuation nasal spray 2 Sprays by Both Nostrils route daily. 3 Bottle 3    nitroglycerin (NITROSTAT) 0.4 mg SL tablet DISSOLVE ONE TABLET UNDER THE TONGUE EVERY 5 MINUTES AS NEEDED FOR CHEST PAIN. DO NOT EXCEED A TOTAL OF 3 DOSES IN 15 MINUTES 50 Tab 0    bromfenac (PROLENSA) 0.07 % ophthalmic solution Administer 1 Drop to both eyes daily.  Difluprednate (DUREZOL) 0.05 % ophthalmic emulsion Administer 1 Drop to both eyes. 4 times daily for left eye      VOLTAREN 1 % gel APPLY 4G TO AFFECTED AREA FOUR TIMES DAILY 400 g 2    fluocinoNIDE (LIDEX) 0.05 % topical cream APPLY  CREAM TOPICALLY TO AFFECTED AREA TWICE DAILY 45 g 1    ammonium lactate (AMLACTIN) 12 % topical cream APPLY CREAM TOPICALLY TO AFFECTED AREA TWICE DAILY. (RUB IN AFFECTED AREA WELL) 385 g 2    cyclobenzaprine (FLEXERIL) 5 mg tablet Take 1 Tab by mouth three (3) times daily as needed for Muscle Spasm(s). 40 Tab 1    BIOTIN PO Take  by mouth daily.  losartan (COZAAR) 50 mg tablet Take 25 mg by mouth daily.  fexofenadine (ALLEGRA) 180 mg tablet Take  by mouth daily.  cholecalciferol, vitamin d3, (VITAMIN D) 1,000 unit tablet Take 2,000 Units by mouth daily.  aspirin delayed-release 81 mg tablet Take 81 mg by mouth daily.  multivitamin (ONE A DAY) tablet Take 1 Tab by mouth daily.  DOCOSAHEXANOIC ACID/EPA (FISH OIL PO) Take  by mouth daily. 4 tabs daily.  cyclosporin (RESTASIS) 0.05 % ophthalmic emulsion Administer 1 Drop to both eyes two (2) times a day.  nitrofurantoin, macrocrystal-monohydrate, (MACROBID) 100 mg capsule Take 1 Cap by mouth two (2) times a day. 14 Cap 0    predniSONE (DELTASONE) 5 mg tablet Take 5 mg by mouth daily. For 10 days      ketorolac, PF, (ACUVAIL) 0.45 % dpet ophthalmic solution 1 Drop four (4) times daily.       moxifloxacin (VIGAMOX) 0.5 % ophthalmic solution Administer 1 Drop to left eye three (3) times daily.  levalbuterol (XOPENEX) 0.63 mg/3 mL nebu 3 mL by Nebulization route four (4) times daily as needed. Dx 493.9,786.05, 135 1 Package 3    ipratropium (ATROVENT) 0.02 % nebulizer solution 2.5 mL by Nebulization route as needed for Wheezing. 2.5 mL 0     No current facility-administered medications on file prior to visit. Allergies   Allergen Reactions    Asa-Acetaminophen-Caff-Potass Other (comments)     GI upset    Bextra [Valdecoxib] Other (comments)     GI upset    Celebrex [Celecoxib] Other (comments)     GI upset    Naproxen Rash    Pcn [Penicillins] Swelling    Sulfur Rash         Health Maintenance:   Health Maintenance   Topic Date Due    Influenza Age 5 to Adult  08/01/2018    MEDICARE YEARLY EXAM  02/15/2019    BREAST CANCER SCRN MAMMOGRAM  03/08/2019    DTaP/Tdap/Td series (2 - Td) 02/24/2025    COLONOSCOPY  05/31/2026    Hepatitis C Screening  Completed    ZOSTER VACCINE AGE 60>  Addressed    Pneumococcal 19-64 Medium Risk  Completed       Objective:  Visit Vitals    /68 (BP 1 Location: Left arm, BP Patient Position: Sitting)    Pulse 68    Temp 97.4 °F (36.3 °C) (Oral)    Resp 18    Ht 5' 3\" (1.6 m)    Wt 191 lb (86.6 kg)    SpO2 98%    BMI 33.83 kg/m2          Nurses notes and VS reviewed.       Physical Examination: General appearance - alert, well appearing, and in no distress  Chest - clear to auscultation, no wheezes, rales or rhonchi, symmetric air entry  Heart - normal rate, regular rhythm, normal S1, S2, no murmurs, rubs, clicks or gallops  Extremities - peripheral pulses normal, no pedal edema, no clubbing or cyanosis         Labwork and Ancillary Studies:    CBC w/Diff  Lab Results   Component Value Date/Time    WBC 2.9 (L) 05/02/2018 07:30 AM    HGB 12.8 05/02/2018 07:30 AM    PLATELET 829 69/81/0370 07:30 AM         Basic Metabolic Profile  Lab Results   Component Value Date/Time    Sodium 140 05/02/2018 07:30 AM    Potassium 4.9 05/02/2018 07:30 AM    Chloride 104 05/02/2018 07:30 AM    CO2 31 05/02/2018 07:30 AM    Anion gap 5 05/02/2018 07:30 AM    Glucose 83 05/02/2018 07:30 AM    BUN 13 05/02/2018 07:30 AM    Creatinine 1.10 05/02/2018 07:30 AM    BUN/Creatinine ratio 12 05/02/2018 07:30 AM    GFR est AA >60 05/02/2018 07:30 AM    GFR est non-AA 50 (L) 05/02/2018 07:30 AM    Calcium 8.7 05/02/2018 07:30 AM         LFT  Lab Results   Component Value Date/Time    ALT (SGPT) 38 05/02/2018 07:30 AM    AST (SGOT) 32 05/02/2018 07:30 AM    Alk.  phosphatase 74 05/02/2018 07:30 AM    Bilirubin, direct 0.2 05/02/2018 07:30 AM    Bilirubin, total 0.8 05/02/2018 07:30 AM         Cholesterol  Lab Results   Component Value Date/Time    Cholesterol, total 122 05/02/2018 07:30 AM    HDL Cholesterol 52 05/02/2018 07:30 AM    LDL, calculated 61.6 05/02/2018 07:30 AM    Triglyceride 42 05/02/2018 07:30 AM    CHOL/HDL Ratio 2.3 05/02/2018 07:30 AM

## 2018-06-13 NOTE — PROGRESS NOTES
Guerita Cespedes is a 59 y.o. female (: 1955) presenting to address:    Chief Complaint   Patient presents with    Hypertension       Vitals:    18 0721   BP: 104/68   Pulse: 68   Resp: 18   Temp: 97.4 °F (36.3 °C)   TempSrc: Oral   SpO2: 98%   Weight: 191 lb (86.6 kg)   Height: 5' 3\" (1.6 m)   PainSc:   5   PainLoc: Head       Hearing/Vision:   No exam data present    Learning Assessment:     Learning Assessment 2015   PRIMARY LEARNER Patient   HIGHEST LEVEL OF EDUCATION - PRIMARY LEARNER  2 YEARS OF COLLEGE   BARRIERS PRIMARY LEARNER NONE   CO-LEARNER CAREGIVER No   PRIMARY LANGUAGE ENGLISH   LEARNER PREFERENCE PRIMARY READING   ANSWERED BY self   RELATIONSHIP SELF     Depression Screening:     PHQ over the last two weeks 2018   Little interest or pleasure in doing things Not at all   Feeling down, depressed or hopeless Not at all   Total Score PHQ 2 0     Fall Risk Assessment:     Fall Risk Assessment, last 12 mths 2018   Able to walk? Yes   Fall in past 12 months? Yes   Fall with injury? No   Number of falls in past 12 months 2   Fall Risk Score 2     Abuse Screening:     Abuse Screening Questionnaire 2018   Do you ever feel afraid of your partner? N   Are you in a relationship with someone who physically or mentally threatens you? N   Is it safe for you to go home? Y     Coordination of Care Questionaire:   1. Have you been to the ER, urgent care clinic since your last visit? Hospitalized since your last visit? No     2. Have you seen or consulted any other health care providers outside of the [address] [place] [address] since your last visit? Include any pap smears or colon screening Yes, eye exam, retina specialist and cardiology and had a defribrillator check     Advanced Directive:   1. Do you have an Advanced Directive? No       2. Would you like information on Advanced Directives?    No

## 2018-07-04 RX ORDER — MELOXICAM 15 MG/1
TABLET ORAL
Qty: 90 TAB | Refills: 1 | Status: SHIPPED | OUTPATIENT
Start: 2018-07-04 | End: 2018-12-31 | Stop reason: SDUPTHER

## 2018-07-05 RX ORDER — MELOXICAM 15 MG/1
TABLET ORAL
Qty: 90 TAB | Refills: 1 | Status: SHIPPED | OUTPATIENT
Start: 2018-07-05 | End: 2018-09-18 | Stop reason: SDUPTHER

## 2018-07-12 RX ORDER — PANTOPRAZOLE SODIUM 40 MG
40 TABLET, DELAYED RELEASE (ENTERIC COATED) ORAL DAILY
Qty: 90 TAB | Refills: 0 | Status: SHIPPED | OUTPATIENT
Start: 2018-07-12 | End: 2019-01-04 | Stop reason: SDUPTHER

## 2018-07-12 NOTE — TELEPHONE ENCOUNTER
Patient called requesting refill on brand name Protonix last refilled 10/19/2017 for 90 with 2 .  Last Ov 6/13/2018

## 2018-07-13 NOTE — TELEPHONE ENCOUNTER
Pt called stating that she still does not have prescription available for Protonix 40 mg at pharmacy. I went to check the information and see that the rx was confirmed by the pharmacy 7/12/18 @ 9:34am but there is another note in our system with the same information showing the medication as discontinued. Pt would like to know what needs to be done so she can have this prescription filled. She also in the case of not taking the medication causes he stomach to become upset and pt begins to vomit. Please advise.

## 2018-07-17 RX ORDER — FUROSEMIDE 20 MG/1
TABLET ORAL
Qty: 90 TAB | Refills: 1 | Status: SHIPPED | OUTPATIENT
Start: 2018-07-17 | End: 2018-07-18 | Stop reason: SDUPTHER

## 2018-07-18 RX ORDER — FUROSEMIDE 20 MG/1
TABLET ORAL
Qty: 90 TAB | Refills: 1 | Status: SHIPPED | OUTPATIENT
Start: 2018-07-18 | End: 2019-02-05 | Stop reason: SDUPTHER

## 2018-08-31 NOTE — TELEPHONE ENCOUNTER
Pt left msg on nurse VM asking for losartan refill stating it will run out over the weekend. It appears as historical med. Called pt, left vm. Is losartan written by her cardio? Please advise. Pt asked for the Rx to [facility].

## 2018-08-31 NOTE — TELEPHONE ENCOUNTER
Pt will call [facility] and call back. Per Juancho Mazariegos is on the bottle. Pt does not know who Dr. Rima Mazariegos is but insists she needs this medicine. Pt says I am missing something but she will call Dr. Radha Shafer to verify all of this. I called [facility]. They state Luigi Los phone [phone]  wrote this med 9/7/17 with refills and 6/3/18 for 90 pills for 25 mg.I left [facility] with Dr. Rosalind Arcos nurse as well.

## 2018-08-31 NOTE — TELEPHONE ENCOUNTER
Please either verify dosing with patient or her pharmacy re: this med, as the last cardiology office note from 5/3/2018 in Care Everywhere states:    Carvedilol (COREG) 25 mg PO TABS Take 25 mg by Mouth See Admin Instrs. 25 mg po q am and 12.5 mg po q pm.     Will authorize Rx once dosing clarified. Thanks.

## 2018-09-03 RX ORDER — LOSARTAN POTASSIUM 50 MG/1
25 TABLET ORAL DAILY
Qty: 30 TAB | Refills: 0 | Status: SHIPPED | OUTPATIENT
Start: 2018-09-03 | End: 2018-09-18 | Stop reason: DRUGHIGH

## 2018-09-18 ENCOUNTER — OFFICE VISIT (OUTPATIENT)
Dept: FAMILY MEDICINE CLINIC | Age: 63
End: 2018-09-18

## 2018-09-18 ENCOUNTER — TELEPHONE (OUTPATIENT)
Dept: FAMILY MEDICINE CLINIC | Age: 63
End: 2018-09-18

## 2018-09-18 ENCOUNTER — HOSPITAL ENCOUNTER (OUTPATIENT)
Dept: LAB | Age: 63
Discharge: HOME | End: 2018-09-18
Payer: MEDICARE

## 2018-09-18 VITALS
TEMPERATURE: 98.3 F | SYSTOLIC BLOOD PRESSURE: 110 MMHG | BODY MASS INDEX: 33.49 KG/M2 | HEART RATE: 71 BPM | HEIGHT: 63 IN | RESPIRATION RATE: 20 BRPM | WEIGHT: 189 LBS | OXYGEN SATURATION: 97 % | DIASTOLIC BLOOD PRESSURE: 70 MMHG

## 2018-09-18 DIAGNOSIS — K21.9 GASTROESOPHAGEAL REFLUX DISEASE WITHOUT ESOPHAGITIS: ICD-10-CM

## 2018-09-18 DIAGNOSIS — I10 ESSENTIAL HYPERTENSION: Primary | ICD-10-CM

## 2018-09-18 DIAGNOSIS — R10.9 FLANK PAIN: ICD-10-CM

## 2018-09-18 DIAGNOSIS — J45.21 RAD (REACTIVE AIRWAY DISEASE), MILD INTERMITTENT, WITH ACUTE EXACERBATION: ICD-10-CM

## 2018-09-18 DIAGNOSIS — E78.00 HYPERCHOLESTEROLEMIA: ICD-10-CM

## 2018-09-18 LAB
APPEARANCE UR: CLEAR
BACTERIA URNS QL MICRO: ABNORMAL /HPF
BILIRUB UR QL: NEGATIVE
COLOR UR: ABNORMAL
EPITH CASTS URNS QL MICRO: ABNORMAL /LPF (ref 0–5)
GLUCOSE UR STRIP.AUTO-MCNC: NEGATIVE MG/DL
HGB UR QL STRIP: NEGATIVE
KETONES UR QL STRIP.AUTO: NEGATIVE MG/DL
LEUKOCYTE ESTERASE UR QL STRIP.AUTO: ABNORMAL
NITRITE UR QL STRIP.AUTO: NEGATIVE
PH UR STRIP: 5 [PH] (ref 5–8)
PROT UR STRIP-MCNC: NEGATIVE MG/DL
RBC #/AREA URNS HPF: 0 /HPF (ref 0–5)
SP GR UR REFRACTOMETRY: 1.02 (ref 1–1.03)
UROBILINOGEN UR QL STRIP.AUTO: 0.2 EU/DL (ref 0.2–1)
WBC URNS QL MICRO: ABNORMAL /HPF (ref 0–4)

## 2018-09-18 PROCEDURE — 81001 URINALYSIS AUTO W/SCOPE: CPT | Performed by: INTERNAL MEDICINE

## 2018-09-18 PROCEDURE — 87086 URINE CULTURE/COLONY COUNT: CPT | Performed by: INTERNAL MEDICINE

## 2018-09-18 RX ORDER — LEVALBUTEROL TARTRATE 45 UG/1
2 AEROSOL, METERED ORAL
Qty: 2 INHALER | Refills: 2 | Status: SHIPPED | OUTPATIENT
Start: 2018-09-18 | End: 2019-03-08

## 2018-09-18 RX ORDER — LOSARTAN POTASSIUM 25 MG/1
TABLET ORAL DAILY
COMMUNITY
End: 2019-05-01

## 2018-09-18 NOTE — MR AVS SNAPSHOT
[address] [place] [address]  [address] [facility] [address] [phone] Patient: Lloyd Prater MRN: [id number] SKF:[birth year] Visit Information Date & Time Provider Department Dept. Phone Encounter #  
 9/18/2018  7:45 AM Ana Herman, [address] [employer] [phone] [id number] Upcoming Health Maintenance Date Due Influenza Age 5 to Adult 8/1/2018 MEDICARE YEARLY EXAM 2/15/2019 BREAST CANCER SCRN MAMMOGRAM 3/8/2019 DTaP/Tdap/Td series (2 - Td) 2/24/2025 COLONOSCOPY 5/31/2026 Allergies as of 9/18/2018  Review Complete On: 9/18/2018 By: Ana Herman MD  
  
 Severity Noted Reaction Type Reactions Asa-acetaminophen-caff-potass  04/08/2010    Other (comments) GI upset Bextra [Valdecoxib]  04/08/2010    Other (comments) GI upset Celebrex [Celecoxib]  04/08/2010    Other (comments) GI upset Naproxen  11/11/2014    Rash Pcn [Penicillins]  04/08/2010    Swelling Sulfur  04/08/2010    Rash Current Immunizations  Reviewed on 9/18/2018 Name Date Influenza Vaccine 10/9/2015, 10/15/2014, 10/30/2013 Influenza Vaccine (Quad) PF 9/21/2017  8:07 AM, 10/25/2016  3:39 PM  
 Influenza Vaccine Split 10/11/2012 Influenza Vaccine Whole 11/24/2010 Pneumococcal Polysaccharide (PPSV-23) 11/16/2016 Tdap 2/24/2015 Zoster Vaccine, Live 9/23/2017 Reviewed by Ana Herman MD on 9/18/2018 at  7:57 AM  
You Were Diagnosed With   
  
 Codes Comments Essential hypertension    -  Primary ICD-10-CM: I10 
ICD-9-CM: 401.9 Hypercholesterolemia     ICD-10-CM: E78.00 ICD-9-CM: 272.0 Wheezing     ICD-10-CM: R06.2 ICD-9-CM: 786.07   
 RAD (reactive airway disease), mild intermittent, with acute exacerbation     ICD-10-CM: J45.21 ICD-9-CM: 657.88 Gastroesophageal reflux disease without esophagitis     ICD-10-CM: K21.9 ICD-9-CM: 530.81 Flank pain     ICD-10-CM: R10.9 ICD-9-CM: 789.09 Vitals BP Pulse Temp Resp Height(growth percentile) Weight(growth percentile) 110/70 (BP 1 Location: Left arm, BP Patient Position: Sitting) 71 98.3 °F (36.8 °C) (Oral) 20 5' 3\" (1.6 m) 189 lb (85.7 kg) SpO2 BMI OB Status Smoking Status 97% 33.48 kg/m2 Hysterectomy Former Smoker Vitals History BMI and BSA Data Body Mass Index Body Surface Area  
 33.48 kg/m 2 1.95 m 2 Preferred Pharmacy Pharmacy Name Phone [address] [phone] Your Updated Medication List  
  
   
This list is accurate as of 9/18/18  7:58 AM.  Always use your most recent med list.  
  
  
  
  
 ammonium lactate 12 % topical cream  
Commonly known as:  AMLACTIN  
APPLY CREAM TOPICALLY TO AFFECTED AREA TWICE DAILY. (RUB IN AFFECTED AREA WELL) aspirin delayed-release 81 mg tablet Take 81 mg by mouth daily. BIOTIN PO Take  by mouth daily. CALCIUM 600 WITH VITAMIN D3 600 mg(1,500mg) -400 unit Chew Generic drug:  Calcium-Cholecalciferol (D3) Take 1,200 mg by mouth every other day. carvedilol 25 mg tablet Commonly known as:  COREG  
TAKE ONE TABLET BY MOUTH IN THE MORNING AND ONE-HALF TAB IN THE EVENING WITH  MEALS  
  
 CLARITIN 10 mg tablet Generic drug:  loratadine Take 10 mg by mouth. CRESTOR 10 mg tablet Generic drug:  rosuvastatin  
half a tablet at night  Indications: hyperlipidemia FISH OIL PO Take  by mouth daily. 4 tabs daily. fluticasone 50 mcg/actuation nasal spray Commonly known as:  Montine Blair 2 Sprays by Both Nostrils route daily. furosemide 20 mg tablet Commonly known as:  LASIX TAKE ONE TABLET BY MOUTH ONCE DAILY  
  
 KLOR-CON M20 20 mEq tablet Generic drug:  potassium chloride TAKE ONE TABLET BY MOUTH ONCE DAILY  
  
 levalbuterol tartrate 45 mcg/actuation inhaler Commonly known as:  Regine Giordano Take 2 Puffs by inhalation every six (6) hours as needed for Wheezing. losartan 25 mg tablet Commonly known as:  COZAAR Take  by mouth daily. meloxicam 15 mg tablet Commonly known as:  MOBIC  
TAKE ONE TABLET BY MOUTH ONCE DAILY  
  
 multivitamin tablet Commonly known as:  ONE A DAY Take 1 Tab by mouth daily. nitroglycerin 0.4 mg SL tablet Commonly known as:  NITROSTAT  
DISSOLVE ONE TABLET UNDER THE TONGUE EVERY 5 MINUTES AS NEEDED FOR CHEST PAIN. DO NOT EXCEED A TOTAL OF 3 DOSES IN 15 MINUTES  
  
 PROTONIX 40 mg tablet Generic drug:  pantoprazole Take 1 Tab by mouth daily. raNITIdine 150 mg tablet Commonly known as:  ZANTAC TAKE ONE TABLET BY MOUTH ONCE DAILY IN THE EVENING  
  
 RESTASIS 0.05 % ophthalmic emulsion Generic drug:  cycloSPORINE Administer 1 Drop to both eyes two (2) times a day. TOVIAZ 8 mg ER tablet Generic drug:  fesoterodine TAKE 1 TABLET BY MOUTH ONCE DAILY  
  
 VITAMIN D3 1,000 unit tablet Generic drug:  cholecalciferol Take 2,000 Units by mouth daily. VOLTAREN 1 % Gel Generic drug:  diclofenac APPLY 4G TO AFFECTED AREA FOUR TIMES DAILY Prescriptions Sent to Pharmacy Refills  
 levalbuterol tartrate (XOPENEX) 45 mcg/actuation inhaler 2 Sig: Take 2 Puffs by inhalation every six (6) hours as needed for Wheezing. Class: Normal  
 Pharmacy: [facility] DR VALARIE HAUSER NUPOLLO [address] [place] [address], [address] [place] [address] Ph #: [phone] Route: Inhalation To-Do List   
 09/18/2018 Microbiology:  CULTURE, URINE   
  
 09/18/2018 Lab:  URINALYSIS W/ RFLX MICROSCOPIC Patient Instructions High Cholesterol: Care Instructions Your Care Instructions Cholesterol is a type of fat in your blood. It is needed for many body functions, such as making new cells. Cholesterol is made by your body. It also comes from food you eat.  High cholesterol means that you have too much of the fat in your blood. This raises your risk of a heart attack and stroke. LDL and HDL are part of your total cholesterol. LDL is the \"bad\" cholesterol. High LDL can raise your risk for heart disease, heart attack, and stroke. HDL is the \"good\" cholesterol. It helps clear bad cholesterol from the body. High HDL is linked with a lower risk of heart disease, heart attack, and stroke. Your cholesterol levels help your doctor find out your risk for having a heart attack or stroke. You and your doctor can talk about whether you need to lower your risk and what treatment is best for you. A heart-healthy lifestyle along with medicines can help lower your cholesterol and your risk. The way you choose to lower your risk will depend on how high your risk is for heart attack and stroke. It will also depend on how you feel about taking medicines. Follow-up care is a key part of your treatment and safety. Be sure to make and go to all appointments, and call your doctor if you are having problems. It's also a good idea to know your test results and keep a list of the medicines you take. How can you care for yourself at home? · Eat a variety of foods every day. Good choices include fruits, vegetables, whole grains (like oatmeal), dried beans and peas, nuts and seeds, soy products (like tofu), and fat-free or low-fat dairy products. · Replace butter, margarine, and hydrogenated or partially hydrogenated oils with olive and canola oils. (Canola oil margarine without trans fat is fine.) · Replace red meat with fish, poultry, and soy protein (like tofu). · Limit processed and packaged foods like chips, crackers, and cookies. · Bake, broil, or steam foods. Don't segnudo them. · Be physically active. Get at least 30 minutes of exercise on most days of the week. Walking is a good choice. You also may want to do other activities, such as running, swimming, cycling, or playing tennis or team sports. · Stay at a healthy weight or lose weight by making the changes in eating and physical activity listed above. Losing just a small amount of weight, even 5 to 10 pounds, can reduce your risk for having a heart attack or stroke. · Do not smoke. When should you call for help? Watch closely for changes in your health, and be sure to contact your doctor if: 
  · You need help making lifestyle changes.  
  · You have questions about your medicine. Where can you learn more? Go to http://john-yasir.info/. Enter T737 in the search box to learn more about \"High Cholesterol: Care Instructions. \" Current as of: May 10, 2017 Content Version: 11.7 © 0601-4265 [employer]. Care instructions adapted under license by [employer] (which disclaims liability or warranty for this information). If you have questions about a medical condition or this instruction, always ask your healthcare professional. [place] [address] any warranty or liability for your use of this information. Introducing [facility] & HEALTH [facility]! Dear Florance Files: Thank you for requesting a Linktone account. Our records indicate that you already have an active Linktone account. You can access your account anytime at [email or web address] Did you know that you can access your hospital and ER discharge instructions at any time in Linktone? You can also review all of your test results from your hospital stay or ER visit. Additional Information If you have questions, please visit the Frequently Asked Questions section of the Linktone website at [email or web address] Remember, Linktone is NOT to be used for urgent needs. For medical emergencies, dial 911. Now available from your iPhone and Android! Please provide this summary of care documentation to your next provider. Your primary care clinician is listed as Isreal 51.  If you have any questions after today's visit, please call [phone].

## 2018-09-18 NOTE — PROGRESS NOTES
Assessment/Plan: *Diagnoses and all orders for this visit: 1. Essential hypertension 2. Hypercholesterolemia 3. RAD (reactive airway disease), mild intermittent, with acute exacerbation 
-     levalbuterol tartrate (XOPENEX) 45 mcg/actuation inhaler; Take 2 Puffs by inhalation every six (6) hours as needed for Wheezing. 4. Gastroesophageal reflux disease without esophagitis 5. Flank pain 
-     URINALYSIS W/ RFLX MICROSCOPIC; Future -     CULTURE, URINE; Future Will await UA results. F/u 3 months. Out of flu vaccines. Will return this week. Suggested she try getting a wedge pillow for the mattress and see if this helps her cough. Recommend she get the prevnar 13. The plan was discussed with the patient. The patient verbalized understanding and is in agreement with the plan. All medication potential side effects were discussed with the patient. 
 
------------------------------------------------------------------------------------------------------------------- Alana Harrison is a 59 y.o. female and presents with Hypertension Subjective: 
Pt here for f/u. HTN: well controlled. On meds, compliant. GERD: appears that she may be having some symptoms. On max therapy. Has noted a cough at night, when laying down. HLD: well controlled, so mych so that we were able to lower Crestor to 5 mg. RAD: allergies are acting up with the change in season. Arthritis has also been more bothersome. Takes her Mobic. Has noted some flank pain, wonders whether she has a UTI. 
 
 
 
ROS: 
Review of Systems - Negative except above symptoms noted. The problem list was updated as a part of today's visit. Patient Active Problem List  
Diagnosis Code  Arthritis M19.90  
 CAD (coronary artery disease) I25.10  GERD (gastroesophageal reflux disease) K21.9  Sarcoidosis D86.9  Ganglion cyst of wrist M67.439  Posterior vitreous detachment H43.819  
 Hypercholesterolemia E78.00  
 Urinary incontinence R32  Allergy, unspecified not elsewhere classified T78.40XA  CHF (congestive heart failure) ([facility]) I50.9  
 HTN (hypertension) I10  
 Atlanto-axial subluxation S13.120A  RAD (reactive airway disease) J45.909  Advance care planning Z71.89 The PS,  were reviewed. SH: Social History Substance Use Topics  Smoking status: Former Smoker Quit date: 8/1/1986  Smokeless tobacco: Former User  Alcohol use No  
 
 
Medications/Allergies: 
Current Outpatient Prescriptions on File Prior to Visit Medication Sig Dispense Refill  TOVIAZ 8 mg ER tablet TAKE 1 TABLET BY MOUTH ONCE DAILY 90 Tab 1  
 furosemide (LASIX) 20 mg tablet TAKE ONE TABLET BY MOUTH ONCE DAILY 90 Tab 1  
 PROTONIX 40 mg tablet Take 1 Tab by mouth daily. 90 Tab 0  
 meloxicam (MOBIC) 15 mg tablet TAKE ONE TABLET BY MOUTH ONCE DAILY 90 Tab 1  KLOR-CON M20 20 mEq tablet TAKE ONE TABLET BY MOUTH ONCE DAILY 90 Tab 1  
 Calcium-Cholecalciferol, D3, (CALCIUM 600 WITH VITAMIN D3) 600 mg(1,500mg) -400 unit chew Take 1,200 mg by mouth every other day.  CRESTOR 10 mg tablet half a tablet at night  Indications: hyperlipidemia 45 Tab 2  carvedilol (COREG) 25 mg tablet TAKE ONE TABLET BY MOUTH IN THE MORNING AND ONE-HALF TAB IN THE EVENING WITH  MEALS 135 Tab 2  
 raNITIdine (ZANTAC) 150 mg tablet TAKE ONE TABLET BY MOUTH ONCE DAILY IN THE EVENING 90 Tab 2  
 loratadine (CLARITIN) 10 mg tablet Take 10 mg by mouth.  fluticasone (FLONASE) 50 mcg/actuation nasal spray 2 Sprays by Both Nostrils route daily. 3 Bottle 3  
 nitroglycerin (NITROSTAT) 0.4 mg SL tablet DISSOLVE ONE TABLET UNDER THE TONGUE EVERY 5 MINUTES AS NEEDED FOR CHEST PAIN.   DO NOT EXCEED A TOTAL OF 3 DOSES IN 15 MINUTES 50 Tab 0  
 VOLTAREN 1 % gel APPLY 4G TO AFFECTED AREA FOUR TIMES DAILY 400 g 2  
  ammonium lactate (AMLACTIN) 12 % topical cream APPLY CREAM TOPICALLY TO AFFECTED AREA TWICE DAILY. (RUB IN AFFECTED AREA WELL) 385 g 2  
 BIOTIN PO Take  by mouth daily.  cholecalciferol, vitamin d3, (VITAMIN D) 1,000 unit tablet Take 2,000 Units by mouth daily.  aspirin delayed-release 81 mg tablet Take 81 mg by mouth daily.  multivitamin (ONE A DAY) tablet Take 1 Tab by mouth daily.  DOCOSAHEXANOIC ACID/EPA (FISH OIL PO) Take  by mouth daily. 4 tabs daily.  cyclosporin (RESTASIS) 0.05 % ophthalmic emulsion Administer 1 Drop to both eyes two (2) times a day. No current facility-administered medications on file prior to visit. Allergies Allergen Reactions  Asa-Acetaminophen-Caff-Potass Other (comments) GI upset  Bextra [Valdecoxib] Other (comments) GI upset  Celebrex [Celecoxib] Other (comments) GI upset  Naproxen Rash  Pcn [Penicillins] Swelling  Sulfur Rash Health Maintenance:  
Health Maintenance Topic Date Due  Influenza Age 5 to Adult  08/01/2018  MEDICARE YEARLY EXAM  02/15/2019  BREAST CANCER SCRN MAMMOGRAM  03/08/2019  
 DTaP/Tdap/Td series (2 - Td) 02/24/2025  COLONOSCOPY  05/31/2026  Hepatitis C Screening  Completed  ZOSTER VACCINE AGE 60>  Addressed  Pneumococcal 19-64 Medium Risk  Completed Objective: 
Visit Vitals  /70 (BP 1 Location: Left arm, BP Patient Position: Sitting)  Pulse 71  Temp 98.3 °F (36.8 °C) (Oral)  Resp 20  
 Ht 5' 3\" (1.6 m)  Wt 189 lb (85.7 kg)  SpO2 97%  BMI 33.48 kg/m2 Nurses notes and VS reviewed. Physical Examination: General appearance - alert, well appearing, and in no distress Chest - clear to auscultation, no wheezes, rales or rhonchi, symmetric air entry Heart - normal rate, regular rhythm, normal S1, S2, no murmurs, rubs, clicks or gallops Abdomen - soft, nontender, nondistended, no masses or organomegaly Labwork and Ancillary Studies: CBC w/Diff Lab Results Component Value Date/Time WBC 2.9 (L) 05/02/2018 07:30 AM  
 HGB 12.8 05/02/2018 07:30 AM  
 PLATELET 898 87/94/2197 07:30 AM  
  
 
 Basic Metabolic Profile Lab Results Component Value Date/Time Sodium 140 05/02/2018 07:30 AM  
 Potassium 4.9 05/02/2018 07:30 AM  
 Chloride 104 05/02/2018 07:30 AM  
 CO2 31 05/02/2018 07:30 AM  
 Anion gap 5 05/02/2018 07:30 AM  
 Glucose 83 05/02/2018 07:30 AM  
 BUN 13 05/02/2018 07:30 AM  
 Creatinine 1.10 05/02/2018 07:30 AM  
 BUN/Creatinine ratio 12 05/02/2018 07:30 AM  
 GFR est AA >60 05/02/2018 07:30 AM  
 GFR est non-AA 50 (L) 05/02/2018 07:30 AM  
 Calcium 8.7 05/02/2018 07:30 AM  
  
  
LFT Lab Results Component Value Date/Time ALT (SGPT) 38 05/02/2018 07:30 AM  
 AST (SGOT) 32 05/02/2018 07:30 AM  
 Alk. phosphatase 74 05/02/2018 07:30 AM  
 Bilirubin, direct 0.2 05/02/2018 07:30 AM  
 Bilirubin, total 0.8 05/02/2018 07:30 AM  
 
 
 
Cholesterol Lab Results Component Value Date/Time  Cholesterol, total 122 05/02/2018 07:30 AM  
 HDL Cholesterol 52 05/02/2018 07:30 AM  
 LDL, calculated 61.6 05/02/2018 07:30 AM  
 Triglyceride 42 05/02/2018 07:30 AM  
 CHOL/HDL Ratio 2.3 05/02/2018 07:30 AM

## 2018-09-18 NOTE — PATIENT INSTRUCTIONS

## 2018-09-18 NOTE — PROGRESS NOTES
Alana Harrison is a 59 y.o. female (: 1955) presenting to address: Chief Complaint Patient presents with  Hypertension Vitals:  
 18 0725 BP: 110/70 Pulse: 71 Resp: 20 Temp: 98.3 °F (36.8 °C) TempSrc: Oral  
SpO2: 97% Weight: 189 lb (85.7 kg) Height: 5' 3\" (1.6 m) PainSc:   6 PainLoc: Ankle Hearing/Vision: No exam data present Learning Assessment:  
 
Learning Assessment 2015 PRIMARY LEARNER Patient HIGHEST LEVEL OF EDUCATION - PRIMARY LEARNER  2 YEARS OF COLLEGE  
BARRIERS PRIMARY LEARNER NONE  
CO-LEARNER CAREGIVER No  
PRIMARY LANGUAGE ENGLISH  
LEARNER PREFERENCE PRIMARY READING  
ANSWERED BY self RELATIONSHIP SELF Depression Screening: PHQ over the last two weeks 2018 Little interest or pleasure in doing things Not at all Feeling down, depressed, irritable, or hopeless Not at all Total Score PHQ 2 0 Fall Risk Assessment:  
 
Fall Risk Assessment, last 12 mths 2018 Able to walk? Yes Fall in past 12 months? Yes Fall with injury? No  
Number of falls in past 12 months 2 Fall Risk Score 2 Abuse Screening:  
 
Abuse Screening Questionnaire 2018 Do you ever feel afraid of your partner? Scott Jacob Are you in a relationship with someone who physically or mentally threatens you? Scott Jacob Is it safe for you to go home? Rayna Aleman Coordination of Care Questionaire: 1. Have you been to the ER, urgent care clinic since your last visit? Hospitalized since your last visit? No  
 
2. Have you seen or consulted any other health care providers outside of the [address] [place] [address] since your last visit? Include any pap smears or colon screening. No  
Advanced Directive: 1. Do you have an Advanced Directive? No  
 
2. Would you like information on Advanced Directives?  no 
 
 
Health Maintenance Due Topic Date Due  Influenza Age 5 to Adult  2018

## 2018-09-20 ENCOUNTER — DOCUMENTATION ONLY (OUTPATIENT)
Dept: FAMILY MEDICINE CLINIC | Age: 63
End: 2018-09-20

## 2018-09-20 LAB
BACTERIA SPEC CULT: ABNORMAL
BACTERIA SPEC CULT: ABNORMAL
SERVICE CMNT-IMP: ABNORMAL

## 2018-09-20 NOTE — PROGRESS NOTES
Patients Levalbuterol approved patient unable to tolerate albuterol which she tried in 2009 and has been on this medication since 2013 approved from 9/19/2018 to 12/31/2018 for 30 day per 50 days copy faxed to pharmacy

## 2018-10-29 ENCOUNTER — TELEPHONE (OUTPATIENT)
Dept: FAMILY MEDICINE CLINIC | Age: 63
End: 2018-10-29

## 2018-10-29 NOTE — TELEPHONE ENCOUNTER
Patient called and left a voicemail to request refill for Crestor and was inquiring about which vaccination she was due for. I called her back and let her know that she is currently due for Shingrix and that she could get it at [facility] because we are currently out and not sure when we will get more in, and that her Crestor refill was sent to [place] [id number] this morning. Patient verbalized understanding.

## 2018-11-09 DIAGNOSIS — I10 ESSENTIAL HYPERTENSION: ICD-10-CM

## 2018-11-12 RX ORDER — CARVEDILOL 25 MG/1
TABLET ORAL
Qty: 135 TAB | Refills: 2 | Status: SHIPPED | OUTPATIENT
Start: 2018-11-12 | End: 2019-08-02 | Stop reason: SDUPTHER

## 2018-12-18 ENCOUNTER — OFFICE VISIT (OUTPATIENT)
Dept: FAMILY MEDICINE CLINIC | Age: 63
End: 2018-12-18

## 2018-12-18 VITALS
BODY MASS INDEX: 33.2 KG/M2 | SYSTOLIC BLOOD PRESSURE: 110 MMHG | OXYGEN SATURATION: 97 % | HEART RATE: 67 BPM | DIASTOLIC BLOOD PRESSURE: 62 MMHG | HEIGHT: 63 IN | WEIGHT: 187.4 LBS | TEMPERATURE: 97.7 F | RESPIRATION RATE: 16 BRPM

## 2018-12-18 DIAGNOSIS — E66.9 OBESITY (BMI 30-39.9): ICD-10-CM

## 2018-12-18 DIAGNOSIS — E78.00 HYPERCHOLESTEROLEMIA: ICD-10-CM

## 2018-12-18 DIAGNOSIS — I10 ESSENTIAL HYPERTENSION: Primary | ICD-10-CM

## 2018-12-18 DIAGNOSIS — E55.9 HYPOVITAMINOSIS D: ICD-10-CM

## 2018-12-18 NOTE — PROGRESS NOTES
Ileana Diaz is a 59 y.o. female (: 1955) presenting to address:  Patient has already received the flu vaccine. Chief Complaint   Patient presents with    Hypertension       Vitals:    18 0739   BP: 110/62   Pulse: 67   Resp: 16   Temp: 97.7 °F (36.5 °C)   TempSrc: Oral   SpO2: 97%   Weight: 187 lb 6.4 oz (85 kg)   Height: 5' 3\" (1.6 m)   PainSc:   5   PainLoc: Ankle       Hearing/Vision:   No exam data present    Learning Assessment:     Learning Assessment 2015   PRIMARY LEARNER Patient   HIGHEST LEVEL OF EDUCATION - PRIMARY LEARNER  2 YEARS OF COLLEGE   BARRIERS PRIMARY LEARNER NONE   CO-LEARNER CAREGIVER No   PRIMARY LANGUAGE ENGLISH   LEARNER PREFERENCE PRIMARY READING   ANSWERED BY self   RELATIONSHIP SELF     Depression Screening:     PHQ over the last two weeks 2018   Little interest or pleasure in doing things Not at all   Feeling down, depressed, irritable, or hopeless Not at all   Total Score PHQ 2 0     Fall Risk Assessment:     Fall Risk Assessment, last 12 mths 2018   Able to walk? Yes   Fall in past 12 months? Yes   Fall with injury? No   Number of falls in past 12 months 2   Fall Risk Score 2     Abuse Screening:     Abuse Screening Questionnaire 2018   Do you ever feel afraid of your partner? N   Are you in a relationship with someone who physically or mentally threatens you? N   Is it safe for you to go home? Y     Coordination of Care Questionaire:   1. Have you been to the ER, urgent care clinic since your last visit? Hospitalized since your last visit? NO    2. Have you seen or consulted any other health care providers outside of the [address] [place] [address] since your last visit? Include any pap smears or colon screening. YES Dr. Brisa Oneill Rheumatologist; Cardioology    Advanced Directive:   1. Do you have an Advanced Directive? NO    2. Would you like information on Advanced Directives?  NO

## 2018-12-18 NOTE — PATIENT INSTRUCTIONS
High Blood Pressure: Care Instructions  Your Care Instructions    If your blood pressure is usually above 130/80, you have high blood pressure, or hypertension. That means the top number is 130 or higher or the bottom number is 80 or higher, or both. Despite what a lot of people think, high blood pressure usually doesn't cause headaches or make you feel dizzy or lightheaded. It usually has no symptoms. But it does increase your risk for heart attack, stroke, and kidney or eye damage. The higher your blood pressure, the more your risk increases. Your doctor will give you a goal for your blood pressure. Your goal will be based on your health and your age. Lifestyle changes, such as eating healthy and being active, are always important to help lower blood pressure. You might also take medicine to reach your blood pressure goal.  Follow-up care is a key part of your treatment and safety. Be sure to make and go to all appointments, and call your doctor if you are having problems. It's also a good idea to know your test results and keep a list of the medicines you take. How can you care for yourself at home? Medical treatment  · If you stop taking your medicine, your blood pressure will go back up. You may take one or more types of medicine to lower your blood pressure. Be safe with medicines. Take your medicine exactly as prescribed. Call your doctor if you think you are having a problem with your medicine. · Talk to your doctor before you start taking aspirin every day. Aspirin can help certain people lower their risk of a heart attack or stroke. But taking aspirin isn't right for everyone, because it can cause serious bleeding. · See your doctor regularly. You may need to see the doctor more often at first or until your blood pressure comes down. · If you are taking blood pressure medicine, talk to your doctor before you take decongestants or anti-inflammatory medicine, such as ibuprofen.  Some of these medicines can raise blood pressure. · Learn how to check your blood pressure at home. Lifestyle changes  · Stay at a healthy weight. This is especially important if you put on weight around the waist. Losing even 10 pounds can help you lower your blood pressure. · If your doctor recommends it, get more exercise. Walking is a good choice. Bit by bit, increase the amount you walk every day. Try for at least 30 minutes on most days of the week. You also may want to swim, bike, or do other activities. · Avoid or limit alcohol. Talk to your doctor about whether you can drink any alcohol. · Try to limit how much sodium you eat to less than 2,300 milligrams (mg) a day. Your doctor may ask you to try to eat less than 1,500 mg a day. · Eat plenty of fruits (such as bananas and oranges), vegetables, legumes, whole grains, and low-fat dairy products. · Lower the amount of saturated fat in your diet. Saturated fat is found in animal products such as milk, cheese, and meat. Limiting these foods may help you lose weight and also lower your risk for heart disease. · Do not smoke. Smoking increases your risk for heart attack and stroke. If you need help quitting, talk to your doctor about stop-smoking programs and medicines. These can increase your chances of quitting for good. When should you call for help? Call 911 anytime you think you may need emergency care. This may mean having symptoms that suggest that your blood pressure is causing a serious heart or blood vessel problem. Your blood pressure may be over 180/120.   For example, call 911 if:    · You have symptoms of a heart attack. These may include:  ? Chest pain or pressure, or a strange feeling in the chest.  ? Sweating. ? Shortness of breath. ? Nausea or vomiting. ? Pain, pressure, or a strange feeling in the back, neck, jaw, or upper belly or in one or both shoulders or arms. ? Lightheadedness or sudden weakness.   ? A fast or irregular heartbeat.     · You have symptoms of a stroke. These may include:  ? Sudden numbness, tingling, weakness, or loss of movement in your face, arm, or leg, especially on only one side of your body. ? Sudden vision changes. ? Sudden trouble speaking. ? Sudden confusion or trouble understanding simple statements. ? Sudden problems with walking or balance. ? A sudden, severe headache that is different from past headaches.     · You have severe back or belly pain.    Do not wait until your blood pressure comes down on its own. Get help right away.   Call your doctor now or seek immediate care if:    · Your blood pressure is much higher than normal (such as 180/120 or higher), but you don't have symptoms.     · You think high blood pressure is causing symptoms, such as:  ? Severe headache.  ? Blurry vision.    Watch closely for changes in your health, and be sure to contact your doctor if:    · Your blood pressure measures higher than your doctor recommends at least 2 times. That means the top number is higher or the bottom number is higher, or both.     · You think you may be having side effects from your blood pressure medicine. Where can you learn more? Go to http://john-yasir.info/. Enter F890 in the search box to learn more about \"High Blood Pressure: Care Instructions. \"  Current as of: December 6, 2017  Content Version: 11.8  © 7641-7491 [employer]. Care instructions adapted under license by [employer] (which disclaims liability or warranty for this information). If you have questions about a medical condition or this instruction, always ask your healthcare professional. [place] [address] any warranty or liability for your use of this information.

## 2018-12-18 NOTE — PROGRESS NOTES
Assessment/Plan:    *Diagnoses and all orders for this visit:    1. Essential hypertension    2. Hypercholesterolemia  -     CBC WITH AUTOMATED DIFF; Future  -     HEPATIC FUNCTION PANEL; Future  -     LIPID PANEL; Future  -     METABOLIC PANEL, BASIC; Future  -     TSH 3RD GENERATION; Future  -     T4, FREE; Future  -     URINALYSIS W/ RFLX MICROSCOPIC; Future    3. Obesity (BMI 30-39.9)    4. Hypovitaminosis D  -     VITAMIN D, 25 HYDROXY; Future        Physical in 3 months. Labs prior. The plan was discussed with the patient. The patient verbalized understanding and is in agreement with the plan. All medication potential side effects were discussed with the patient.    -------------------------------------------------------------------------------------------------------------------        Kevin Wood is a 59 y.o. female and presents with Hypertension         Subjective:  Pt here for f/u. HTN: well controlled. HLD: stable. She is monitoring her weight, trying to keep it stable. She would do better with her arthritic pain if she could lose some more weight. ROS:  Review of Systems - Negative         The problem list was updated as a part of today's visit. Patient Active Problem List   Diagnosis Code    Arthritis M19.90    CAD (coronary artery disease) I25.10    GERD (gastroesophageal reflux disease) K21.9    Sarcoidosis D86.9    Ganglion cyst of wrist M67.439    Posterior vitreous detachment H43.819    Hypercholesterolemia E78.00    Urinary incontinence R32    Allergy, unspecified not elsewhere classified T78.40XA    CHF (congestive heart failure) (HCC) I50.9    HTN (hypertension) I10    Atlanto-axial subluxation S13.120A    RAD (reactive airway disease) J45.909    Advance care planning Z71.89       The PSH, FH were reviewed.         SH:  Social History     Tobacco Use    Smoking status: Former Smoker     Last attempt to quit: 1986     Years since quittin.4    Smokeless tobacco: Former User   Substance Use Topics    Alcohol use: No    Drug use: No       Medications/Allergies:  Current Outpatient Medications on File Prior to Visit   Medication Sig Dispense Refill    carvedilol (COREG) 25 mg tablet TAKE 1 TABLET BY MOUTH IN THE MORNING AND 1/2 (ONE-HALF) IN THE EVENING WITH MEALS 135 Tab 2    CRESTOR 10 mg tablet TAKE 1/2 (ONE-HALF) TABLET BY MOUTH ONCE DAILY AT NIGHT 45 Tab 0    raNITIdine (ZANTAC) 150 mg tablet TAKE ONE TABLET BY MOUTH IN THE EVENING 90 Tab 1    losartan (COZAAR) 25 mg tablet Take  by mouth daily.  levalbuterol tartrate (XOPENEX) 45 mcg/actuation inhaler Take 2 Puffs by inhalation every six (6) hours as needed for Wheezing. 2 Inhaler 2    TOVIAZ 8 mg ER tablet TAKE 1 TABLET BY MOUTH ONCE DAILY 90 Tab 1    furosemide (LASIX) 20 mg tablet TAKE ONE TABLET BY MOUTH ONCE DAILY 90 Tab 1    PROTONIX 40 mg tablet Take 1 Tab by mouth daily. 90 Tab 0    meloxicam (MOBIC) 15 mg tablet TAKE ONE TABLET BY MOUTH ONCE DAILY 90 Tab 1    KLOR-CON M20 20 mEq tablet TAKE ONE TABLET BY MOUTH ONCE DAILY 90 Tab 1    Calcium-Cholecalciferol, D3, (CALCIUM 600 WITH VITAMIN D3) 600 mg(1,500mg) -400 unit chew Take 1,200 mg by mouth every other day.  loratadine (CLARITIN) 10 mg tablet Take 10 mg by mouth.  fluticasone (FLONASE) 50 mcg/actuation nasal spray 2 Sprays by Both Nostrils route daily. 3 Bottle 3    nitroglycerin (NITROSTAT) 0.4 mg SL tablet DISSOLVE ONE TABLET UNDER THE TONGUE EVERY 5 MINUTES AS NEEDED FOR CHEST PAIN. DO NOT EXCEED A TOTAL OF 3 DOSES IN 15 MINUTES 50 Tab 0    VOLTAREN 1 % gel APPLY 4G TO AFFECTED AREA FOUR TIMES DAILY 400 g 2    ammonium lactate (AMLACTIN) 12 % topical cream APPLY CREAM TOPICALLY TO AFFECTED AREA TWICE DAILY. (RUB IN AFFECTED AREA WELL) 385 g 2    BIOTIN PO Take  by mouth daily.  cholecalciferol, vitamin d3, (VITAMIN D) 1,000 unit tablet Take 2,000 Units by mouth daily.       aspirin delayed-release 81 mg tablet Take 81 mg by mouth daily.  multivitamin (ONE A DAY) tablet Take 1 Tab by mouth daily.  DOCOSAHEXANOIC ACID/EPA (FISH OIL PO) Take  by mouth daily. 4 tabs daily.  cyclosporin (RESTASIS) 0.05 % ophthalmic emulsion Administer 1 Drop to both eyes two (2) times a day. No current facility-administered medications on file prior to visit. Allergies   Allergen Reactions    Asa-Acetaminophen-Caff-Potass Other (comments)     GI upset    Bextra [Valdecoxib] Other (comments)     GI upset    Celebrex [Celecoxib] Other (comments)     GI upset    Naproxen Rash    Pcn [Penicillins] Swelling    Sulfur Rash         Health Maintenance:   Health Maintenance   Topic Date Due    Shingrix Vaccine Age 49> (1 of 2) 04/12/2006    BREAST CANCER SCRN MAMMOGRAM  03/08/2019    MEDICARE YEARLY EXAM  02/15/2019    DTaP/Tdap/Td series (2 - Td) 02/24/2025    COLONOSCOPY  05/31/2026    Hepatitis C Screening  Completed    Pneumococcal 19-64 Medium Risk  Completed    Influenza Age 5 to Adult  Completed       Objective:  Visit Vitals  /62 (BP 1 Location: Left arm, BP Patient Position: Sitting)   Pulse 67   Temp 97.7 °F (36.5 °C) (Oral)   Resp 16   Ht 5' 3\" (1.6 m)   Wt 187 lb 6.4 oz (85 kg)   SpO2 97%   BMI 33.20 kg/m²          Nurses notes and VS reviewed.       Physical Examination: General appearance - alert, well appearing, and in no distress  Chest - clear to auscultation, no wheezes, rales or rhonchi, symmetric air entry  Heart - normal rate, regular rhythm, normal S1, S2, no murmurs, rubs, clicks or gallops        Labwork and Ancillary Studies:    CBC w/Diff  Lab Results   Component Value Date/Time    WBC 2.9 (L) 05/02/2018 07:30 AM    HGB 12.8 05/02/2018 07:30 AM    PLATELET 677 14/85/8372 07:30 AM         Basic Metabolic Profile  Lab Results   Component Value Date/Time    Sodium 140 05/02/2018 07:30 AM    Potassium 4.9 05/02/2018 07:30 AM    Chloride 104 05/02/2018 07:30 AM    CO2 31 05/02/2018 07:30 AM    Anion gap 5 05/02/2018 07:30 AM    Glucose 83 05/02/2018 07:30 AM    BUN 13 05/02/2018 07:30 AM    Creatinine 1.10 05/02/2018 07:30 AM    BUN/Creatinine ratio 12 05/02/2018 07:30 AM    GFR est AA >60 05/02/2018 07:30 AM    GFR est non-AA 50 (L) 05/02/2018 07:30 AM    Calcium 8.7 05/02/2018 07:30 AM         LFT  Lab Results   Component Value Date/Time    ALT (SGPT) 38 05/02/2018 07:30 AM    AST (SGOT) 32 05/02/2018 07:30 AM    Alk.  phosphatase 74 05/02/2018 07:30 AM    Bilirubin, direct 0.2 05/02/2018 07:30 AM    Bilirubin, total 0.8 05/02/2018 07:30 AM         Cholesterol  Lab Results   Component Value Date/Time    Cholesterol, total 122 05/02/2018 07:30 AM    HDL Cholesterol 52 05/02/2018 07:30 AM    LDL, calculated 61.6 05/02/2018 07:30 AM    Triglyceride 42 05/02/2018 07:30 AM    CHOL/HDL Ratio 2.3 05/02/2018 07:30 AM

## 2018-12-31 RX ORDER — MELOXICAM 15 MG/1
TABLET ORAL
Qty: 90 TAB | Refills: 1 | Status: SHIPPED | OUTPATIENT
Start: 2018-12-31 | End: 2020-09-23

## 2019-01-04 NOTE — TELEPHONE ENCOUNTER
Patient called to request a new prior authorizations for Toviaz and Protonix . Also needs a refill on Protonix sent to the pharmacy. Last written 7/12/2018 for 90 tabs 0 refills last OV 12/18/2018 . Next OV 3.19.19.

## 2019-01-06 DIAGNOSIS — N32.81 OAB (OVERACTIVE BLADDER): ICD-10-CM

## 2019-01-06 RX ORDER — FESOTERODINE FUMARATE 8 MG/1
TABLET, EXTENDED RELEASE ORAL
Qty: 90 TAB | Refills: 1 | Status: SHIPPED | OUTPATIENT
Start: 2019-01-06 | End: 2019-07-23 | Stop reason: SDUPTHER

## 2019-01-06 RX ORDER — PANTOPRAZOLE SODIUM 40 MG
40 TABLET, DELAYED RELEASE (ENTERIC COATED) ORAL DAILY
Qty: 90 TAB | Refills: 0 | Status: SHIPPED | OUTPATIENT
Start: 2019-01-06 | End: 2019-04-08 | Stop reason: SDUPTHER

## 2019-01-08 ENCOUNTER — DOCUMENTATION ONLY (OUTPATIENT)
Dept: FAMILY MEDICINE CLINIC | Age: 64
End: 2019-01-08

## 2019-01-08 NOTE — PROGRESS NOTES
Received authorization for Protonix 40 mg  And Toviaz 8 mg good from 1/7/19 to 1/7/2020 patient has been notified and copy's faxed to pharmacy.

## 2019-01-24 DIAGNOSIS — E78.00 HYPERCHOLESTEROLEMIA: ICD-10-CM

## 2019-01-24 RX ORDER — ROSUVASTATIN CALCIUM 10 MG/1
TABLET, FILM COATED ORAL
Qty: 45 TAB | Refills: 2 | Status: SHIPPED | OUTPATIENT
Start: 2019-01-24 | End: 2019-09-30 | Stop reason: SDUPTHER

## 2019-01-24 NOTE — TELEPHONE ENCOUNTER
Patient called to request refill on Toviaz which was just refilled on 1/6/2019 90 with 1 .  and crestor last refilled 10/29/2018 for 45 tablets 0 refills last OV 12/18/2018 . Next Ov 3/19/2019.

## 2019-02-05 DIAGNOSIS — I10 ESSENTIAL HYPERTENSION: ICD-10-CM

## 2019-02-05 RX ORDER — FUROSEMIDE 20 MG/1
TABLET ORAL
Qty: 90 TAB | Refills: 1 | Status: SHIPPED | OUTPATIENT
Start: 2019-02-05 | End: 2020-01-14

## 2019-02-05 RX ORDER — POTASSIUM CHLORIDE 1500 MG/1
TABLET, EXTENDED RELEASE ORAL
Qty: 90 TAB | Refills: 1 | Status: SHIPPED | OUTPATIENT
Start: 2019-02-05 | End: 2019-11-26 | Stop reason: SDUPTHER

## 2019-02-05 NOTE — TELEPHONE ENCOUNTER
Patient called requesting refills for the following medications:  Potassium which was last ordered 06/04/2018 with a quantity of 90 tablets and 1 refill.

## 2019-02-13 ENCOUNTER — TELEPHONE (OUTPATIENT)
Dept: FAMILY MEDICINE CLINIC | Age: 64
End: 2019-02-13

## 2019-02-13 NOTE — TELEPHONE ENCOUNTER
Called patient to let her know that the Authorization for her Crestor has been approved through her insurance for 02/13/2019-02/13/2020. Patient verbalized understanding.

## 2019-03-08 ENCOUNTER — OFFICE VISIT (OUTPATIENT)
Dept: FAMILY MEDICINE CLINIC | Age: 64
End: 2019-03-08

## 2019-03-08 VITALS
HEIGHT: 63 IN | TEMPERATURE: 98.3 F | DIASTOLIC BLOOD PRESSURE: 70 MMHG | RESPIRATION RATE: 16 BRPM | SYSTOLIC BLOOD PRESSURE: 108 MMHG | WEIGHT: 192 LBS | HEART RATE: 79 BPM | BODY MASS INDEX: 34.02 KG/M2 | OXYGEN SATURATION: 97 %

## 2019-03-08 DIAGNOSIS — J01.10 ACUTE FRONTAL SINUSITIS, RECURRENCE NOT SPECIFIED: Primary | ICD-10-CM

## 2019-03-08 RX ORDER — AZITHROMYCIN 250 MG/1
TABLET, FILM COATED ORAL
Qty: 6 TAB | Refills: 0 | Status: SHIPPED | OUTPATIENT
Start: 2019-03-08 | End: 2019-03-13

## 2019-03-08 NOTE — PROGRESS NOTES
HISTORY OF PRESENT ILLNESS  Lori Childers is a 59 y.o. female. Cold Symptoms   The history is provided by the patient and medical records. This is a new problem. Episode onset: 2 weeks ago. Pertinent negatives include no chest pain, no chills and no nausea. Patient Active Problem List   Diagnosis Code    Arthritis M19.90    CAD (coronary artery disease) I25.10    GERD (gastroesophageal reflux disease) K21.9    Sarcoidosis D86.9    Ganglion cyst of wrist M67.439    Posterior vitreous detachment H43.819    Hypercholesterolemia E78.00    Urinary incontinence R32    Allergy, unspecified not elsewhere classified T78.40XA    CHF (congestive heart failure) ([facility]) I50.9    HTN (hypertension) I10    Atlanto-axial subluxation S13.120A    RAD (reactive airway disease) J45.909    Advance care planning Z71.89       Current Outpatient Medications:     KLOR-CON M20 20 mEq tablet, TAKE 1 TABLET BY MOUTH ONCE DAILY, Disp: 90 Tab, Rfl: 1    furosemide (LASIX) 20 mg tablet, TAKE 1 TABLET BY MOUTH ONCE DAILY, Disp: 90 Tab, Rfl: 1    CRESTOR 10 mg tablet, TAKE 1/2 (ONE-HALF) TABLET BY MOUTH ONCE DAILY AT NIGHT, Disp: 45 Tab, Rfl: 2    PROTONIX 40 mg tablet, Take 1 Tab by mouth daily. , Disp: 90 Tab, Rfl: 0    fesoterodine (TOVIAZ) 8 mg ER tablet, TAKE 1 TABLET BY MOUTH ONCE DAILY, Disp: 90 Tab, Rfl: 1    meloxicam (MOBIC) 15 mg tablet, TAKE ONE TABLET BY MOUTH ONCE DAILY, Disp: 90 Tab, Rfl: 1    carvedilol (COREG) 25 mg tablet, TAKE 1 TABLET BY MOUTH IN THE MORNING AND 1/2 (ONE-HALF) IN THE EVENING WITH MEALS, Disp: 135 Tab, Rfl: 2    raNITIdine (ZANTAC) 150 mg tablet, TAKE ONE TABLET BY MOUTH IN THE EVENING, Disp: 90 Tab, Rfl: 1    losartan (COZAAR) 25 mg tablet, Take  by mouth daily. , Disp: , Rfl:     levalbuterol tartrate (XOPENEX) 45 mcg/actuation inhaler, Take 2 Puffs by inhalation every six (6) hours as needed for Wheezing., Disp: 2 Inhaler, Rfl: 2    Calcium-Cholecalciferol, D3, (CALCIUM 600 WITH VITAMIN D3) 600 mg(1,500mg) -400 unit chew, Take 1,200 mg by mouth every other day., Disp: , Rfl:     loratadine (CLARITIN) 10 mg tablet, Take 10 mg by mouth., Disp: , Rfl:     fluticasone (FLONASE) 50 mcg/actuation nasal spray, 2 Sprays by Both Nostrils route daily. , Disp: 3 Bottle, Rfl: 3    nitroglycerin (NITROSTAT) 0.4 mg SL tablet, DISSOLVE ONE TABLET UNDER THE TONGUE EVERY 5 MINUTES AS NEEDED FOR CHEST PAIN. DO NOT EXCEED A TOTAL OF 3 DOSES IN 15 MINUTES, Disp: 50 Tab, Rfl: 0    VOLTAREN 1 % gel, APPLY 4G TO AFFECTED AREA FOUR TIMES DAILY, Disp: 400 g, Rfl: 2    ammonium lactate (AMLACTIN) 12 % topical cream, APPLY CREAM TOPICALLY TO AFFECTED AREA TWICE DAILY. (RUB IN AFFECTED AREA WELL), Disp: 385 g, Rfl: 2    BIOTIN PO, Take  by mouth daily. , Disp: , Rfl:     cholecalciferol, vitamin d3, (VITAMIN D) 1,000 unit tablet, Take 2,000 Units by mouth daily. , Disp: , Rfl:     aspirin delayed-release 81 mg tablet, Take 81 mg by mouth daily. , Disp: , Rfl:     multivitamin (ONE A DAY) tablet, Take 1 Tab by mouth daily. , Disp: , Rfl:     DOCOSAHEXANOIC ACID/EPA (FISH OIL PO), Take  by mouth daily. 4 tabs daily. , Disp: , Rfl:     cyclosporin (RESTASIS) 0.05 % ophthalmic emulsion, Administer 1 Drop to both eyes two (2) times a day., Disp: , Rfl:     Allergies   Allergen Reactions    Asa-Acetaminophen-Caff-Potass Other (comments)     GI upset    Bextra [Valdecoxib] Other (comments)     GI upset    Celebrex [Celecoxib] Other (comments)     GI upset    Naproxen Rash    Pcn [Penicillins] Swelling    Sulfur Rash         Review of Systems   Constitutional: Negative for chills and fever. HENT: Positive for congestion and sinus pain. Foul smelling nasal drainage   Respiratory: Positive for cough. Cardiovascular: Negative for chest pain and palpitations. Gastrointestinal: Negative for diarrhea and nausea.      Visit Vitals  /70 (BP 1 Location: Right arm, BP Patient Position: Sitting)   Pulse 79   Temp 98.3 °F (36.8 °C) (Oral)   Resp 16   Ht 5' 3\" (1.6 m)   Wt 192 lb (87.1 kg)   SpO2 97%   BMI 34.01 kg/m²       Physical Exam   Constitutional: She is oriented to person, place, and time. She appears well-developed and well-nourished. HENT:   Head: Normocephalic. Nose: Right sinus exhibits no maxillary sinus tenderness and no frontal sinus tenderness. Left sinus exhibits frontal sinus tenderness. Left sinus exhibits no maxillary sinus tenderness. Mouth/Throat: Oropharynx is clear and moist.   EACs impacted with cerumen bilaterally   Eyes: Conjunctivae and EOM are normal.   Neck: Neck supple. Cardiovascular: Normal rate, regular rhythm and normal heart sounds. Pulmonary/Chest: Effort normal and breath sounds normal.   Lymphadenopathy:     She has no cervical adenopathy. Neurological: She is alert and oriented to person, place, and time. Skin: Skin is warm and dry. Psychiatric: She has a normal mood and affect. Her behavior is normal.   Nursing note and vitals reviewed. ASSESSMENT and PLAN    ICD-10-CM ICD-9-CM    1. Acute frontal sinusitis, recurrence not specified J01.10 461.1 azithromycin (ZITHROMAX) 250 mg tablet   Complete prescribed course of antibiotics. Follow up for new symptoms, worsening symptoms or failure to improve.

## 2019-03-08 NOTE — PATIENT INSTRUCTIONS
Complete prescribed course of antibiotics. Follow up for new symptoms, worsening symptoms or failure to improve. Sinusitis: Care Instructions  Your Care Instructions    Sinusitis is an infection of the lining of the sinus cavities in your head. Sinusitis often follows a cold. It causes pain and pressure in your head and face. In most cases, sinusitis gets better on its own in 1 to 2 weeks. But some mild symptoms may last for several weeks. Sometimes antibiotics are needed. Follow-up care is a key part of your treatment and safety. Be sure to make and go to all appointments, and call your doctor if you are having problems. It's also a good idea to know your test results and keep a list of the medicines you take. How can you care for yourself at home? · Take an over-the-counter pain medicine, such as acetaminophen (Tylenol), ibuprofen (Advil, Motrin), or naproxen (Aleve). Read and follow all instructions on the label. · If the doctor prescribed antibiotics, take them as directed. Do not stop taking them just because you feel better. You need to take the full course of antibiotics. · Be careful when taking over-the-counter cold or flu medicines and Tylenol at the same time. Many of these medicines have acetaminophen, which is Tylenol. Read the labels to make sure that you are not taking more than the recommended dose. Too much acetaminophen (Tylenol) can be harmful. · Breathe warm, moist air from a steamy shower, a hot bath, or a sink filled with hot water. Avoid cold, dry air. Using a humidifier in your home may help. Follow the directions for cleaning the machine. · Use saline (saltwater) nasal washes to help keep your nasal passages open and wash out mucus and bacteria. You can buy saline nose drops at a grocery store or drugstore. Or you can make your own at home by adding 1 teaspoon of salt and 1 teaspoon of baking soda to 2 cups of distilled water.  If you make your own, fill a bulb syringe with the solution, insert the tip into your nostril, and squeeze gently. Nahum Jenaro your nose. · Put a hot, wet towel or a warm gel pack on your face 3 or 4 times a day for 5 to 10 minutes each time. · Try a decongestant nasal spray like oxymetazoline (Afrin). Do not use it for more than 3 days in a row. Using it for more than 3 days can make your congestion worse. When should you call for help? Call your doctor now or seek immediate medical care if:    · You have new or worse swelling or redness in your face or around your eyes.     · You have a new or higher fever.    Watch closely for changes in your health, and be sure to contact your doctor if:    · You have new or worse facial pain.     · The mucus from your nose becomes thicker (like pus) or has new blood in it.     · You are not getting better as expected. Where can you learn more? Go to http://john-yasir.info/. Enter R707 in the search box to learn more about \"Sinusitis: Care Instructions. \"  Current as of: March 27, 2018  Content Version: 11.9  © 8774-3806 [employer]. Care instructions adapted under license by [employer] (which disclaims liability or warranty for this information). If you have questions about a medical condition or this instruction, always ask your healthcare professional. [address] any warranty or liability for your use of this information.

## 2019-03-08 NOTE — PROGRESS NOTES
Braeden Burton is a 59 y.o. female (: 1955) presenting to address:    Chief Complaint   Patient presents with    Sinus Infection     Here for a possible sinus infection x 2 weeks. States that it seems to be getting worse. Vitals:    19 0741   BP: 108/70   Pulse: 79   Resp: 16   Temp: 98.3 °F (36.8 °C)   TempSrc: Oral   SpO2: 97%   Weight: 192 lb (87.1 kg)   Height: 5' 3\" (1.6 m)   PainSc:   0 - No pain       Hearing/Vision:   No exam data present    Learning Assessment:     Learning Assessment 2015   PRIMARY LEARNER Patient   HIGHEST LEVEL OF EDUCATION - PRIMARY LEARNER  2 YEARS OF COLLEGE   BARRIERS PRIMARY LEARNER NONE   CO-LEARNER CAREGIVER No   PRIMARY LANGUAGE ENGLISH   LEARNER PREFERENCE PRIMARY READING   ANSWERED BY self   RELATIONSHIP SELF     Depression Screening:     3 most recent PHQ Screens 3/8/2019   Little interest or pleasure in doing things Not at all   Feeling down, depressed, irritable, or hopeless Not at all   Total Score PHQ 2 0     Fall Risk Assessment:     Fall Risk Assessment, last 12 mths 2018   Able to walk? Yes   Fall in past 12 months? Yes   Fall with injury? No   Number of falls in past 12 months 2   Fall Risk Score 2     Abuse Screening:     Abuse Screening Questionnaire 2018   Do you ever feel afraid of your partner? N   Are you in a relationship with someone who physically or mentally threatens you? N   Is it safe for you to go home? Y     Coordination of Care Questionaire:   1. Have you been to the ER, urgent care clinic since your last visit? Hospitalized since your last visit? NO    2. Have you seen or consulted any other health care providers outside of the [address] [place] [address] since your last visit? Include any pap smears or colon screening. YES    Advanced Directive:   1. Do you have an Advanced Directive? NO    2. Would you like information on Advanced Directives?  NO

## 2019-03-19 ENCOUNTER — OFFICE VISIT (OUTPATIENT)
Dept: FAMILY MEDICINE CLINIC | Age: 64
End: 2019-03-19

## 2019-03-19 ENCOUNTER — HOSPITAL ENCOUNTER (OUTPATIENT)
Dept: LAB | Age: 64
Discharge: HOME | End: 2019-03-19
Payer: MEDICARE

## 2019-03-19 VITALS
RESPIRATION RATE: 20 BRPM | BODY MASS INDEX: 33.66 KG/M2 | HEART RATE: 70 BPM | OXYGEN SATURATION: 99 % | TEMPERATURE: 97.8 F | SYSTOLIC BLOOD PRESSURE: 110 MMHG | HEIGHT: 63 IN | WEIGHT: 190 LBS | DIASTOLIC BLOOD PRESSURE: 72 MMHG

## 2019-03-19 DIAGNOSIS — E78.00 HYPERCHOLESTEROLEMIA: ICD-10-CM

## 2019-03-19 DIAGNOSIS — I50.22 CHRONIC SYSTOLIC CONGESTIVE HEART FAILURE (HCC): ICD-10-CM

## 2019-03-19 DIAGNOSIS — Z12.39 BREAST CANCER SCREENING: ICD-10-CM

## 2019-03-19 DIAGNOSIS — E55.9 HYPOVITAMINOSIS D: ICD-10-CM

## 2019-03-19 DIAGNOSIS — I10 ESSENTIAL HYPERTENSION: Primary | ICD-10-CM

## 2019-03-19 DIAGNOSIS — Z12.31 ENCOUNTER FOR SCREENING MAMMOGRAM FOR MALIGNANT NEOPLASM OF BREAST: ICD-10-CM

## 2019-03-19 DIAGNOSIS — Z00.00 MEDICARE ANNUAL WELLNESS VISIT, SUBSEQUENT: ICD-10-CM

## 2019-03-19 LAB
25(OH)D3 SERPL-MCNC: 64 NG/ML (ref 30–100)
ALBUMIN SERPL-MCNC: 3.4 G/DL (ref 3.4–5)
ALBUMIN/GLOB SERPL: 0.9 {RATIO} (ref 0.8–1.7)
ALP SERPL-CCNC: 86 U/L (ref 45–117)
ALT SERPL-CCNC: 33 U/L (ref 13–56)
ANION GAP SERPL CALC-SCNC: 5 MMOL/L (ref 3–18)
APPEARANCE UR: CLEAR
AST SERPL-CCNC: 27 U/L (ref 15–37)
BACTERIA URNS QL MICRO: ABNORMAL /HPF
BASOPHILS # BLD: 0 K/UL (ref 0–0.1)
BASOPHILS NFR BLD: 1 % (ref 0–2)
BILIRUB DIRECT SERPL-MCNC: 0.2 MG/DL (ref 0–0.2)
BILIRUB SERPL-MCNC: 0.8 MG/DL (ref 0.2–1)
BILIRUB UR QL: NEGATIVE
BUN SERPL-MCNC: 18 MG/DL (ref 7–18)
BUN/CREAT SERPL: 17 (ref 12–20)
CALCIUM SERPL-MCNC: 8.3 MG/DL (ref 8.5–10.1)
CHLORIDE SERPL-SCNC: 105 MMOL/L (ref 100–108)
CHOLEST SERPL-MCNC: 116 MG/DL
CO2 SERPL-SCNC: 29 MMOL/L (ref 21–32)
COLOR UR: YELLOW
CREAT SERPL-MCNC: 1.09 MG/DL (ref 0.6–1.3)
DIFFERENTIAL METHOD BLD: ABNORMAL
EOSINOPHIL # BLD: 0.3 K/UL (ref 0–0.4)
EOSINOPHIL NFR BLD: 8 % (ref 0–5)
EPITH CASTS URNS QL MICRO: ABNORMAL /LPF (ref 0–5)
ERYTHROCYTE [DISTWIDTH] IN BLOOD BY AUTOMATED COUNT: 13.6 % (ref 11.6–14.5)
GLOBULIN SER CALC-MCNC: 3.8 G/DL (ref 2–4)
GLUCOSE SERPL-MCNC: 79 MG/DL (ref 74–99)
GLUCOSE UR STRIP.AUTO-MCNC: NEGATIVE MG/DL
HCT VFR BLD AUTO: 37.5 % (ref 35–45)
HDLC SERPL-MCNC: 55 MG/DL (ref 40–60)
HDLC SERPL: 2.1 {RATIO} (ref 0–5)
HGB BLD-MCNC: 11.5 G/DL (ref 12–16)
HGB UR QL STRIP: NEGATIVE
KETONES UR QL STRIP.AUTO: NEGATIVE MG/DL
LDLC SERPL CALC-MCNC: 51.8 MG/DL (ref 0–100)
LEUKOCYTE ESTERASE UR QL STRIP.AUTO: ABNORMAL
LIPID PROFILE,FLP: NORMAL
LYMPHOCYTES # BLD: 0.8 K/UL (ref 0.9–3.6)
LYMPHOCYTES NFR BLD: 26 % (ref 21–52)
MCH RBC QN AUTO: 27.6 PG (ref 24–34)
MCHC RBC AUTO-ENTMCNC: 30.7 G/DL (ref 31–37)
MCV RBC AUTO: 90.1 FL (ref 74–97)
MONOCYTES # BLD: 0.5 K/UL (ref 0.05–1.2)
MONOCYTES NFR BLD: 15 % (ref 3–10)
NEUTS SEG # BLD: 1.6 K/UL (ref 1.8–8)
NEUTS SEG NFR BLD: 50 % (ref 40–73)
NITRITE UR QL STRIP.AUTO: POSITIVE
PH UR STRIP: 5.5 [PH] (ref 5–8)
PLATELET # BLD AUTO: 215 K/UL (ref 135–420)
PMV BLD AUTO: 10.6 FL (ref 9.2–11.8)
POTASSIUM SERPL-SCNC: 4.3 MMOL/L (ref 3.5–5.5)
PROT SERPL-MCNC: 7.2 G/DL (ref 6.4–8.2)
PROT UR STRIP-MCNC: NEGATIVE MG/DL
RBC # BLD AUTO: 4.16 M/UL (ref 4.2–5.3)
RBC #/AREA URNS HPF: 0 /HPF (ref 0–5)
SODIUM SERPL-SCNC: 139 MMOL/L (ref 136–145)
SP GR UR REFRACTOMETRY: 1.01 (ref 1–1.03)
T4 FREE SERPL-MCNC: 1.3 NG/DL (ref 0.7–1.5)
TRIGL SERPL-MCNC: 46 MG/DL (ref ?–150)
TSH SERPL DL<=0.05 MIU/L-ACNC: 0.55 UIU/ML (ref 0.36–3.74)
UROBILINOGEN UR QL STRIP.AUTO: 0.2 EU/DL (ref 0.2–1)
VLDLC SERPL CALC-MCNC: 9.2 MG/DL
WBC # BLD AUTO: 3.2 K/UL (ref 4.6–13.2)
WBC URNS QL MICRO: ABNORMAL /HPF (ref 0–4)

## 2019-03-19 PROCEDURE — 36415 COLL VENOUS BLD VENIPUNCTURE: CPT

## 2019-03-19 PROCEDURE — 84439 ASSAY OF FREE THYROXINE: CPT

## 2019-03-19 PROCEDURE — 82306 VITAMIN D 25 HYDROXY: CPT

## 2019-03-19 PROCEDURE — 84443 ASSAY THYROID STIM HORMONE: CPT

## 2019-03-19 PROCEDURE — 80076 HEPATIC FUNCTION PANEL: CPT

## 2019-03-19 PROCEDURE — 85025 COMPLETE CBC W/AUTO DIFF WBC: CPT

## 2019-03-19 PROCEDURE — 81001 URINALYSIS AUTO W/SCOPE: CPT

## 2019-03-19 PROCEDURE — 80061 LIPID PANEL: CPT

## 2019-03-19 PROCEDURE — 80048 BASIC METABOLIC PNL TOTAL CA: CPT

## 2019-03-19 RX ORDER — LEVALBUTEROL TARTRATE 45 UG/1
90 AEROSOL, METERED ORAL
COMMUNITY
End: 2020-01-15 | Stop reason: SDUPTHER

## 2019-03-19 RX ORDER — NITROFURANTOIN 25; 75 MG/1; MG/1
100 CAPSULE ORAL 2 TIMES DAILY
Qty: 14 CAP | Refills: 0 | Status: SHIPPED | OUTPATIENT
Start: 2019-03-19 | End: 2020-03-12 | Stop reason: SDUPTHER

## 2019-03-19 NOTE — PROGRESS NOTES
Sarmad Murdock is a 59 y.o. female (: 1955) presenting to address:    Chief Complaint   Patient presents with    Annual Wellness Visit       Vitals:    19 0725   BP: 110/72   Pulse: 70   Resp: 20   Temp: 97.8 °F (36.6 °C)   TempSrc: Oral   SpO2: 99%   Weight: 190 lb (86.2 kg)   Height: 5' 3.12\" (1.603 m)   PainSc:   6   PainLoc: Ankle       Hearing/Vision:      Visual Acuity Screening    Right eye Left eye Both eyes   Without correction: 20/30 20/30 20/30   With correction:          Learning Assessment:     Learning Assessment 2015   PRIMARY LEARNER Patient   HIGHEST LEVEL OF EDUCATION - PRIMARY LEARNER  2 YEARS OF COLLEGE   BARRIERS PRIMARY LEARNER NONE   CO-LEARNER CAREGIVER No   PRIMARY LANGUAGE ENGLISH   LEARNER PREFERENCE PRIMARY READING   ANSWERED BY self   RELATIONSHIP SELF     Depression Screening:     3 most recent PHQ Screens 3/8/2019   Little interest or pleasure in doing things Not at all   Feeling down, depressed, irritable, or hopeless Not at all   Total Score PHQ 2 0     Fall Risk Assessment:     Fall Risk Assessment, last 12 mths 2018   Able to walk? Yes   Fall in past 12 months? Yes   Fall with injury? No   Number of falls in past 12 months 2   Fall Risk Score 2     Abuse Screening:     Abuse Screening Questionnaire 2018   Do you ever feel afraid of your partner? N   Are you in a relationship with someone who physically or mentally threatens you? N   Is it safe for you to go home? Y     Coordination of Care Questionaire:   1. Have you been to the ER, urgent care clinic since your last visit? Hospitalized since your last visit  No     2. Have you seen or consulted any other health care providers outside of the [address] [place] [address] since your last visit? Include any pap smears or colon screening. Yes, retina specialists [place] and debrillator ck at Dr Ninfa Solis. Pt is  Fasting. Advanced Directive:   1. Do you have an Advanced Directive?no    2.  Would you like information on Advanced Directives? no    Health Maintenance Due   Topic Date Due    MEDICARE YEARLY EXAM  02/15/2019    BREAST CANCER SCRN MAMMOGRAM  03/08/2019     Needs a mammo order [facility]

## 2019-03-19 NOTE — PATIENT INSTRUCTIONS
Medicare Wellness Visit, Female     The best way to live healthy is to have a lifestyle where you eat a well-balanced diet, exercise regularly, limit alcohol use, and quit all forms of tobacco/nicotine, if applicable. Regular preventive services are another way to keep healthy. Preventive services (vaccines, screening tests, monitoring & exams) can help personalize your care plan, which helps you manage your own care. Screening tests can find health problems at the earliest stages, when they are easiest to treat. [facility] follows the current, evidence-based guidelines published by the [facility] Efren Sylvester ([facility]) when recommending preventive services for our patients. Because we follow these guidelines, sometimes recommendations change over time as research supports it. (For example, mammograms used to be recommended annually. Even though Medicare will still pay for an annual mammogram, the newer guidelines recommend a mammogram every two years for women of average risk.)  Of course, you and your doctor may decide to screen more often for some diseases, based on your risk and your health status. Preventive services for you include:  - Medicare offers their members a free annual wellness visit, which is time for you and your primary care provider to discuss and plan for your preventive service needs. Take advantage of this benefit every year!  -All adults over the age of 72 should receive the recommended pneumonia vaccines. Current USPSTF guidelines recommend a series of two vaccines for the best pneumonia protection.   -All adults should have a flu vaccine yearly and a tetanus vaccine every 10 years. All adults age 61 and older should receive a shingles vaccine once in their lifetime.    -A bone mass density test is recommended when a woman turns 65 to screen for osteoporosis. This test is only recommended one time, as a screening.  Some providers will use this same test as a disease monitoring tool if you already have osteoporosis. -All adults age 38-68 who are overweight should have a diabetes screening test once every three years.   -Other screening tests and preventive services for persons with diabetes include: an eye exam to screen for diabetic retinopathy, a kidney function test, a foot exam, and stricter control over your cholesterol.   -Cardiovascular screening for adults with routine risk involves an electrocardiogram (ECG) at intervals determined by your doctor.   -Colorectal cancer screenings should be done for adults age 54-65 with no increased risk factors for colorectal cancer. There are a number of acceptable methods of screening for this type of cancer. Each test has its own benefits and drawbacks. Discuss with your doctor what is most appropriate for you during your annual wellness visit. The different tests include: colonoscopy (considered the best screening method), a fecal occult blood test, a fecal DNA test, and sigmoidoscopy. -Breast cancer screenings are recommended every other year for women of normal risk, age 54-69.  -Cervical cancer screenings for women over age 72 are only recommended with certain risk factors.   -All adults born between [facility] should be screened once for Hepatitis C.      Here is a list of your current Health Maintenance items (your personalized list of preventive services) with a due date:  Health Maintenance Due   Topic Date Due    Annual Well Visit  02/15/2019    Mammogram  03/08/2019

## 2019-03-19 NOTE — PROGRESS NOTES
Assessment/Plan:    *Diagnoses and all orders for this visit:    1. Essential hypertension    2. Breast cancer screening  -     [facility] MAMMO BI SCREENING INCL CAD; Future    3. Medicare annual wellness visit, subsequent    4. Encounter for screening mammogram for malignant neoplasm of breast  -     [facility] MAMMO BI SCREENING INCL CAD; Future    5. Hypercholesterolemia    6. Chronic systolic congestive heart failure (HCC)        F/u 4 months. Will get labs today. The plan was discussed with the patient. The patient verbalized understanding and is in agreement with the plan. All medication potential side effects were discussed with the patient.    -------------------------------------------------------------------------------------------------------------------        Jeffrey Pinedo is a 59 y.o. female and presents with Annual Wellness Visit         Subjective:  Pt here for f/u. HTN:  Well controlled. HLD: will repeat labs. Pt did not do her labs prior to the visit. CHF: followed by Dr. Roslynn Angelucci. Stable. No chest pain or SOB. ROS:  Review of Systems - Negative         The problem list was updated as a part of today's visit. Patient Active Problem List   Diagnosis Code    Arthritis M19.90    CAD (coronary artery disease) I25.10    GERD (gastroesophageal reflux disease) K21.9    Sarcoidosis D86.9    Ganglion cyst of wrist M67.439    Posterior vitreous detachment H43.819    Hypercholesterolemia E78.00    Urinary incontinence R32    Allergy, unspecified not elsewhere classified T78.40XA    CHF (congestive heart failure) (HCC) I50.9    HTN (hypertension) I10    Atlanto-axial subluxation S13.120A    RAD (reactive airway disease) J45.909    Advance care planning Z71.89       The PSH, FH were reviewed.         SH:  Social History     Tobacco Use    Smoking status: Former Smoker     Last attempt to quit: 1986     Years since quittin.6    Smokeless tobacco: Former User   Substance Use Topics    Alcohol use: No    Drug use: No       Medications/Allergies:  Current Outpatient Medications on File Prior to Visit   Medication Sig Dispense Refill    KLOR-CON M20 20 mEq tablet TAKE 1 TABLET BY MOUTH ONCE DAILY 90 Tab 1    furosemide (LASIX) 20 mg tablet TAKE 1 TABLET BY MOUTH ONCE DAILY 90 Tab 1    CRESTOR 10 mg tablet TAKE 1/2 (ONE-HALF) TABLET BY MOUTH ONCE DAILY AT NIGHT 45 Tab 2    PROTONIX 40 mg tablet Take 1 Tab by mouth daily. 90 Tab 0    fesoterodine (TOVIAZ) 8 mg ER tablet TAKE 1 TABLET BY MOUTH ONCE DAILY 90 Tab 1    meloxicam (MOBIC) 15 mg tablet TAKE ONE TABLET BY MOUTH ONCE DAILY 90 Tab 1    carvedilol (COREG) 25 mg tablet TAKE 1 TABLET BY MOUTH IN THE MORNING AND 1/2 (ONE-HALF) IN THE EVENING WITH MEALS 135 Tab 2    raNITIdine (ZANTAC) 150 mg tablet TAKE ONE TABLET BY MOUTH IN THE EVENING 90 Tab 1    losartan (COZAAR) 25 mg tablet Take  by mouth daily.  Calcium-Cholecalciferol, D3, (CALCIUM 600 WITH VITAMIN D3) 600 mg(1,500mg) -400 unit chew Take 1,200 mg by mouth every other day.  loratadine (CLARITIN) 10 mg tablet Take 10 mg by mouth.  fluticasone (FLONASE) 50 mcg/actuation nasal spray 2 Sprays by Both Nostrils route daily. 3 Bottle 3    nitroglycerin (NITROSTAT) 0.4 mg SL tablet DISSOLVE ONE TABLET UNDER THE TONGUE EVERY 5 MINUTES AS NEEDED FOR CHEST PAIN. DO NOT EXCEED A TOTAL OF 3 DOSES IN 15 MINUTES 50 Tab 0    VOLTAREN 1 % gel APPLY 4G TO AFFECTED AREA FOUR TIMES DAILY 400 g 2    ammonium lactate (AMLACTIN) 12 % topical cream APPLY CREAM TOPICALLY TO AFFECTED AREA TWICE DAILY. (RUB IN AFFECTED AREA WELL) 385 g 2    BIOTIN PO Take  by mouth daily.  cholecalciferol, vitamin d3, (VITAMIN D) 1,000 unit tablet Take 2,000 Units by mouth daily.  aspirin delayed-release 81 mg tablet Take 81 mg by mouth daily.  multivitamin (ONE A DAY) tablet Take 1 Tab by mouth daily.  DOCOSAHEXANOIC ACID/EPA (FISH OIL PO) Take  by mouth daily.  4 tabs daily.      cyclosporin (RESTASIS) 0.05 % ophthalmic emulsion Administer 1 Drop to both eyes two (2) times a day.  levalbuterol tartrate (XOPENEX HFA) 45 mcg/actuation inhaler Take 90 mcg by inhalation two (2) times daily as needed. 2 puffs twice daily as needed       No current facility-administered medications on file prior to visit. Allergies   Allergen Reactions    Asa-Acetaminophen-Caff-Potass Other (comments)     GI upset    Bextra [Valdecoxib] Other (comments)     GI upset    Celebrex [Celecoxib] Other (comments)     GI upset    Naproxen Rash    Pcn [Penicillins] Swelling    Sulfur Rash         Health Maintenance:   Health Maintenance   Topic Date Due    MEDICARE YEARLY EXAM  02/15/2019    BREAST CANCER SCRN MAMMOGRAM  03/08/2019    Shingrix Vaccine Age 50> (2 of 2) 03/31/2019    DTaP/Tdap/Td series (2 - Td) 02/24/2025    COLONOSCOPY  05/31/2026    Hepatitis C Screening  Completed    Pneumococcal 19-64 Medium Risk  Completed    Influenza Age 5 to Adult  Completed       Objective:  Visit Vitals  /72   Pulse 70   Temp 97.8 °F (36.6 °C) (Oral)   Resp 20   Ht 5' 3.12\" (1.603 m)   Wt 190 lb (86.2 kg)   SpO2 99%   BMI 33.53 kg/m²          Nurses notes and VS reviewed.       Physical Examination: General appearance - alert, well appearing, and in no distress  Ears - bilateral TM's and external ear canals normal  Mouth - mucous membranes moist, pharynx normal without lesions  Neck - supple, no significant adenopathy  Chest - clear to auscultation, no wheezes, rales or rhonchi, symmetric air entry  Heart - normal rate, regular rhythm, normal S1, S2, no murmurs, rubs, clicks or gallops  Abdomen - soft, nontender, nondistended, no masses or organomegaly  Breasts - breasts appear normal, no suspicious masses, no skin or nipple changes or axillary nodes  Musculoskeletal - no joint tenderness, deformity or swelling  Extremities - peripheral pulses normal, no pedal edema, no clubbing or cyanosis           Labwork and Ancillary Studies:    CBC w/Diff  Lab Results   Component Value Date/Time    WBC 2.9 (L) 05/02/2018 07:30 AM    HGB 12.8 05/02/2018 07:30 AM    PLATELET 168 03/09/3035 07:30 AM         Basic Metabolic Profile  Lab Results   Component Value Date/Time    Sodium 140 05/02/2018 07:30 AM    Potassium 4.9 05/02/2018 07:30 AM    Chloride 104 05/02/2018 07:30 AM    CO2 31 05/02/2018 07:30 AM    Anion gap 5 05/02/2018 07:30 AM    Glucose 83 05/02/2018 07:30 AM    BUN 13 05/02/2018 07:30 AM    Creatinine 1.10 05/02/2018 07:30 AM    BUN/Creatinine ratio 12 05/02/2018 07:30 AM    GFR est AA >60 05/02/2018 07:30 AM    GFR est non-AA 50 (L) 05/02/2018 07:30 AM    Calcium 8.7 05/02/2018 07:30 AM         LFT  Lab Results   Component Value Date/Time    ALT (SGPT) 38 05/02/2018 07:30 AM    AST (SGOT) 32 05/02/2018 07:30 AM    Alk.  phosphatase 74 05/02/2018 07:30 AM    Bilirubin, direct 0.2 05/02/2018 07:30 AM    Bilirubin, total 0.8 05/02/2018 07:30 AM         Cholesterol  Lab Results   Component Value Date/Time    Cholesterol, total 122 05/02/2018 07:30 AM    HDL Cholesterol 52 05/02/2018 07:30 AM    LDL, calculated 61.6 05/02/2018 07:30 AM    Triglyceride 42 05/02/2018 07:30 AM    CHOL/HDL Ratio 2.3 05/02/2018 07:30 AM

## 2019-03-19 NOTE — PROGRESS NOTES
Assessment/Plan:    *Diagnoses and all orders for this visit:    1. Essential hypertension    2. Breast cancer screening  -     [facility] MAMMO BI SCREENING INCL CAD; Future    3. Medicare annual wellness visit, subsequent    4. Encounter for screening mammogram for malignant neoplasm of breast  -     [facility] MAMMO BI SCREENING INCL CAD; Future    5. Hypercholesterolemia    6. Chronic systolic congestive heart failure (HCC)        F/u in 4 months. The plan was discussed with the patient. The patient verbalized understanding and is in agreement with the plan. All medication potential side effects were discussed with the patient.    -------------------------------------------------------------------------------------------------------------------        Joanie Slater is a 59 y.o. female and presents with Annual Wellness Visit         Subjective:  Pt here for f/u. HTN:  Well controlled. HLD: will repeat labs today. chronic CHF: stable, denies any SOB or chest pain. Sees Dr. Milad Flower regularly. ROS:  Review of Systems - Negative         The problem list was updated as a part of today's visit. Patient Active Problem List   Diagnosis Code    Arthritis M19.90    CAD (coronary artery disease) I25.10    GERD (gastroesophageal reflux disease) K21.9    Sarcoidosis D86.9    Ganglion cyst of wrist M67.439    Posterior vitreous detachment H43.819    Hypercholesterolemia E78.00    Urinary incontinence R32    Allergy, unspecified not elsewhere classified T78.40XA    CHF (congestive heart failure) (HCC) I50.9    HTN (hypertension) I10    Atlanto-axial subluxation S13.120A    RAD (reactive airway disease) J45.909    Advance care planning Z71.89       The PSH, FH were reviewed.         SH:  Social History     Tobacco Use    Smoking status: Former Smoker     Last attempt to quit: 1986     Years since quittin.6    Smokeless tobacco: Former User   Substance Use Topics    Alcohol use: No    Drug use: No       Medications/Allergies:  Current Outpatient Medications on File Prior to Visit   Medication Sig Dispense Refill    KLOR-CON M20 20 mEq tablet TAKE 1 TABLET BY MOUTH ONCE DAILY 90 Tab 1    furosemide (LASIX) 20 mg tablet TAKE 1 TABLET BY MOUTH ONCE DAILY 90 Tab 1    CRESTOR 10 mg tablet TAKE 1/2 (ONE-HALF) TABLET BY MOUTH ONCE DAILY AT NIGHT 45 Tab 2    PROTONIX 40 mg tablet Take 1 Tab by mouth daily. 90 Tab 0    fesoterodine (TOVIAZ) 8 mg ER tablet TAKE 1 TABLET BY MOUTH ONCE DAILY 90 Tab 1    meloxicam (MOBIC) 15 mg tablet TAKE ONE TABLET BY MOUTH ONCE DAILY 90 Tab 1    carvedilol (COREG) 25 mg tablet TAKE 1 TABLET BY MOUTH IN THE MORNING AND 1/2 (ONE-HALF) IN THE EVENING WITH MEALS 135 Tab 2    raNITIdine (ZANTAC) 150 mg tablet TAKE ONE TABLET BY MOUTH IN THE EVENING 90 Tab 1    losartan (COZAAR) 25 mg tablet Take  by mouth daily.  Calcium-Cholecalciferol, D3, (CALCIUM 600 WITH VITAMIN D3) 600 mg(1,500mg) -400 unit chew Take 1,200 mg by mouth every other day.  loratadine (CLARITIN) 10 mg tablet Take 10 mg by mouth.  fluticasone (FLONASE) 50 mcg/actuation nasal spray 2 Sprays by Both Nostrils route daily. 3 Bottle 3    nitroglycerin (NITROSTAT) 0.4 mg SL tablet DISSOLVE ONE TABLET UNDER THE TONGUE EVERY 5 MINUTES AS NEEDED FOR CHEST PAIN. DO NOT EXCEED A TOTAL OF 3 DOSES IN 15 MINUTES 50 Tab 0    VOLTAREN 1 % gel APPLY 4G TO AFFECTED AREA FOUR TIMES DAILY 400 g 2    ammonium lactate (AMLACTIN) 12 % topical cream APPLY CREAM TOPICALLY TO AFFECTED AREA TWICE DAILY. (RUB IN AFFECTED AREA WELL) 385 g 2    BIOTIN PO Take  by mouth daily.  cholecalciferol, vitamin d3, (VITAMIN D) 1,000 unit tablet Take 2,000 Units by mouth daily.  aspirin delayed-release 81 mg tablet Take 81 mg by mouth daily.  multivitamin (ONE A DAY) tablet Take 1 Tab by mouth daily.  DOCOSAHEXANOIC ACID/EPA (FISH OIL PO) Take  by mouth daily. 4 tabs daily.       cyclosporin (RESTASIS) 0.05 % ophthalmic emulsion Administer 1 Drop to both eyes two (2) times a day.  levalbuterol tartrate (XOPENEX HFA) 45 mcg/actuation inhaler Take 90 mcg by inhalation two (2) times daily as needed. 2 puffs twice daily as needed       No current facility-administered medications on file prior to visit. Allergies   Allergen Reactions    Asa-Acetaminophen-Caff-Potass Other (comments)     GI upset    Bextra [Valdecoxib] Other (comments)     GI upset    Celebrex [Celecoxib] Other (comments)     GI upset    Naproxen Rash    Pcn [Penicillins] Swelling    Sulfur Rash         Health Maintenance:   Health Maintenance   Topic Date Due    MEDICARE YEARLY EXAM  02/15/2019    BREAST CANCER SCRN MAMMOGRAM  03/08/2019    Shingrix Vaccine Age 50> (2 of 2) 03/31/2019    DTaP/Tdap/Td series (2 - Td) 02/24/2025    COLONOSCOPY  05/31/2026    Hepatitis C Screening  Completed    Pneumococcal 19-64 Medium Risk  Completed    Influenza Age 5 to Adult  Completed       Objective:  Visit Vitals  /72   Pulse 70   Temp 97.8 °F (36.6 °C) (Oral)   Resp 20   Ht 5' 3.12\" (1.603 m)   Wt 190 lb (86.2 kg)   SpO2 99%   BMI 33.53 kg/m²          Nurses notes and VS reviewed.       Physical Examination: General appearance - alert, well appearing, and in no distress  Ears - bilateral TM's and external ear canals normal  Mouth - mucous membranes moist, pharynx normal without lesions  Neck - supple, no significant adenopathy  Chest - clear to auscultation, no wheezes, rales or rhonchi, symmetric air entry  Heart - normal rate, regular rhythm, normal S1, S2, no murmurs, rubs, clicks or gallops  Abdomen - soft, nontender, nondistended, no masses or organomegaly  Breasts - breasts appear normal, no suspicious masses, no skin or nipple changes or axillary nodes  Musculoskeletal - no joint tenderness, deformity or swelling  Extremities - peripheral pulses normal, no pedal edema, no clubbing or cyanosis          Labwork and Ancillary Studies:    CBC w/Diff  Lab Results   Component Value Date/Time    WBC 2.9 (L) 05/02/2018 07:30 AM    HGB 12.8 05/02/2018 07:30 AM    PLATELET 476 50/88/9862 07:30 AM         Basic Metabolic Profile  Lab Results   Component Value Date/Time    Sodium 140 05/02/2018 07:30 AM    Potassium 4.9 05/02/2018 07:30 AM    Chloride 104 05/02/2018 07:30 AM    CO2 31 05/02/2018 07:30 AM    Anion gap 5 05/02/2018 07:30 AM    Glucose 83 05/02/2018 07:30 AM    BUN 13 05/02/2018 07:30 AM    Creatinine 1.10 05/02/2018 07:30 AM    BUN/Creatinine ratio 12 05/02/2018 07:30 AM    GFR est AA >60 05/02/2018 07:30 AM    GFR est non-AA 50 (L) 05/02/2018 07:30 AM    Calcium 8.7 05/02/2018 07:30 AM         LFT  Lab Results   Component Value Date/Time    ALT (SGPT) 38 05/02/2018 07:30 AM    AST (SGOT) 32 05/02/2018 07:30 AM    Alk.  phosphatase 74 05/02/2018 07:30 AM    Bilirubin, direct 0.2 05/02/2018 07:30 AM    Bilirubin, total 0.8 05/02/2018 07:30 AM         Cholesterol  Lab Results   Component Value Date/Time    Cholesterol, total 122 05/02/2018 07:30 AM    HDL Cholesterol 52 05/02/2018 07:30 AM    LDL, calculated 61.6 05/02/2018 07:30 AM    Triglyceride 42 05/02/2018 07:30 AM    CHOL/HDL Ratio 2.3 05/02/2018 07:30 AM

## 2019-03-19 NOTE — PROGRESS NOTES
This is the Subsequent Medicare Annual Wellness Exam, performed 12 months or more after the Initial AWV or the last Subsequent AWV    I have reviewed the patient's medical history in detail and updated the computerized patient record. History     Past Medical History:   Diagnosis Date    Advance care planning 2/17/2016    Discussed with pt today and handout given to pt to complete.  Allergy, unspecified not elsewhere classified 6/26/2010    Arthritis     Atlanto-axial subluxation 3/13/2015    CAD (coronary artery disease)     CHF (congestive heart failure) (HCC) 1/2/2014    Ganglion cyst of wrist     GERD (gastroesophageal reflux disease)     HTN (hypertension) 2/24/2015    Hypercholesterolemia     high LDL    Hypertension     Posterior vitreous detachment     RAD (reactive airway disease) 11/9/2015    Sarcoidosis     Urinary incontinence       History reviewed. No pertinent surgical history. Current Outpatient Medications   Medication Sig Dispense Refill    KLOR-CON M20 20 mEq tablet TAKE 1 TABLET BY MOUTH ONCE DAILY 90 Tab 1    furosemide (LASIX) 20 mg tablet TAKE 1 TABLET BY MOUTH ONCE DAILY 90 Tab 1    CRESTOR 10 mg tablet TAKE 1/2 (ONE-HALF) TABLET BY MOUTH ONCE DAILY AT NIGHT 45 Tab 2    PROTONIX 40 mg tablet Take 1 Tab by mouth daily. 90 Tab 0    fesoterodine (TOVIAZ) 8 mg ER tablet TAKE 1 TABLET BY MOUTH ONCE DAILY 90 Tab 1    meloxicam (MOBIC) 15 mg tablet TAKE ONE TABLET BY MOUTH ONCE DAILY 90 Tab 1    carvedilol (COREG) 25 mg tablet TAKE 1 TABLET BY MOUTH IN THE MORNING AND 1/2 (ONE-HALF) IN THE EVENING WITH MEALS 135 Tab 2    raNITIdine (ZANTAC) 150 mg tablet TAKE ONE TABLET BY MOUTH IN THE EVENING 90 Tab 1    losartan (COZAAR) 25 mg tablet Take  by mouth daily.  Calcium-Cholecalciferol, D3, (CALCIUM 600 WITH VITAMIN D3) 600 mg(1,500mg) -400 unit chew Take 1,200 mg by mouth every other day.  loratadine (CLARITIN) 10 mg tablet Take 10 mg by mouth.       fluticasone (FLONASE) 50 mcg/actuation nasal spray 2 Sprays by Both Nostrils route daily. 3 Bottle 3    nitroglycerin (NITROSTAT) 0.4 mg SL tablet DISSOLVE ONE TABLET UNDER THE TONGUE EVERY 5 MINUTES AS NEEDED FOR CHEST PAIN. DO NOT EXCEED A TOTAL OF 3 DOSES IN 15 MINUTES 50 Tab 0    VOLTAREN 1 % gel APPLY 4G TO AFFECTED AREA FOUR TIMES DAILY 400 g 2    ammonium lactate (AMLACTIN) 12 % topical cream APPLY CREAM TOPICALLY TO AFFECTED AREA TWICE DAILY. (RUB IN AFFECTED AREA WELL) 385 g 2    BIOTIN PO Take  by mouth daily.  cholecalciferol, vitamin d3, (VITAMIN D) 1,000 unit tablet Take 2,000 Units by mouth daily.  aspirin delayed-release 81 mg tablet Take 81 mg by mouth daily.  multivitamin (ONE A DAY) tablet Take 1 Tab by mouth daily.  DOCOSAHEXANOIC ACID/EPA (FISH OIL PO) Take  by mouth daily. 4 tabs daily.  cyclosporin (RESTASIS) 0.05 % ophthalmic emulsion Administer 1 Drop to both eyes two (2) times a day.  levalbuterol tartrate (XOPENEX HFA) 45 mcg/actuation inhaler Take 90 mcg by inhalation two (2) times daily as needed. 2 puffs twice daily as needed       Allergies   Allergen Reactions    Asa-Acetaminophen-Caff-Potass Other (comments)     GI upset    Bextra [Valdecoxib] Other (comments)     GI upset    Celebrex [Celecoxib] Other (comments)     GI upset    Naproxen Rash    Pcn [Penicillins] Swelling    Sulfur Rash     History reviewed. No pertinent family history.   Social History     Tobacco Use    Smoking status: Former Smoker     Last attempt to quit: 1986     Years since quittin.6    Smokeless tobacco: Former User   Substance Use Topics    Alcohol use: No     Patient Active Problem List   Diagnosis Code    Arthritis M19.90    CAD (coronary artery disease) I25.10    GERD (gastroesophageal reflux disease) K21.9    Sarcoidosis D86.9    Ganglion cyst of wrist M67.439    Posterior vitreous detachment H43.819    Hypercholesterolemia E78.00    Urinary incontinence R32    Allergy, unspecified not elsewhere classified T78.40XA    CHF (congestive heart failure) ([facility]) I50.9    HTN (hypertension) I10    Atlanto-axial subluxation S13.120A    RAD (reactive airway disease) J45.909    Advance care planning Z71.89       Depression Risk Factor Screening:     3 most recent PHQ Screens 3/19/2019   Little interest or pleasure in doing things Not at all   Feeling down, depressed, irritable, or hopeless Not at all   Total Score PHQ 2 0     Alcohol Risk Factor Screening: You do not drink alcohol or very rarely. Functional Ability and Level of Safety:   Hearing Loss  Hearing is good. Activities of Daily Living  The home contains: no safety equipment. Patient does total self care    Fall Risk  Fall Risk Assessment, last 12 mths 3/19/2019   Able to walk? Yes   Fall in past 12 months? No   Fall with injury? -   Number of falls in past 12 months -   Fall Risk Score -       Abuse Screen  Patient is not abused    Cognitive Screening   Evaluation of Cognitive Function:  Has your family/caregiver stated any concerns about your memory: no  Normal    Patient Care Team   Patient Care Team:  Christiane Ye MD as PCP - Neyda Heller MD as Physician (Rheumatology)    Assessment/Plan   Education and counseling provided:  Are appropriate based on today's review and evaluation    Diagnoses and all orders for this visit:    1. Medicare annual wellness visit, subsequent    2. Breast cancer screening  -     [facility] MAMMO BI SCREENING INCL CAD; Future    3. Encounter for screening mammogram for malignant neoplasm of breast  -     [facility] MAMMO BI SCREENING INCL CAD;  Future        Health Maintenance Due   Topic Date Due    MEDICARE YEARLY EXAM  02/15/2019    BREAST CANCER SCRN MAMMOGRAM  03/08/2019

## 2019-03-20 NOTE — PROGRESS NOTES
Her UA shows she has a UTI. I will send in Macrobid 100 BID x 7 days. She needs to increase her daily water intake. Otherwise, her labs look good.

## 2019-04-12 DIAGNOSIS — Z12.31 ENCOUNTER FOR SCREENING MAMMOGRAM FOR MALIGNANT NEOPLASM OF BREAST: ICD-10-CM

## 2019-04-12 DIAGNOSIS — Z12.39 BREAST CANCER SCREENING: ICD-10-CM

## 2019-04-30 ENCOUNTER — TELEPHONE (OUTPATIENT)
Dept: FAMILY MEDICINE CLINIC | Age: 64
End: 2019-04-30

## 2019-04-30 NOTE — TELEPHONE ENCOUNTER
Patient called and is concerned because her Losartan has been recalled. Wants to know what she should do. She stated that it was the one that she has been taking for years, so now she is worried.

## 2019-05-01 RX ORDER — IRBESARTAN 75 MG/1
75 TABLET ORAL
Qty: 90 TAB | Refills: 1 | Status: SHIPPED | OUTPATIENT
Start: 2019-05-01 | End: 2019-05-06

## 2019-05-03 ENCOUNTER — TELEPHONE (OUTPATIENT)
Dept: FAMILY MEDICINE CLINIC | Age: 64
End: 2019-05-03

## 2019-05-03 NOTE — TELEPHONE ENCOUNTER
Patient called and stated that the Avapro is on back order at her pharmacy, and that she had called her cardiologist and he changed her medication to Valsartan 80mg.

## 2019-05-06 RX ORDER — VALSARTAN 40 MG/1
40 TABLET ORAL DAILY
COMMUNITY

## 2019-07-17 ENCOUNTER — OFFICE VISIT (OUTPATIENT)
Dept: FAMILY MEDICINE CLINIC | Age: 64
End: 2019-07-17

## 2019-07-17 VITALS
HEIGHT: 63 IN | DIASTOLIC BLOOD PRESSURE: 67 MMHG | WEIGHT: 186 LBS | SYSTOLIC BLOOD PRESSURE: 107 MMHG | HEART RATE: 74 BPM | BODY MASS INDEX: 32.96 KG/M2 | TEMPERATURE: 96.5 F | OXYGEN SATURATION: 99 % | RESPIRATION RATE: 16 BRPM

## 2019-07-17 DIAGNOSIS — L85.3 DRY SKIN: ICD-10-CM

## 2019-07-17 DIAGNOSIS — J01.00 ACUTE NON-RECURRENT MAXILLARY SINUSITIS: ICD-10-CM

## 2019-07-17 DIAGNOSIS — E78.00 HYPERCHOLESTEROLEMIA: ICD-10-CM

## 2019-07-17 DIAGNOSIS — I10 ESSENTIAL HYPERTENSION: Primary | ICD-10-CM

## 2019-07-17 RX ORDER — DOXYCYCLINE 100 MG/1
100 TABLET ORAL 2 TIMES DAILY
Qty: 20 TAB | Refills: 0 | Status: SHIPPED | OUTPATIENT
Start: 2019-07-17 | End: 2019-07-27

## 2019-07-17 RX ORDER — RANITIDINE 150 MG/1
TABLET, FILM COATED ORAL
Qty: 90 TAB | Refills: 2 | Status: SHIPPED | OUTPATIENT
Start: 2019-07-17 | End: 2019-11-18

## 2019-07-17 RX ORDER — AMMONIUM LACTATE 12 G/100G
CREAM TOPICAL
Qty: 385 G | Refills: 2 | Status: SHIPPED | OUTPATIENT
Start: 2019-07-17

## 2019-07-17 NOTE — PATIENT INSTRUCTIONS

## 2019-07-17 NOTE — PROGRESS NOTES
Assessment/Plan:    *Diagnoses and all orders for this visit:    1. Essential hypertension    2. Dry skin  -     ammonium lactate (AMLACTIN) 12 % topical cream; APPLY CREAM TOPICALLY TO AFFECTED AREA TWICE DAILY. (RUB IN AFFECTED AREA WELL)    3. Hypercholesterolemia    4. Acute non-recurrent maxillary sinusitis  -     doxycycline (ADOXA) 100 mg tablet; Take 1 Tab by mouth two (2) times a day for 10 days. Other orders  -     raNITIdine (ZANTAC) 150 mg tablet; TAKE 1 TABLET BY MOUTH IN THE EVENING        F/u 4 months. The plan was discussed with the patient. The patient verbalized understanding and is in agreement with the plan. All medication potential side effects were discussed with the patient.    -------------------------------------------------------------------------------------------------------------------        Callie Elizondo is a 62 y.o. female and presents with Hypertension; Nasal Congestion (2 to 3 weeks ); and Nasal Pain (sores in nose )         Subjective:  Pt here for f/u. Has been doing well. HTN:  Well controlled. Has symptoms of a new sinus infection. She gets these periodically, yellow green mucus, irritated nose, sinus congestion. HLD:  Stable on last BW. Has chronic dry skin, lac-hydrin helps her and she needs a new Rx.      ROS:  Review of Systems - Negative except as above        The problem list was updated as a part of today's visit.   Patient Active Problem List   Diagnosis Code    Arthritis M19.90    CAD (coronary artery disease) I25.10    GERD (gastroesophageal reflux disease) K21.9    Sarcoidosis D86.9    Ganglion cyst of wrist M67.439    Posterior vitreous detachment H43.819    Hypercholesterolemia E78.00    Urinary incontinence R32    Allergy, unspecified not elsewhere classified T78.40XA    CHF (congestive heart failure) ([facility]) I50.9    HTN (hypertension) I10    Atlanto-axial subluxation S13.120A    RAD (reactive airway disease) J45.909    Advance care planning Z71.89       The PS,  were reviewed. SH:  Social History     Tobacco Use    Smoking status: Former Smoker     Last attempt to quit: 1986     Years since quittin.9    Smokeless tobacco: Former User   Substance Use Topics    Alcohol use: No    Drug use: No       Medications/Allergies:  Current Outpatient Medications on File Prior to Visit   Medication Sig Dispense Refill    valsartan (DIOVAN) 80 mg tablet Take  by mouth daily.  PROTONIX 40 mg tablet TAKE 1 TABLET BY MOUTH ONCE DAILY 90 Tab 2    levalbuterol tartrate (XOPENEX HFA) 45 mcg/actuation inhaler Take 90 mcg by inhalation two (2) times daily as needed. 2 puffs twice daily as needed      KLOR-CON M20 20 mEq tablet TAKE 1 TABLET BY MOUTH ONCE DAILY 90 Tab 1    furosemide (LASIX) 20 mg tablet TAKE 1 TABLET BY MOUTH ONCE DAILY 90 Tab 1    CRESTOR 10 mg tablet TAKE 1/2 (ONE-HALF) TABLET BY MOUTH ONCE DAILY AT NIGHT 45 Tab 2    fesoterodine (TOVIAZ) 8 mg ER tablet TAKE 1 TABLET BY MOUTH ONCE DAILY 90 Tab 1    meloxicam (MOBIC) 15 mg tablet TAKE ONE TABLET BY MOUTH ONCE DAILY 90 Tab 1    carvedilol (COREG) 25 mg tablet TAKE 1 TABLET BY MOUTH IN THE MORNING AND 1/2 (ONE-HALF) IN THE EVENING WITH MEALS 135 Tab 2    Calcium-Cholecalciferol, D3, (CALCIUM 600 WITH VITAMIN D3) 600 mg(1,500mg) -400 unit chew Take 1,200 mg by mouth daily.  loratadine (CLARITIN) 10 mg tablet Take 10 mg by mouth.  fluticasone (FLONASE) 50 mcg/actuation nasal spray 2 Sprays by Both Nostrils route daily. 3 Bottle 3    nitroglycerin (NITROSTAT) 0.4 mg SL tablet DISSOLVE ONE TABLET UNDER THE TONGUE EVERY 5 MINUTES AS NEEDED FOR CHEST PAIN. DO NOT EXCEED A TOTAL OF 3 DOSES IN 15 MINUTES 50 Tab 0    VOLTAREN 1 % gel APPLY 4G TO AFFECTED AREA FOUR TIMES DAILY 400 g 2    BIOTIN PO Take  by mouth daily.  cholecalciferol, vitamin d3, (VITAMIN D) 1,000 unit tablet Take 2,000 Units by mouth daily.       aspirin delayed-release 81 mg tablet Take 81 mg by mouth daily.  multivitamin (ONE A DAY) tablet Take 1 Tab by mouth daily.  DOCOSAHEXANOIC ACID/EPA (FISH OIL PO) Take  by mouth daily. 4 tabs daily.  cyclosporin (RESTASIS) 0.05 % ophthalmic emulsion Administer 1 Drop to both eyes two (2) times a day.  nitrofurantoin, macrocrystal-monohydrate, (MACROBID) 100 mg capsule Take 1 Cap by mouth two (2) times a day. 14 Cap 0     No current facility-administered medications on file prior to visit. Allergies   Allergen Reactions    Asa-Acetaminophen-Caff-Potass Other (comments)     GI upset    Bextra [Valdecoxib] Other (comments)     GI upset    Celebrex [Celecoxib] Other (comments)     GI upset    Naproxen Rash    Pcn [Penicillins] Swelling    Sulfur Rash         Health Maintenance:   Health Maintenance   Topic Date Due    Shingrix Vaccine Age 49> (2 of 2) 03/31/2019    Influenza Age 5 to Adult  08/01/2019    MEDICARE YEARLY EXAM  03/19/2020    BREAST CANCER SCRN MAMMOGRAM  03/29/2020    Bone Densitometry (Dexa) Screening  04/12/2021    DTaP/Tdap/Td series (2 - Td) 02/24/2025    COLONOSCOPY  05/31/2026    Hepatitis C Screening  Completed    Pneumococcal 0-64 years  Completed       Objective:  Visit Vitals  /67 (BP 1 Location: Left arm, BP Patient Position: Sitting)   Pulse 74   Temp 96.5 °F (35.8 °C) (Oral)   Resp 16   Ht 5' 3.12\" (1.603 m)   Wt 186 lb (84.4 kg)   SpO2 99%   BMI 32.82 kg/m²          Nurses notes and VS reviewed.       Physical Examination: General appearance - alert, well appearing, and in no distress  Ears - bilateral TM's and external ear canals normal  Nose - mucosal congestion and sinus tenderness noted in maxillary regions  Mouth - erythematous  Neck - supple, no significant adenopathy  Chest - clear to auscultation, no wheezes, rales or rhonchi, symmetric air entry  Heart - normal rate, regular rhythm, normal S1, S2, no murmurs, rubs, clicks or gallops          Labwork and Ancillary Studies:    CBC w/Diff  Lab Results   Component Value Date/Time    WBC 3.2 (L) 03/19/2019 08:32 AM    HGB 11.5 (L) 03/19/2019 08:32 AM    PLATELET 126 05/04/7845 08:32 AM         Basic Metabolic Profile  Lab Results   Component Value Date/Time    Sodium 139 03/19/2019 08:32 AM    Potassium 4.3 03/19/2019 08:32 AM    Chloride 105 03/19/2019 08:32 AM    CO2 29 03/19/2019 08:32 AM    Anion gap 5 03/19/2019 08:32 AM    Glucose 79 03/19/2019 08:32 AM    BUN 18 03/19/2019 08:32 AM    Creatinine 1.09 03/19/2019 08:32 AM    BUN/Creatinine ratio 17 03/19/2019 08:32 AM    GFR est AA >60 03/19/2019 08:32 AM    GFR est non-AA 51 (L) 03/19/2019 08:32 AM    Calcium 8.3 (L) 03/19/2019 08:32 AM         LFT  Lab Results   Component Value Date/Time    ALT (SGPT) 33 03/19/2019 08:32 AM    AST (SGOT) 27 03/19/2019 08:32 AM    Alk.  phosphatase 86 03/19/2019 08:32 AM    Bilirubin, direct 0.2 03/19/2019 08:32 AM    Bilirubin, total 0.8 03/19/2019 08:32 AM         Cholesterol  Lab Results   Component Value Date/Time    Cholesterol, total 116 03/19/2019 08:32 AM    HDL Cholesterol 55 03/19/2019 08:32 AM    LDL, calculated 51.8 03/19/2019 08:32 AM    Triglyceride 46 03/19/2019 08:32 AM    CHOL/HDL Ratio 2.1 03/19/2019 08:32 AM

## 2019-07-17 NOTE — PROGRESS NOTES
To Keenan is a 62 y.o. female (: 1955) presenting to address:    Chief Complaint   Patient presents with    Hypertension    Nasal Congestion     2 to 3 weeks     Nasal Pain     sores in nose        Vitals:    19 0754   BP: 107/67   Pulse: 74   Resp: 16   Temp: 96.5 °F (35.8 °C)   TempSrc: Oral   SpO2: 99%   Weight: 186 lb (84.4 kg)   Height: 5' 3.12\" (1.603 m)   PainSc:   5   PainLoc: Foot       Hearing/Vision:   No exam data present    Learning Assessment:     Learning Assessment 2015   PRIMARY LEARNER Patient   HIGHEST LEVEL OF EDUCATION - PRIMARY LEARNER  2 YEARS OF COLLEGE   BARRIERS PRIMARY LEARNER NONE   CO-LEARNER CAREGIVER No   PRIMARY LANGUAGE ENGLISH   LEARNER PREFERENCE PRIMARY READING   ANSWERED BY self   RELATIONSHIP SELF     Depression Screening:     3 most recent PHQ Screens 3/19/2019   Little interest or pleasure in doing things Not at all   Feeling down, depressed, irritable, or hopeless Not at all   Total Score PHQ 2 0     Fall Risk Assessment:     Fall Risk Assessment, last 12 mths 3/19/2019   Able to walk? Yes   Fall in past 12 months? No   Fall with injury? -   Number of falls in past 12 months -   Fall Risk Score -     Abuse Screening:     Abuse Screening Questionnaire 3/19/2019   Do you ever feel afraid of your partner? N   Are you in a relationship with someone who physically or mentally threatens you? N   Is it safe for you to go home? Y     Coordination of Care Questionaire:   1. Have you been to the ER, urgent care clinic since your last visit? Hospitalized since your last visit? NO    2. Have you seen or consulted any other health care providers outside of the [address] [place] [address] since your last visit? Include any pap smears or colon screening. NO    Advanced Directive:   1. Do you have an Advanced Directive? NO    2. Would you like information on Advanced Directives?  NO

## 2019-08-02 DIAGNOSIS — I10 ESSENTIAL HYPERTENSION: ICD-10-CM

## 2019-08-02 RX ORDER — CARVEDILOL 25 MG/1
TABLET ORAL
Qty: 135 TAB | Refills: 2 | Status: SHIPPED | OUTPATIENT
Start: 2019-08-02 | End: 2019-11-18

## 2019-08-02 RX ORDER — CARVEDILOL 25 MG/1
TABLET ORAL
Qty: 135 TAB | Refills: 2 | Status: SHIPPED | OUTPATIENT
Start: 2019-08-02 | End: 2020-08-12

## 2019-09-22 RX ORDER — MELOXICAM 15 MG/1
TABLET ORAL
Qty: 90 TAB | Refills: 1 | Status: SHIPPED | OUTPATIENT
Start: 2019-09-22 | End: 2020-03-19

## 2019-10-11 ENCOUNTER — PATIENT OUTREACH (OUTPATIENT)
Dept: FAMILY MEDICINE CLINIC | Age: 64
End: 2019-10-11

## 2019-10-11 NOTE — PROGRESS NOTES
.Complex Case Management Denial       Date/Time:  10/11/2019 3:19 PM    Method of communication with patient:phone    [facility] ( [facility]) contacted the patient by telephone to perform Ambulatory Care Coordination. Verified name and  with patient as identifiers. Provided introduction to self, and explanation of the Ambulatory Care Manager's role. Reviewed most recent clinic visit w/ patient who verbalized understanding. Patient given an opportunity to ask questions. The patient Declines Care Coordination Services at this time. State's she has no issues, she is healthy     The patient agrees to contact the PCP office or the [address] [place] [address] for questions related to their healthcare. [facility] provided contact information for future reference.

## 2019-11-15 ENCOUNTER — OFFICE VISIT (OUTPATIENT)
Dept: FAMILY MEDICINE CLINIC | Age: 64
End: 2019-11-15

## 2019-11-15 VITALS
HEIGHT: 63 IN | TEMPERATURE: 96.8 F | DIASTOLIC BLOOD PRESSURE: 77 MMHG | HEART RATE: 80 BPM | BODY MASS INDEX: 32.6 KG/M2 | RESPIRATION RATE: 16 BRPM | OXYGEN SATURATION: 97 % | SYSTOLIC BLOOD PRESSURE: 113 MMHG | WEIGHT: 184 LBS

## 2019-11-15 DIAGNOSIS — I10 ESSENTIAL HYPERTENSION: Primary | ICD-10-CM

## 2019-11-15 DIAGNOSIS — M79.605 LEFT LEG PAIN: ICD-10-CM

## 2019-11-15 DIAGNOSIS — J01.00 ACUTE NON-RECURRENT MAXILLARY SINUSITIS: ICD-10-CM

## 2019-11-15 DIAGNOSIS — M19.90 ARTHRITIS: ICD-10-CM

## 2019-11-15 DIAGNOSIS — E78.00 HYPERCHOLESTEROLEMIA: ICD-10-CM

## 2019-11-15 DIAGNOSIS — E55.9 HYPOVITAMINOSIS D: ICD-10-CM

## 2019-11-15 RX ORDER — DOXYCYCLINE 100 MG/1
100 TABLET ORAL 2 TIMES DAILY
Qty: 20 TAB | Refills: 0 | Status: SHIPPED | OUTPATIENT
Start: 2019-11-15 | End: 2019-11-25

## 2019-11-15 NOTE — PROGRESS NOTES
Assessment/Plan:    *Diagnoses and all orders for this visit:    1. Essential hypertension    2. Hypercholesterolemia    3. Arthritis    4. Acute non-recurrent maxillary sinusitis  -     doxycycline (ADOXA) 100 mg tablet; Take 1 Tab by mouth two (2) times a day for 10 days. 5. Left leg pain        She will cont with the voltaren, suggested she rub this on before bed and wear a sock over it to keep the leg warm. She will see how this works and will notify me if not better. Will then     The plan was discussed with the patient. The patient verbalized understanding and is in agreement with the plan. All medication potential side effects were discussed with the patient.    -------------------------------------------------------------------------------------------------------------------        Tiki Mcmillan is a 62 y.o. female and presents with Hypertension and Nasal Discharge         Subjective:  Pt here for 4 month f/u. Has been doing well. HTN:  Well controlled. HLD: very well controlled on last BW. Arthritis: stable. Has been having sinus pressure, sinus pain,  Green mucus, no fevers. Has a new pain along the lower LT leg, appears only at night, while sleeping and will wake her up. She spoke to rheumatology thinking it was arthritis but Dr. Clemencia Wilde said it was probably a nerve issue. But the pain is only from the knee down. When she rubs Voltaren gel on it, the pain resolves. ROS:  Review of Systems - Negative         The problem list was updated as a part of today's visit.   Patient Active Problem List   Diagnosis Code    Arthritis M19.90    CAD (coronary artery disease) I25.10    GERD (gastroesophageal reflux disease) K21.9    Sarcoidosis D86.9    Ganglion cyst of wrist M67.439    Posterior vitreous detachment H43.819    Hypercholesterolemia E78.00    Urinary incontinence R32    Allergy, unspecified not elsewhere classified T78.40XA    CHF (congestive heart failure) ([facility] [address] I50.9    HTN (hypertension) I10    Atlanto-axial subluxation S13.120A    RAD (reactive airway disease) J45.909    Advance care planning Z71.89       The PSH, FH were reviewed. SH:  Social History     Tobacco Use    Smoking status: Former Smoker     Last attempt to quit: 1986     Years since quittin.3    Smokeless tobacco: Former User   Substance Use Topics    Alcohol use: No    Drug use: No       Medications/Allergies:  Current Outpatient Medications on File Prior to Visit   Medication Sig Dispense Refill    rosuvastatin (CRESTOR) 10 mg tablet TAKE 1/2 (ONE-HALF) TABLET BY MOUTH ONCE DAILY IN THE EVENING 45 Tab 2    carvedilol (COREG) 25 mg tablet TAKE 1 TABLET BY MOUTH IN THE MORNING AND 1/2 (ONE-HALF) IN THE EVENING WITH MEALS 135 Tab 2    fesoterodine (TOVIAZ) 8 mg ER tablet TAKE 1 TABLET BY MOUTH ONCE DAILY 90 Tab 2    raNITIdine (ZANTAC) 150 mg tablet TAKE 1 TABLET BY MOUTH IN THE EVENING 90 Tab 1    ammonium lactate (AMLACTIN) 12 % topical cream APPLY CREAM TOPICALLY TO AFFECTED AREA TWICE DAILY. (RUB IN AFFECTED AREA WELL) 385 g 2    valsartan (DIOVAN) 80 mg tablet Take  by mouth daily.  PROTONIX 40 mg tablet TAKE 1 TABLET BY MOUTH ONCE DAILY 90 Tab 2    levalbuterol tartrate (XOPENEX HFA) 45 mcg/actuation inhaler Take 90 mcg by inhalation two (2) times daily as needed. 2 puffs twice daily as needed      KLOR-CON M20 20 mEq tablet TAKE 1 TABLET BY MOUTH ONCE DAILY 90 Tab 1    furosemide (LASIX) 20 mg tablet TAKE 1 TABLET BY MOUTH ONCE DAILY 90 Tab 1    meloxicam (MOBIC) 15 mg tablet TAKE ONE TABLET BY MOUTH ONCE DAILY 90 Tab 1    Calcium-Cholecalciferol, D3, (CALCIUM 600 WITH VITAMIN D3) 600 mg(1,500mg) -400 unit chew Take 1,200 mg by mouth daily.  loratadine (CLARITIN) 10 mg tablet Take 10 mg by mouth.  fluticasone (FLONASE) 50 mcg/actuation nasal spray 2 Sprays by Both Nostrils route daily.  3 Bottle 3    nitroglycerin (NITROSTAT) 0.4 mg SL tablet DISSOLVE ONE TABLET UNDER THE TONGUE EVERY 5 MINUTES AS NEEDED FOR CHEST PAIN. DO NOT EXCEED A TOTAL OF 3 DOSES IN 15 MINUTES 50 Tab 0    VOLTAREN 1 % gel APPLY 4G TO AFFECTED AREA FOUR TIMES DAILY 400 g 2    BIOTIN PO Take  by mouth daily.  cholecalciferol, vitamin d3, (VITAMIN D) 1,000 unit tablet Take 2,000 Units by mouth daily.  aspirin delayed-release 81 mg tablet Take 81 mg by mouth daily.  multivitamin (ONE A DAY) tablet Take 1 Tab by mouth daily.  DOCOSAHEXANOIC ACID/EPA (FISH OIL PO) Take  by mouth daily. 4 tabs daily.  cyclosporin (RESTASIS) 0.05 % ophthalmic emulsion Administer 1 Drop to both eyes two (2) times a day.  meloxicam (MOBIC) 15 mg tablet TAKE 1 TABLET BY MOUTH ONCE DAILY 90 Tab 1    carvedilol (COREG) 25 mg tablet TAKE 1 TABLET BY MOUTH ONCE DAILY IN THE MORNING AND 1/2 (ONE-HALF) ONCE DAILY IN THE EVENING WITH MEALS 135 Tab 2    raNITIdine (ZANTAC) 150 mg tablet TAKE 1 TABLET BY MOUTH IN THE EVENING 90 Tab 2    nitrofurantoin, macrocrystal-monohydrate, (MACROBID) 100 mg capsule Take 1 Cap by mouth two (2) times a day. 14 Cap 0     No current facility-administered medications on file prior to visit.          Allergies   Allergen Reactions    Asa-Acetaminophen-Caff-Potass Other (comments)     GI upset    Bextra [Valdecoxib] Other (comments)     GI upset    Celebrex [Celecoxib] Other (comments)     GI upset    Naproxen Rash    Pcn [Penicillins] Swelling    Sulfur Rash         Health Maintenance:   Health Maintenance   Topic Date Due    Shingrix Vaccine Age 49> (2 of 2) 03/31/2019    Influenza Age 5 to Adult  08/01/2019    MEDICARE YEARLY EXAM  03/19/2020    BREAST CANCER SCRN MAMMOGRAM  03/29/2020    Bone Densitometry (Dexa) Screening  04/12/2021    DTaP/Tdap/Td series (2 - Td) 02/24/2025    COLONOSCOPY  05/31/2026    Hepatitis C Screening  Completed    Pneumococcal 0-64 years  Completed       Objective:  Visit Vitals  /77   Pulse 80   Temp 96.8 °F (36 °C) (Oral)   Resp 16   Ht 5' 3.12\" (1.603 m)   Wt 184 lb (83.5 kg)   SpO2 97%   BMI 32.47 kg/m²          Nurses notes and VS reviewed. Physical Examination: General appearance - ill-appearing  Nose - + sinus pressure, pain  Ears - NL  Chest - clear to auscultation, no wheezes, rales or rhonchi, symmetric air entry  Heart - normal rate and regular rhythm  Abdomen - soft, nontender, nondistended, no masses or organomegaly  Extremities - mild ankle swelling          Labwork and Ancillary Studies:    CBC w/Diff  Lab Results   Component Value Date/Time    WBC 3.2 (L) 03/19/2019 08:32 AM    HGB 11.5 (L) 03/19/2019 08:32 AM    PLATELET 030 92/41/7866 08:32 AM         Basic Metabolic Profile  Lab Results   Component Value Date/Time    Sodium 139 03/19/2019 08:32 AM    Potassium 4.3 03/19/2019 08:32 AM    Chloride 105 03/19/2019 08:32 AM    CO2 29 03/19/2019 08:32 AM    Anion gap 5 03/19/2019 08:32 AM    Glucose 79 03/19/2019 08:32 AM    BUN 18 03/19/2019 08:32 AM    Creatinine 1.09 03/19/2019 08:32 AM    BUN/Creatinine ratio 17 03/19/2019 08:32 AM    GFR est AA >60 03/19/2019 08:32 AM    GFR est non-AA 51 (L) 03/19/2019 08:32 AM    Calcium 8.3 (L) 03/19/2019 08:32 AM         LFT  Lab Results   Component Value Date/Time    ALT (SGPT) 33 03/19/2019 08:32 AM    AST (SGOT) 27 03/19/2019 08:32 AM    Alk.  phosphatase 86 03/19/2019 08:32 AM    Bilirubin, direct 0.2 03/19/2019 08:32 AM    Bilirubin, total 0.8 03/19/2019 08:32 AM         Cholesterol  Lab Results   Component Value Date/Time    Cholesterol, total 116 03/19/2019 08:32 AM    HDL Cholesterol 55 03/19/2019 08:32 AM    LDL, calculated 51.8 03/19/2019 08:32 AM    Triglyceride 46 03/19/2019 08:32 AM    CHOL/HDL Ratio 2.1 03/19/2019 08:32 AM

## 2019-11-15 NOTE — PATIENT INSTRUCTIONS

## 2019-11-15 NOTE — PROGRESS NOTES
Joshua Saeed is a 62 y.o. female (: 1955) presenting to address:    Chief Complaint   Patient presents with    Hypertension    Nasal Discharge       Vitals:    11/15/19 0728   BP: 113/77   Pulse: 80   Resp: 16   Temp: 96.8 °F (36 °C)   TempSrc: Oral   SpO2: 97%   Weight: 184 lb (83.5 kg)   Height: 5' 3.12\" (1.603 m)   PainSc:   6   PainLoc: Ankle       Hearing/Vision:   No exam data present    Learning Assessment:     Learning Assessment 2015   PRIMARY LEARNER Patient   HIGHEST LEVEL OF EDUCATION - PRIMARY LEARNER  2 YEARS OF COLLEGE   BARRIERS PRIMARY LEARNER NONE   CO-LEARNER CAREGIVER No   PRIMARY LANGUAGE ENGLISH   LEARNER PREFERENCE PRIMARY READING   ANSWERED BY self   RELATIONSHIP SELF     Depression Screening:     3 most recent PHQ Screens 11/15/2019   Little interest or pleasure in doing things Not at all   Feeling down, depressed, irritable, or hopeless Not at all   Total Score PHQ 2 0     Fall Risk Assessment:     Fall Risk Assessment, last 12 mths 3/19/2019   Able to walk? Yes   Fall in past 12 months? No   Fall with injury? -   Number of falls in past 12 months -   Fall Risk Score -     Abuse Screening:     Abuse Screening Questionnaire 3/19/2019   Do you ever feel afraid of your partner? N   Are you in a relationship with someone who physically or mentally threatens you? N   Is it safe for you to go home? Y     Coordination of Care Questionaire:   1. Have you been to the ER, urgent care clinic since your last visit? Hospitalized since your last visit? NO    2. Have you seen or consulted any other health care providers outside of the [address] [place] [address] since your last visit? Include any pap smears or colon screening. YES rheumatology     Advanced Directive:   1. Do you have an Advanced Directive? NO    2. Would you like information on Advanced Directives?  NO

## 2019-11-18 ENCOUNTER — TELEPHONE (OUTPATIENT)
Dept: FAMILY MEDICINE CLINIC | Age: 64
End: 2019-11-18

## 2019-11-18 RX ORDER — FAMOTIDINE 40 MG/1
40 TABLET, FILM COATED ORAL DAILY
Qty: 90 TAB | Refills: 1 | Status: SHIPPED | OUTPATIENT
Start: 2019-11-18 | End: 2020-02-13 | Stop reason: RX

## 2019-11-26 DIAGNOSIS — E78.00 HYPERCHOLESTEROLEMIA: ICD-10-CM

## 2019-11-26 RX ORDER — ROSUVASTATIN CALCIUM 10 MG/1
TABLET, COATED ORAL
Qty: 45 TAB | Refills: 2 | Status: CANCELLED | OUTPATIENT
Start: 2019-11-26

## 2019-11-26 RX ORDER — PANTOPRAZOLE SODIUM 40 MG
TABLET, DELAYED RELEASE (ENTERIC COATED) ORAL
Qty: 90 TAB | Refills: 2 | Status: CANCELLED | OUTPATIENT
Start: 2019-11-26

## 2019-11-26 RX ORDER — FAMOTIDINE 40 MG/1
40 TABLET, FILM COATED ORAL DAILY
Qty: 90 TAB | Refills: 1 | Status: CANCELLED | OUTPATIENT
Start: 2019-11-26

## 2019-11-26 NOTE — TELEPHONE ENCOUNTER
Pt called to request for Prior Auth for pepcid, crestor and protonix before December 2nd.  Please advise

## 2019-11-27 NOTE — TELEPHONE ENCOUNTER
Crestor was authorized 02/13/19 to 02/13/20 - prior authorization not needed until 02/2020. Protonix was authorized 01/07/19 to 01/07/20 - prior authorization not needed until 2020. Called [facility] [phone] per the patients request to initiate authorization for the medications. Crestor case number - [id number]  Protonix case number - [id number]  Pepcid case number - [id number]    All three medication required more time for authorization - stated we will be notified via fax in about 72 hours.

## 2019-11-29 NOTE — TELEPHONE ENCOUNTER
Faxes came today authorizing Pepcid 40 mg tab 30 per 30 days 1/1/20-12/31/20, Crestor 10 mg 45 tabs per 90 1/1/20-12/31/20 and Protonix 40 90 per 90 days 1/1/20-12/31/20.

## 2020-01-10 ENCOUNTER — PATIENT OUTREACH (OUTPATIENT)
Dept: FAMILY MEDICINE CLINIC | Age: 65
End: 2020-01-10

## 2020-01-10 NOTE — PROGRESS NOTES
.Complex Case Management Denial       Date/Time:  1/10/2020 11:17 AM    Method of communication with patient:phone    [facility] ( [facility]) contacted the patient by telephone to perform Ambulatory Care Coordination. Verified name and  with patient as identifiers. Provided introduction to self, and explanation of the Ambulatory Care Manager's role. Reviewed most recent clinic visit w/ patient who verbalized understanding. Patient given an opportunity to ask questions. The patient Declines Care Coordination Services at this time. Second time declining CCS . Patient state's she is doing well no ED or hospitalizing noted . Comes to PCP appointments as scheduled, Refill requests on time. Will defer again for 90 days    The patient agrees to contact the PCP office or the [facility] for questions related to their healthcare. [facility] provided contact information for future reference.

## 2020-01-15 RX ORDER — LEVALBUTEROL TARTRATE 45 UG/1
2 AEROSOL, METERED ORAL
Qty: 2 INHALER | Refills: 2 | Status: SHIPPED | OUTPATIENT
Start: 2020-01-15 | End: 2020-12-01

## 2020-01-15 NOTE — TELEPHONE ENCOUNTER
Patient requesting that her inhaler be refilled. Xopenex last ordered 09/18/18 qty 2 inhalers with 2 refills. It was discontinued by Dr. John Paul Loja, but then put in as a historical med.      Last visit 11/15/19  Next scheduled 03/10/2020

## 2020-02-13 RX ORDER — FAMOTIDINE 20 MG/1
40 TABLET, FILM COATED ORAL DAILY
Qty: 180 TAB | Refills: 2 | Status: SHIPPED | OUTPATIENT
Start: 2020-02-13 | End: 2020-08-14

## 2020-03-10 ENCOUNTER — OFFICE VISIT (OUTPATIENT)
Dept: FAMILY MEDICINE CLINIC | Age: 65
End: 2020-03-10

## 2020-03-10 ENCOUNTER — HOSPITAL ENCOUNTER (OUTPATIENT)
Dept: LAB | Age: 65
Discharge: HOME | End: 2020-03-10
Payer: MEDICARE

## 2020-03-10 VITALS
HEART RATE: 67 BPM | HEIGHT: 63 IN | TEMPERATURE: 97.6 F | OXYGEN SATURATION: 100 % | BODY MASS INDEX: 32.25 KG/M2 | WEIGHT: 182 LBS | DIASTOLIC BLOOD PRESSURE: 84 MMHG | SYSTOLIC BLOOD PRESSURE: 140 MMHG | RESPIRATION RATE: 16 BRPM

## 2020-03-10 DIAGNOSIS — Z78.0 POSTMENOPAUSAL: ICD-10-CM

## 2020-03-10 DIAGNOSIS — I50.22 CHRONIC SYSTOLIC CONGESTIVE HEART FAILURE (HCC): ICD-10-CM

## 2020-03-10 DIAGNOSIS — E78.00 HYPERCHOLESTEROLEMIA: Primary | ICD-10-CM

## 2020-03-10 DIAGNOSIS — E78.00 HYPERCHOLESTEROLEMIA: ICD-10-CM

## 2020-03-10 DIAGNOSIS — I10 ESSENTIAL HYPERTENSION: ICD-10-CM

## 2020-03-10 DIAGNOSIS — Z00.00 MEDICARE ANNUAL WELLNESS VISIT, SUBSEQUENT: ICD-10-CM

## 2020-03-10 DIAGNOSIS — E55.9 HYPOVITAMINOSIS D: ICD-10-CM

## 2020-03-10 LAB
ALBUMIN SERPL-MCNC: 3.6 G/DL (ref 3.4–5)
ALBUMIN/GLOB SERPL: 0.9 {RATIO} (ref 0.8–1.7)
ALP SERPL-CCNC: 78 U/L (ref 45–117)
ALT SERPL-CCNC: 30 U/L (ref 13–56)
ANION GAP SERPL CALC-SCNC: 4 MMOL/L (ref 3–18)
APPEARANCE UR: CLEAR
AST SERPL-CCNC: 24 U/L (ref 10–38)
BACTERIA URNS QL MICRO: ABNORMAL /HPF
BASOPHILS # BLD: 0 K/UL (ref 0–0.1)
BASOPHILS NFR BLD: 1 % (ref 0–2)
BILIRUB DIRECT SERPL-MCNC: 0.3 MG/DL (ref 0–0.2)
BILIRUB SERPL-MCNC: 1 MG/DL (ref 0.2–1)
BILIRUB UR QL: NEGATIVE
BUN SERPL-MCNC: 15 MG/DL (ref 7–18)
BUN/CREAT SERPL: 14 (ref 12–20)
CALCIUM SERPL-MCNC: 9.1 MG/DL (ref 8.5–10.1)
CHLORIDE SERPL-SCNC: 103 MMOL/L (ref 100–111)
CHOLEST SERPL-MCNC: 140 MG/DL
CO2 SERPL-SCNC: 31 MMOL/L (ref 21–32)
COLOR UR: YELLOW
CREAT SERPL-MCNC: 1.06 MG/DL (ref 0.6–1.3)
DIFFERENTIAL METHOD BLD: ABNORMAL
EOSINOPHIL # BLD: 0.4 K/UL (ref 0–0.4)
EOSINOPHIL NFR BLD: 12 % (ref 0–5)
EPITH CASTS URNS QL MICRO: ABNORMAL /LPF (ref 0–5)
ERYTHROCYTE [DISTWIDTH] IN BLOOD BY AUTOMATED COUNT: 14.1 % (ref 11.6–14.5)
GLOBULIN SER CALC-MCNC: 4.1 G/DL (ref 2–4)
GLUCOSE SERPL-MCNC: 86 MG/DL (ref 74–99)
GLUCOSE UR STRIP.AUTO-MCNC: NEGATIVE MG/DL
HCT VFR BLD AUTO: 41.3 % (ref 35–45)
HDLC SERPL-MCNC: 55 MG/DL (ref 40–60)
HDLC SERPL: 2.5 {RATIO} (ref 0–5)
HGB BLD-MCNC: 13.3 G/DL (ref 12–16)
HGB UR QL STRIP: NEGATIVE
KETONES UR QL STRIP.AUTO: NEGATIVE MG/DL
LDLC SERPL CALC-MCNC: 72.4 MG/DL (ref 0–100)
LEUKOCYTE ESTERASE UR QL STRIP.AUTO: ABNORMAL
LIPID PROFILE,FLP: NORMAL
LYMPHOCYTES # BLD: 1 K/UL (ref 0.9–3.6)
LYMPHOCYTES NFR BLD: 30 % (ref 21–52)
MCH RBC QN AUTO: 28.5 PG (ref 24–34)
MCHC RBC AUTO-ENTMCNC: 32.2 G/DL (ref 31–37)
MCV RBC AUTO: 88.6 FL (ref 74–97)
MONOCYTES # BLD: 0.6 K/UL (ref 0.05–1.2)
MONOCYTES NFR BLD: 17 % (ref 3–10)
MUCOUS THREADS URNS QL MICRO: ABNORMAL /LPF
NEUTS SEG # BLD: 1.4 K/UL (ref 1.8–8)
NEUTS SEG NFR BLD: 40 % (ref 40–73)
NITRITE UR QL STRIP.AUTO: POSITIVE
PH UR STRIP: 5.5 [PH] (ref 5–8)
PLATELET # BLD AUTO: 209 K/UL (ref 135–420)
PMV BLD AUTO: 11.1 FL (ref 9.2–11.8)
POTASSIUM SERPL-SCNC: 4.5 MMOL/L (ref 3.5–5.5)
PROT SERPL-MCNC: 7.7 G/DL (ref 6.4–8.2)
PROT UR STRIP-MCNC: NEGATIVE MG/DL
RBC # BLD AUTO: 4.66 M/UL (ref 4.2–5.3)
RBC #/AREA URNS HPF: ABNORMAL /HPF (ref 0–5)
SODIUM SERPL-SCNC: 138 MMOL/L (ref 136–145)
SP GR UR REFRACTOMETRY: 1.01 (ref 1–1.03)
T4 FREE SERPL-MCNC: 1.2 NG/DL (ref 0.7–1.5)
TRIGL SERPL-MCNC: 63 MG/DL (ref ?–150)
TSH SERPL DL<=0.05 MIU/L-ACNC: 1.05 UIU/ML (ref 0.36–3.74)
UROBILINOGEN UR QL STRIP.AUTO: 0.2 EU/DL (ref 0.2–1)
VLDLC SERPL CALC-MCNC: 12.6 MG/DL
WBC # BLD AUTO: 3.3 K/UL (ref 4.6–13.2)
WBC URNS QL MICRO: ABNORMAL /HPF (ref 0–4)

## 2020-03-10 PROCEDURE — 80048 BASIC METABOLIC PNL TOTAL CA: CPT

## 2020-03-10 PROCEDURE — 84443 ASSAY THYROID STIM HORMONE: CPT

## 2020-03-10 PROCEDURE — 84439 ASSAY OF FREE THYROXINE: CPT

## 2020-03-10 PROCEDURE — 80076 HEPATIC FUNCTION PANEL: CPT

## 2020-03-10 PROCEDURE — 80061 LIPID PANEL: CPT

## 2020-03-10 PROCEDURE — 81001 URINALYSIS AUTO W/SCOPE: CPT

## 2020-03-10 PROCEDURE — 85025 COMPLETE CBC W/AUTO DIFF WBC: CPT

## 2020-03-10 PROCEDURE — 36415 COLL VENOUS BLD VENIPUNCTURE: CPT

## 2020-03-10 RX ORDER — ECONAZOLE NITRATE 10 MG/G
CREAM TOPICAL 2 TIMES DAILY
Qty: 30 G | Refills: 1 | Status: SHIPPED | OUTPATIENT
Start: 2020-03-10

## 2020-03-10 RX ORDER — AZITHROMYCIN 250 MG/1
TABLET, FILM COATED ORAL
Qty: 6 TAB | Refills: 0 | Status: SHIPPED | OUTPATIENT
Start: 2020-03-10 | End: 2020-03-15

## 2020-03-10 NOTE — PATIENT INSTRUCTIONS
Medicare Wellness Visit, Female The best way to live healthy is to have a lifestyle where you eat a well-balanced diet, exercise regularly, limit alcohol use, and quit all forms of tobacco/nicotine, if applicable. Regular preventive services are another way to keep healthy. Preventive services (vaccines, screening tests, monitoring & exams) can help personalize your care plan, which helps you manage your own care. Screening tests can find health problems at the earliest stages, when they are easiest to treat. [facility] follows the current, evidence-based guidelines published by the [facility] ([facility]) when recommending preventive services for our patients. Because we follow these guidelines, sometimes recommendations change over time as research supports it. (For example, mammograms used to be recommended annually. Even though Medicare will still pay for an annual mammogram, the newer guidelines recommend a mammogram every two years for women of average risk). Of course, you and your doctor may decide to screen more often for some diseases, based on your risk and your co-morbidities (chronic disease you are already diagnosed with). Preventive services for you include: - Medicare offers their members a free annual wellness visit, which is time for you and your primary care provider to discuss and plan for your preventive service needs. Take advantage of this benefit every year! 
-All adults over the age of 72 should receive the recommended pneumonia vaccines. Current USPSTF guidelines recommend a series of two vaccines for the best pneumonia protection.  
-All adults should have a flu vaccine yearly and a tetanus vaccine every 10 years.  
-All adults age 48 and older should receive the shingles vaccines (series of two vaccines). -All adults age 38-68 who are overweight should have a diabetes screening test once every three years. -All adults born between [birth year] and 1965 should be screened once for Hepatitis C. 
-Other screening tests and preventive services for persons with diabetes include: an eye exam to screen for diabetic retinopathy, a kidney function test, a foot exam, and stricter control over your cholesterol.  
-Cardiovascular screening for adults with routine risk involves an electrocardiogram (ECG) at intervals determined by your doctor.  
-Colorectal cancer screenings should be done for adults age 54-65 with no increased risk factors for colorectal cancer. There are a number of acceptable methods of screening for this type of cancer. Each test has its own benefits and drawbacks. Discuss with your doctor what is most appropriate for you during your annual wellness visit. The different tests include: colonoscopy (considered the best screening method), a fecal occult blood test, a fecal DNA test, and sigmoidoscopy. 
 
-A bone mass density test is recommended when a woman turns 65 to screen for osteoporosis. This test is only recommended one time, as a screening. Some providers will use this same test as a disease monitoring tool if you already have osteoporosis. -Breast cancer screenings are recommended every other year for women of normal risk, age 54-69. 
-Cervical cancer screenings for women over age 72 are only recommended with certain risk factors. Here is a list of your current Health Maintenance items (your personalized list of preventive services) with a due date: 
Health Maintenance Due Topic Date Due  
 Annual Well Visit  03/19/2020  Cholesterol Test   03/19/2020

## 2020-03-10 NOTE — PROGRESS NOTES
Pancho Graham is a 62 y.o. female (: 1955) presenting to address:    Chief Complaint   Patient presents with    Nasal Congestion     alot of mucus . yellowish green started 2 to 3 week     Nasal Discharge    Annual Wellness Visit    Skin Problem     given econazole nitrate 1% cream        Vitals:    03/10/20 0812   BP: (!) 145/95   Pulse: 67   Resp: 16   Temp: 97.6 °F (36.4 °C)   TempSrc: Oral   SpO2: 100%   Weight: 182 lb (82.6 kg)   Height: 5' 3\" (1.6 m)   PainSc:   0 - No pain       Hearing/Vision:      Visual Acuity Screening    Right eye Left eye Both eyes   Without correction: 20/40 20/50 20/25   With correction:          Learning Assessment:     Learning Assessment 2015   PRIMARY LEARNER Patient   HIGHEST LEVEL OF EDUCATION - PRIMARY LEARNER  2 YEARS OF COLLEGE   BARRIERS PRIMARY LEARNER NONE   CO-LEARNER CAREGIVER No   PRIMARY LANGUAGE ENGLISH   LEARNER PREFERENCE PRIMARY READING   ANSWERED BY self   RELATIONSHIP SELF     Depression Screening:     3 most recent PHQ Screens 3/10/2020   Little interest or pleasure in doing things Not at all   Feeling down, depressed, irritable, or hopeless Not at all   Total Score PHQ 2 0     Fall Risk Assessment:     Fall Risk Assessment, last 12 mths 3/10/2020   Able to walk? Yes   Fall in past 12 months? Yes   Fall with injury? No   Number of falls in past 12 months 2   Fall Risk Score 2     Abuse Screening:     Abuse Screening Questionnaire 3/19/2019   Do you ever feel afraid of your partner? N   Are you in a relationship with someone who physically or mentally threatens you? N   Is it safe for you to go home? Y     Coordination of Care Questionaire:   1. Have you been to the ER, urgent care clinic since your last visit? Hospitalized since your last visit? NO    2. Have you seen or consulted any other health care providers outside of the [address] [place] [address] since your last visit? Include any pap smears or colon screening.  NO    Advanced Directive:   1. Do you have an Advanced Directive? NO    2. Would you like information on Advanced Directives?  NO

## 2020-03-10 NOTE — PROGRESS NOTES
This is the Subsequent Medicare Annual Wellness Exam, performed 12 months or more after the Initial AWV or the last Subsequent AWV    I have reviewed the patient's medical history in detail and updated the computerized patient record. History     Patient Active Problem List   Diagnosis Code    Arthritis M19.90    CAD (coronary artery disease) I25.10    GERD (gastroesophageal reflux disease) K21.9    Sarcoidosis D86.9    Ganglion cyst of wrist M67.439    Posterior vitreous detachment H43.819    Hypercholesterolemia E78.00    Urinary incontinence R32    Allergy, unspecified not elsewhere classified T78.40XA    CHF (congestive heart failure) ([facility]) I50.9    HTN (hypertension) I10    Atlanto-axial subluxation S13.120A    RAD (reactive airway disease) J45.909    Advance care planning Z71.89     Past Medical History:   Diagnosis Date    Advance care planning 2/17/2016    Discussed with pt today and handout given to pt to complete.  Allergy, unspecified not elsewhere classified 6/26/2010    Arthritis     Atlanto-axial subluxation 3/13/2015    CAD (coronary artery disease)     CHF (congestive heart failure) ([facility]) 1/2/2014    Ganglion cyst of wrist     GERD (gastroesophageal reflux disease)     HTN (hypertension) 2/24/2015    Hypercholesterolemia     high LDL    Hypertension     Posterior vitreous detachment     RAD (reactive airway disease) 11/9/2015    Sarcoidosis     Urinary incontinence       No past surgical history on file. Current Outpatient Medications   Medication Sig Dispense Refill    famotidine (PEPCID) 20 mg tablet Take 2 Tabs by mouth daily. (Patient taking differently: Take 40 mg by mouth nightly.) 180 Tab 2    levalbuterol tartrate (XOPENEX HFA) 45 mcg/actuation inhaler Take 2 Puffs by inhalation every six (6) hours as needed for Wheezing.  Indications: bronchospasm prevention 2 Inhaler 2    furosemide (LASIX) 20 mg tablet TAKE 1 TABLET BY MOUTH ONCE DAILY 90 Tab 2    PROTONIX 40 mg tablet TAKE 1 TABLET BY MOUTH ONCE DAILY 90 Tab 1    potassium chloride (K-DUR, KLOR-CON) 20 mEq tablet TAKE 1 TABLET BY MOUTH ONCE DAILY 90 Tab 2    rosuvastatin (CRESTOR) 10 mg tablet TAKE 1/2 (ONE-HALF) TABLET BY MOUTH ONCE DAILY IN THE EVENING 45 Tab 2    carvedilol (COREG) 25 mg tablet TAKE 1 TABLET BY MOUTH IN THE MORNING AND 1/2 (ONE-HALF) IN THE EVENING WITH MEALS 135 Tab 2    fesoterodine (TOVIAZ) 8 mg ER tablet TAKE 1 TABLET BY MOUTH ONCE DAILY 90 Tab 2    ammonium lactate (AMLACTIN) 12 % topical cream APPLY CREAM TOPICALLY TO AFFECTED AREA TWICE DAILY. (RUB IN AFFECTED AREA WELL) 385 g 2    meloxicam (MOBIC) 15 mg tablet TAKE ONE TABLET BY MOUTH ONCE DAILY 90 Tab 1    Calcium-Cholecalciferol, D3, (CALCIUM 600 WITH VITAMIN D3) 600 mg(1,500mg) -400 unit chew Take 1,200 mg by mouth every other day.  loratadine (CLARITIN) 10 mg tablet Take 10 mg by mouth.  fluticasone (FLONASE) 50 mcg/actuation nasal spray 2 Sprays by Both Nostrils route daily. 3 Bottle 3    nitroglycerin (NITROSTAT) 0.4 mg SL tablet DISSOLVE ONE TABLET UNDER THE TONGUE EVERY 5 MINUTES AS NEEDED FOR CHEST PAIN. DO NOT EXCEED A TOTAL OF 3 DOSES IN 15 MINUTES 50 Tab 0    VOLTAREN 1 % gel APPLY 4G TO AFFECTED AREA FOUR TIMES DAILY 400 g 2    BIOTIN PO Take  by mouth daily.  cholecalciferol, vitamin d3, (VITAMIN D) 1,000 unit tablet Take 2,000 Units by mouth daily.  aspirin delayed-release 81 mg tablet Take 81 mg by mouth daily.  multivitamin (ONE A DAY) tablet Take 1 Tab by mouth daily.  DOCOSAHEXANOIC ACID/EPA (FISH OIL PO) Take  by mouth daily. 4 tabs daily.  cyclosporin (RESTASIS) 0.05 % ophthalmic emulsion Administer 1 Drop to both eyes two (2) times a day.  meloxicam (MOBIC) 15 mg tablet TAKE 1 TABLET BY MOUTH ONCE DAILY 90 Tab 1    valsartan (DIOVAN) 80 mg tablet Take  by mouth daily.       nitrofurantoin, macrocrystal-monohydrate, (MACROBID) 100 mg capsule Take 1 Cap by mouth two (2) times a day. 14 Cap 0     Allergies   Allergen Reactions    Asa-Acetaminophen-Caff-Potass Other (comments)     GI upset    Bextra [Valdecoxib] Other (comments)     GI upset    Celebrex [Celecoxib] Other (comments)     GI upset    Naproxen Rash    Pcn [Penicillins] Swelling    Sulfur Rash       No family history on file. Social History     Tobacco Use    Smoking status: Former Smoker     Last attempt to quit: 1986     Years since quittin.6    Smokeless tobacco: Former User   Substance Use Topics    Alcohol use: No       Depression Risk Factor Screening:     3 most recent PHQ Screens 3/10/2020   Little interest or pleasure in doing things Not at all   Feeling down, depressed, irritable, or hopeless Not at all   Total Score PHQ 2 0       Alcohol Risk Factor Screening:   Do you average 1 drink per night or more than 7 drinks a week:  No    On any one occasion in the past three months have you have had more than 3 drinks containing alcohol:  No      Functional Ability and Level of Safety:   Hearing: Hearing is good. Activities of Daily Living: The home contains: no safety equipment. Patient does total self care    Ambulation: with no difficulty    Fall Risk:  Fall Risk Assessment, last 12 mths 3/10/2020   Able to walk? Yes   Fall in past 12 months? Yes   Fall with injury? No   Number of falls in past 12 months 2   Fall Risk Score 2       Abuse Screen:  Patient is not abused    Cognitive Screening   Has your family/caregiver stated any concerns about your memory: no      Patient Care Team   Patient Care Team:  Jarett Gonzales MD as PCP - General  Jarett Gonzales MD as PCP - [facility] Empaneled Provider  Zora Ruff MD as Physician (Rheumatology)    Assessment/Plan   Education and counseling provided:  Are appropriate based on today's review and evaluation    Diagnoses and all orders for this visit:    1. Medicare annual wellness visit, subsequent    2. Hypercholesterolemia    3.  Essential hypertension    4. Hypovitaminosis D    5.  Chronic systolic congestive heart failure [facility])        Health Maintenance Due   Topic Date Due    Medicare Yearly Exam  03/19/2020    Lipid Screen  03/19/2020

## 2020-03-10 NOTE — PROGRESS NOTES
Assessment/Plan:    *Diagnoses and all orders for this visit:    1. Hypercholesterolemia    2. Medicare annual wellness visit, subsequent    3. Essential hypertension    4. Hypovitaminosis D    5. Chronic systolic congestive heart failure ([facility] [address]    6. Postmenopausal  -     DEXA BONE DENSITY STUDY AXIAL; Future    Other orders  -     azithromycin (ZITHROMAX) 250 mg tablet; Take 2 tablets today, then take 1 tablet daily  -     econazole nitrate (SPECTAZOLE) 1 % topical cream; Apply  to affected area two (2) times a day. Using econazole on elbow rash. Zpack for sinusitis. F/u 6 months. The plan was discussed with the patient. The patient verbalized understanding and is in agreement with the plan. All medication potential side effects were discussed with the patient.    -------------------------------------------------------------------------------------------------------------------        Chuyita Bennett is a 62 y.o. female and presents with Nasal Congestion (alot of mucus . yellowish green started 2 to 3 week ); Nasal Discharge; Annual Wellness Visit; and Skin Problem (given econazole nitrate 1% cream )         Subjective:  Pt here for f/u. HLD:  Will await results of labs done today. HTN:  At goal.    Chronic CHF:  Stable. Had some palpitations over the weekend but is fine now. Was moving some heavy things, helping daughter to move. Is also worried about an upcoming trip that she has. Has not had any palpitations since. Has appt with cardiology this week. Review of Systems - General ROS: negative         The problem list was updated as a part of today's visit.   Patient Active Problem List   Diagnosis Code    Arthritis M19.90    CAD (coronary artery disease) I25.10    GERD (gastroesophageal reflux disease) K21.9    Sarcoidosis D86.9    Ganglion cyst of wrist M67.439    Posterior vitreous detachment H43.819    Hypercholesterolemia E78.00    Urinary incontinence R32    Allergy, unspecified not elsewhere classified T78.40XA    CHF (congestive heart failure) ([facility]) I50.9    HTN (hypertension) I10    Atlanto-axial subluxation S13.120A    RAD (reactive airway disease) J45.909    Advance care planning Z71.89       The PSH, FH were reviewed. SH:  Social History     Tobacco Use    Smoking status: Former Smoker     Last attempt to quit: 1986     Years since quittin.6    Smokeless tobacco: Former User   Substance Use Topics    Alcohol use: No    Drug use: No       Medications/Allergies:  Current Outpatient Medications on File Prior to Visit   Medication Sig Dispense Refill    famotidine (PEPCID) 20 mg tablet Take 2 Tabs by mouth daily. (Patient taking differently: Take 40 mg by mouth nightly.) 180 Tab 2    levalbuterol tartrate (XOPENEX HFA) 45 mcg/actuation inhaler Take 2 Puffs by inhalation every six (6) hours as needed for Wheezing. Indications: bronchospasm prevention 2 Inhaler 2    furosemide (LASIX) 20 mg tablet TAKE 1 TABLET BY MOUTH ONCE DAILY 90 Tab 2    PROTONIX 40 mg tablet TAKE 1 TABLET BY MOUTH ONCE DAILY 90 Tab 1    potassium chloride (K-DUR, KLOR-CON) 20 mEq tablet TAKE 1 TABLET BY MOUTH ONCE DAILY 90 Tab 2    rosuvastatin (CRESTOR) 10 mg tablet TAKE 1/2 (ONE-HALF) TABLET BY MOUTH ONCE DAILY IN THE EVENING 45 Tab 2    carvedilol (COREG) 25 mg tablet TAKE 1 TABLET BY MOUTH IN THE MORNING AND 1/2 (ONE-HALF) IN THE EVENING WITH MEALS 135 Tab 2    fesoterodine (TOVIAZ) 8 mg ER tablet TAKE 1 TABLET BY MOUTH ONCE DAILY 90 Tab 2    ammonium lactate (AMLACTIN) 12 % topical cream APPLY CREAM TOPICALLY TO AFFECTED AREA TWICE DAILY. (RUB IN AFFECTED AREA WELL) 385 g 2    meloxicam (MOBIC) 15 mg tablet TAKE ONE TABLET BY MOUTH ONCE DAILY 90 Tab 1    Calcium-Cholecalciferol, D3, (CALCIUM 600 WITH VITAMIN D3) 600 mg(1,500mg) -400 unit chew Take 1,200 mg by mouth every other day.  loratadine (CLARITIN) 10 mg tablet Take 10 mg by mouth.       fluticasone (FLONASE) 50 mcg/actuation nasal spray 2 Sprays by Both Nostrils route daily. 3 Bottle 3    nitroglycerin (NITROSTAT) 0.4 mg SL tablet DISSOLVE ONE TABLET UNDER THE TONGUE EVERY 5 MINUTES AS NEEDED FOR CHEST PAIN. DO NOT EXCEED A TOTAL OF 3 DOSES IN 15 MINUTES 50 Tab 0    VOLTAREN 1 % gel APPLY 4G TO AFFECTED AREA FOUR TIMES DAILY 400 g 2    BIOTIN PO Take  by mouth daily.  cholecalciferol, vitamin d3, (VITAMIN D) 1,000 unit tablet Take 2,000 Units by mouth daily.  aspirin delayed-release 81 mg tablet Take 81 mg by mouth daily.  multivitamin (ONE A DAY) tablet Take 1 Tab by mouth daily.  DOCOSAHEXANOIC ACID/EPA (FISH OIL PO) Take  by mouth daily. 4 tabs daily.  cyclosporin (RESTASIS) 0.05 % ophthalmic emulsion Administer 1 Drop to both eyes two (2) times a day.  meloxicam (MOBIC) 15 mg tablet TAKE 1 TABLET BY MOUTH ONCE DAILY 90 Tab 1    valsartan (DIOVAN) 80 mg tablet Take  by mouth daily.  nitrofurantoin, macrocrystal-monohydrate, (MACROBID) 100 mg capsule Take 1 Cap by mouth two (2) times a day. 14 Cap 0     No current facility-administered medications on file prior to visit.          Allergies   Allergen Reactions    Asa-Acetaminophen-Caff-Potass Other (comments)     GI upset    Bextra [Valdecoxib] Other (comments)     GI upset    Celebrex [Celecoxib] Other (comments)     GI upset    Naproxen Rash    Pcn [Penicillins] Swelling    Sulfur Rash         Health Maintenance:   Health Maintenance   Topic Date Due    Lipid Screen  03/19/2020    Influenza Age 5 to Adult  11/16/2020 (Originally 8/1/2019)    Medicare Yearly Exam  03/11/2021    Breast Cancer Screen Mammogram  03/29/2021    Bone Densitometry (Dexa) Screening  04/12/2021    DTaP/Tdap/Td series (2 - Td) 02/24/2025    Colonoscopy  05/31/2026    Hepatitis C Screening  Completed    Shingrix Vaccine Age 50>  Completed    Pneumococcal 0-64 years  Completed       Objective:  Visit Vitals  /84 Pulse 67   Temp 97.6 °F (36.4 °C) (Oral)   Resp 16   Ht 5' 3\" (1.6 m)   Wt 182 lb (82.6 kg)   SpO2 100%   BMI 32.24 kg/m²          Nurses notes and VS reviewed. Physical Examination: General appearance - alert, well appearing, and in no distress  Ears - ceruminosis noted  Nose - mucosal congestion and mucosal erythema  Mouth - mucous membranes moist, pharynx normal without lesions  Neck - supple, no significant adenopathy  Chest - clear to auscultation, no wheezes, rales or rhonchi, symmetric air entry  Heart - normal rate, regular rhythm, normal S1, S2, no murmurs, rubs, clicks or gallops  Abdomen - soft, nontender, nondistended, no masses or organomegaly  Musculoskeletal - no joint tenderness, deformity or swelling  Extremities - peripheral pulses normal, no pedal edema, no clubbing or cyanosis          Labwork and Ancillary Studies:    CBC w/Diff  Lab Results   Component Value Date/Time    WBC 3.2 (L) 03/19/2019 08:32 AM    HGB 11.5 (L) 03/19/2019 08:32 AM    PLATELET 989 15/60/5217 08:32 AM         Basic Metabolic Profile  Lab Results   Component Value Date/Time    Sodium 139 03/19/2019 08:32 AM    Potassium 4.3 03/19/2019 08:32 AM    Chloride 105 03/19/2019 08:32 AM    CO2 29 03/19/2019 08:32 AM    Anion gap 5 03/19/2019 08:32 AM    Glucose 79 03/19/2019 08:32 AM    BUN 18 03/19/2019 08:32 AM    Creatinine 1.09 03/19/2019 08:32 AM    BUN/Creatinine ratio 17 03/19/2019 08:32 AM    GFR est AA >60 03/19/2019 08:32 AM    GFR est non-AA 51 (L) 03/19/2019 08:32 AM    Calcium 8.3 (L) 03/19/2019 08:32 AM         LFT  Lab Results   Component Value Date/Time    ALT (SGPT) 33 03/19/2019 08:32 AM    AST (SGOT) 27 03/19/2019 08:32 AM    Alk.  phosphatase 86 03/19/2019 08:32 AM    Bilirubin, direct 0.2 03/19/2019 08:32 AM    Bilirubin, total 0.8 03/19/2019 08:32 AM         Cholesterol  Lab Results   Component Value Date/Time    Cholesterol, total 116 03/19/2019 08:32 AM    HDL Cholesterol 55 03/19/2019 08:32 AM    LDL, calculated 51.8 03/19/2019 08:32 AM    Triglyceride 46 03/19/2019 08:32 AM    CHOL/HDL Ratio 2.1 03/19/2019 08:32 AM

## 2020-03-12 RX ORDER — NITROFURANTOIN 25; 75 MG/1; MG/1
100 CAPSULE ORAL 2 TIMES DAILY
Qty: 14 CAP | Refills: 0 | Status: SHIPPED | OUTPATIENT
Start: 2020-03-12 | End: 2020-06-29 | Stop reason: ALTCHOICE

## 2020-03-23 ENCOUNTER — TELEPHONE (OUTPATIENT)
Dept: FAMILY MEDICINE CLINIC | Age: 65
End: 2020-03-23

## 2020-03-23 NOTE — TELEPHONE ENCOUNTER
Patient called and stated that she has a mammogram scheduled for 03/30/2020 and she wants to know if Dr. Daniels Knows thinks she should keep this visit scheduled with everything that is going on. Please advise.

## 2020-05-14 NOTE — TELEPHONE ENCOUNTER
Pt called stating that the pharmacy reached out to us about a week ago or more for a refill. Pt was informed that I only saw the request for today. Pt was informed that I would let the provider know.  Please advise    Requested Prescriptions     Pending Prescriptions Disp Refills    famotidine (PEPCID) 40 mg tablet [Pharmacy Med Name: Famotidine 40 MG Oral Tablet] 90 Tab 0     Sig: Take 1 tablet by mouth once daily

## 2020-05-15 RX ORDER — FAMOTIDINE 40 MG/1
TABLET, FILM COATED ORAL
Qty: 90 TAB | Refills: 0 | OUTPATIENT
Start: 2020-05-15

## 2020-05-15 NOTE — TELEPHONE ENCOUNTER
Last seen 3/10/20    Last filled 2/13/20 qty 180 w/ 2 refills       Called pharmacy they have refills on file no rx is needed again.

## 2020-05-15 NOTE — TELEPHONE ENCOUNTER
Please call pt and let her know that she already has 2 more refills on her Rx.  [facility] should not be requesting a new Rx from us for pepcid.

## 2020-06-15 ENCOUNTER — VIRTUAL VISIT (OUTPATIENT)
Dept: FAMILY MEDICINE CLINIC | Age: 65
End: 2020-06-15

## 2020-06-15 DIAGNOSIS — R09.81 SINUS CONGESTION: ICD-10-CM

## 2020-06-15 DIAGNOSIS — J01.00 ACUTE NON-RECURRENT MAXILLARY SINUSITIS: Primary | ICD-10-CM

## 2020-06-15 DIAGNOSIS — R35.0 URINARY FREQUENCY: ICD-10-CM

## 2020-06-15 RX ORDER — AZITHROMYCIN 250 MG/1
TABLET, FILM COATED ORAL
Qty: 6 TAB | Refills: 0 | Status: SHIPPED | OUTPATIENT
Start: 2020-06-15 | End: 2020-06-20

## 2020-06-15 RX ORDER — FLUTICASONE PROPIONATE 50 MCG
2 SPRAY, SUSPENSION (ML) NASAL DAILY
Qty: 3 BOTTLE | Refills: 2 | Status: SHIPPED | OUTPATIENT
Start: 2020-06-15

## 2020-06-15 NOTE — PROGRESS NOTES
Brandt Anderson is a 60 y.o. female who was seen by synchronous (real-time) audio-video technology on 6/15/2020. Consent: Brandt Anderson, who was seen by synchronous (real-time) audio-video technology, and/or her healthcare decision maker, is aware that this patient-initiated, Telehealth encounter on 6/15/2020 is a billable service, with coverage as determined by her insurance carrier. She is aware that she may receive a bill and has provided verbal consent to proceed: Yes. Assessment & Plan:   Diagnoses and all orders for this visit:    1. Acute non-recurrent maxillary sinusitis  -     azithromycin (ZITHROMAX) 250 mg tablet; Take 2 tablets today, then take 1 tablet daily    2. Sinus congestion  -     fluticasone propionate (FLONASE) 50 mcg/actuation nasal spray; 2 Sprays by Both Nostrils route daily. 3. Urinary frequency        Routine visit in Sept.    Patient will call back if bladder still gives her trouble after taking above Abx. Not certain that it is a UTI but will monitor. 712  Subjective:   Brandt Anderson is a 60 y.o. female who was seen for Sinus Infection      Pt is having sinus drainage, thick nasal discharge, just like she has had with prior sinus infections. She has also had some discomfort in her back and urinary frequency. Denies any dysuria or fevers. Prior to Admission medications    Medication Sig Start Date End Date Taking? Authorizing Provider   fluticasone propionate (FLONASE) 50 mcg/actuation nasal spray 2 Sprays by Both Nostrils route daily.  6/15/20  Yes Tad Dozier MD   azithromycin [facility]) 250 mg tablet Take 2 tablets today, then take 1 tablet daily 6/15/20 6/20/20 Yes Tad Dozier MD   fesoterodine [facility]) 8 mg ER tablet Take 1 tablet by mouth once daily 4/15/20   Tad Dozier MD   meloxicam [facility]) 15 mg tablet Take 1 tablet by mouth once daily 3/19/20   Tad Dozier MD   nitrofurantoin, macrocrystal-monohydrate, (Macrobid) 100 mg capsule Take 1 Cap by mouth two (2) times a day. 3/12/20   True Ventura MD   econazole nitrate (SPECTAZOLE) 1 % topical cream Apply  to affected area two (2) times a day. 3/10/20   True Ventura MD   famotidine (PEPCID) 20 mg tablet Take 2 Tabs by mouth daily. Patient taking differently: Take 40 mg by mouth nightly. 2/13/20   Treu Ventura MD   [facility] tartrate [facility]) 45 mcg/actuation inhaler Take 2 Puffs by inhalation every six (6) hours as needed for Wheezing. Indications: bronchospasm prevention 1/15/20   True Ventura MD   furosemide (LASIX) 20 mg tablet TAKE 1 TABLET BY MOUTH ONCE DAILY 1/14/20   True Ventura MD   PROTONIX 40 mg tablet TAKE 1 TABLET BY MOUTH ONCE DAILY 1/3/20   True Ventura MD   potassium chloride (K-DUR, KLOR-CON) 20 mEq tablet TAKE 1 TABLET BY MOUTH ONCE DAILY 11/26/19   True Ventura MD   rosuvastatin (CRESTOR) 10 mg tablet TAKE 1/2 (ONE-HALF) TABLET BY MOUTH ONCE DAILY IN THE EVENING 10/2/19   True Ventura MD   carvedilol (COREG) 25 mg tablet TAKE 1 TABLET BY MOUTH IN THE MORNING AND 1/2 (ONE-HALF) IN THE EVENING WITH MEALS 8/2/19   True Ventura MD   ammonium lactate (AMLACTIN) 12 % topical cream APPLY CREAM TOPICALLY TO AFFECTED AREA TWICE DAILY. (RUB IN AFFECTED AREA WELL) 7/17/19   True Ventura MD   valsartan (DIOVAN) 80 mg tablet Take  by mouth daily. Provider, Historical   meloxicam (MOBIC) 15 mg tablet TAKE ONE TABLET BY MOUTH ONCE DAILY 12/31/18   True Ventura MD   Calcium-Cholecalciferol, D3, (CALCIUM 600 WITH VITAMIN D3) 600 mg(1,500mg) -400 unit chew Take 1,200 mg by mouth every other day. Provider, Historical   loratadine (CLARITIN) 10 mg tablet Take 10 mg by mouth. Provider, Historical   nitroglycerin (NITROSTAT) 0.4 mg SL tablet DISSOLVE ONE TABLET UNDER THE TONGUE EVERY 5 MINUTES AS NEEDED FOR CHEST PAIN.   DO NOT EXCEED A TOTAL OF 3 DOSES IN 15 MINUTES 9/21/17   True Ventura MD   fluticasone [facility]) 50 mcg/actuation nasal spray 2 Sprays by Both Nostrils route daily. 9/21/17 6/15/20  Eulalia Stearns MD   VOLTAREN 1 % gel APPLY 4G TO AFFECTED AREA FOUR TIMES DAILY 9/29/16   Eulalia Stearns MD   BIOTIN PO Take  by mouth daily. Provider, Historical   cholecalciferol, vitamin d3, (VITAMIN D) 1,000 unit tablet Take 2,000 Units by mouth daily. Provider, Historical   aspirin delayed-release 81 mg tablet Take 81 mg by mouth daily. Provider, Historical   multivitamin (ONE A DAY) tablet Take 1 Tab by mouth daily. Provider, Historical   DOCOSAHEXANOIC ACID/EPA (FISH OIL PO) Take  by mouth daily. 4 tabs daily. 4/9/10   Provider, Historical   cyclosporin (RESTASIS) 0.05 % ophthalmic emulsion Administer 1 Drop to both eyes two (2) times a day. Provider, Historical     Allergies   Allergen Reactions    Asa-Acetaminophen-Caff-Potass Other (comments)     GI upset    Bextra [Valdecoxib] Other (comments)     GI upset    Celebrex [Celecoxib] Other (comments)     GI upset    Naproxen Rash    Pcn [Penicillins] Swelling    Sulfur Rash       Patient Active Problem List   Diagnosis Code    Arthritis M19.90    CAD (coronary artery disease) I25.10    GERD (gastroesophageal reflux disease) K21.9    Sarcoidosis D86.9    Ganglion cyst of wrist M67.439    Posterior vitreous detachment H43.819    Hypercholesterolemia E78.00    Urinary incontinence R32    Allergy, unspecified not elsewhere classified T78.40XA    CHF (congestive heart failure) ([facility]) I50.9    HTN (hypertension) I10    Atlanto-axial subluxation S13.120A    RAD (reactive airway disease) J45.909    Advance care planning Z71.89     Current Outpatient Medications   Medication Sig Dispense Refill    fluticasone propionate (FLONASE) 50 mcg/actuation nasal spray 2 Sprays by Both Nostrils route daily.  3 Bottle 2    azithromycin (ZITHROMAX) 250 mg tablet Take 2 tablets today, then take 1 tablet daily 6 Tab 0    fesoterodine (Toviaz) 8 mg ER tablet Take 1 tablet by mouth once daily 90 Tab 1  meloxicam (MOBIC) 15 mg tablet Take 1 tablet by mouth once daily 90 Tab 1    nitrofurantoin, macrocrystal-monohydrate, (Macrobid) 100 mg capsule Take 1 Cap by mouth two (2) times a day. 14 Cap 0    econazole nitrate (SPECTAZOLE) 1 % topical cream Apply  to affected area two (2) times a day. 30 g 1    famotidine (PEPCID) 20 mg tablet Take 2 Tabs by mouth daily. (Patient taking differently: Take 40 mg by mouth nightly.) 180 Tab 2    levalbuterol tartrate (XOPENEX HFA) 45 mcg/actuation inhaler Take 2 Puffs by inhalation every six (6) hours as needed for Wheezing. Indications: bronchospasm prevention 2 Inhaler 2    furosemide (LASIX) 20 mg tablet TAKE 1 TABLET BY MOUTH ONCE DAILY 90 Tab 2    PROTONIX 40 mg tablet TAKE 1 TABLET BY MOUTH ONCE DAILY 90 Tab 1    potassium chloride (K-DUR, KLOR-CON) 20 mEq tablet TAKE 1 TABLET BY MOUTH ONCE DAILY 90 Tab 2    rosuvastatin (CRESTOR) 10 mg tablet TAKE 1/2 (ONE-HALF) TABLET BY MOUTH ONCE DAILY IN THE EVENING 45 Tab 2    carvedilol (COREG) 25 mg tablet TAKE 1 TABLET BY MOUTH IN THE MORNING AND 1/2 (ONE-HALF) IN THE EVENING WITH MEALS 135 Tab 2    ammonium lactate (AMLACTIN) 12 % topical cream APPLY CREAM TOPICALLY TO AFFECTED AREA TWICE DAILY. (RUB IN AFFECTED AREA WELL) 385 g 2    valsartan (DIOVAN) 80 mg tablet Take  by mouth daily.  meloxicam (MOBIC) 15 mg tablet TAKE ONE TABLET BY MOUTH ONCE DAILY 90 Tab 1    Calcium-Cholecalciferol, D3, (CALCIUM 600 WITH VITAMIN D3) 600 mg(1,500mg) -400 unit chew Take 1,200 mg by mouth every other day.  loratadine (CLARITIN) 10 mg tablet Take 10 mg by mouth.  nitroglycerin (NITROSTAT) 0.4 mg SL tablet DISSOLVE ONE TABLET UNDER THE TONGUE EVERY 5 MINUTES AS NEEDED FOR CHEST PAIN. DO NOT EXCEED A TOTAL OF 3 DOSES IN 15 MINUTES 50 Tab 0    VOLTAREN 1 % gel APPLY 4G TO AFFECTED AREA FOUR TIMES DAILY 400 g 2    BIOTIN PO Take  by mouth daily.       cholecalciferol, vitamin d3, (VITAMIN D) 1,000 unit tablet Take 2,000 Units by mouth daily.  aspirin delayed-release 81 mg tablet Take 81 mg by mouth daily.  multivitamin (ONE A DAY) tablet Take 1 Tab by mouth daily.  DOCOSAHEXANOIC ACID/EPA (FISH OIL PO) Take  by mouth daily. 4 tabs daily.  cyclosporin (RESTASIS) 0.05 % ophthalmic emulsion Administer 1 Drop to both eyes two (2) times a day. Allergies   Allergen Reactions    Asa-Acetaminophen-Caff-Potass Other (comments)     GI upset    Bextra [Valdecoxib] Other (comments)     GI upset    Celebrex [Celecoxib] Other (comments)     GI upset    Naproxen Rash    Pcn [Penicillins] Swelling    Sulfur Rash     Past Medical History:   Diagnosis Date    Advance care planning 2/17/2016    Discussed with pt today and handout given to pt to complete.  Allergy, unspecified not elsewhere classified 6/26/2010    Arthritis     Atlanto-axial subluxation 3/13/2015    CAD (coronary artery disease)     CHF (congestive heart failure) (HCC) 1/2/2014    Ganglion cyst of wrist     GERD (gastroesophageal reflux disease)     HTN (hypertension) 2/24/2015    Hypercholesterolemia     high LDL    Hypertension     Posterior vitreous detachment     RAD (reactive airway disease) 11/9/2015    Sarcoidosis     Urinary incontinence      No past surgical history on file. No family history on file. ROS      Objective: There were no vitals taken for this visit. General: alert, cooperative, no distress   Mental  status: normal mood, behavior, speech, dress, motor activity, and thought processes, able to follow commands   HENT: NCAT   Neck: no visualized mass   Resp: no respiratory distress   Neuro: no gross deficits   Skin: no discoloration or lesions of concern on visible areas   Psychiatric: normal affect, consistent with stated mood, no evidence of hallucinations     Additional exam findings: We discussed the expected course, resolution and complications of the diagnosis(es) in detail.   Medication risks, benefits, costs, interactions, and alternatives were discussed as indicated. I advised her to contact the office if her condition worsens, changes or fails to improve as anticipated. She expressed understanding with the diagnosis(es) and plan. Triston Sheriff is a 60 y.o. female who was evaluated by a video visit encounter for concerns as above. Patient identification was verified prior to start of the visit. A caregiver was present when appropriate. Due to this being a TeleHealth encounter ([facility]), evaluation of the following organ systems was limited: Vitals/Constitutional/EENT/Resp/CV/GI//MS/Neuro/Skin/Heme-Lymph-Imm. Pursuant to the emergency declaration under the [facility], 1135 waiver authority and the [employer] Act, this Virtual  Visit was conducted, with patient's (and/or legal guardian's) consent, to reduce the patient's risk of exposure to COVID-19 and provide necessary medical care. Services were provided through a video synchronous discussion virtually to substitute for in-person clinic visit. Patient and provider were located at their individual homes.       Tia Gray MD

## 2020-06-24 ENCOUNTER — TELEPHONE (OUTPATIENT)
Dept: FAMILY MEDICINE CLINIC | Age: 65
End: 2020-06-24

## 2020-06-24 NOTE — TELEPHONE ENCOUNTER
Pt is calling because she noticed that after taking her medications today her bp was elevated.  Pt has not experienced and other symptoms pt is just concerned because she had heart attack back in 2009

## 2020-06-25 ENCOUNTER — TELEPHONE (OUTPATIENT)
Dept: FAMILY MEDICINE CLINIC | Age: 65
End: 2020-06-25

## 2020-06-25 RX ORDER — DOXYCYCLINE 100 MG/1
100 TABLET ORAL 2 TIMES DAILY
Qty: 20 TAB | Refills: 0 | Status: SHIPPED | OUTPATIENT
Start: 2020-06-25 | End: 2020-07-05

## 2020-06-25 NOTE — TELEPHONE ENCOUNTER
Spoke with patient she is concerned about her BP readings . This morning it was 124/89 at 7 am this morning p:70 . Yesterday at 10 am 115/76 p 75 . Afternoon 133/95 p 69 then at 1:30 pm 139/94 P 64 , 7pm 98/70 P 75 . She is complaining of feeling about tired/ weakness and a nervous feeling . She saw cardio week before last and had nuclear stress test which look good last Tuesday . I relayed info to Dr Monty Gill and she would like patient to drink plenty of water , take BP 3 times daily . Morning , afternoon and around 7 or 8 pm at night and keep a log . If continues to see elevated or low numbers to call or heart rates below 50 to call and schedule a visit . Patient voiced understanding .

## 2020-06-25 NOTE — TELEPHONE ENCOUNTER
Spoke with pt. She will check her BP BID. Will take the Doxy for her sinusitis x 10 days. Will reduce fish oil to 1 cap daily just during the time she takes the Abx. Will hold other vitamins until Abx is completed then restart. These can interfere with absorption when taking Doxy. All questions were answered.

## 2020-06-29 ENCOUNTER — TELEPHONE (OUTPATIENT)
Dept: FAMILY MEDICINE CLINIC | Age: 65
End: 2020-06-29

## 2020-06-29 ENCOUNTER — OFFICE VISIT (OUTPATIENT)
Dept: FAMILY MEDICINE CLINIC | Age: 65
End: 2020-06-29

## 2020-06-29 VITALS
OXYGEN SATURATION: 99 % | TEMPERATURE: 97.7 F | RESPIRATION RATE: 20 BRPM | DIASTOLIC BLOOD PRESSURE: 74 MMHG | SYSTOLIC BLOOD PRESSURE: 117 MMHG | HEART RATE: 69 BPM | HEIGHT: 63 IN | BODY MASS INDEX: 32.99 KG/M2 | WEIGHT: 186.2 LBS

## 2020-06-29 DIAGNOSIS — J01.00 ACUTE NON-RECURRENT MAXILLARY SINUSITIS: Primary | ICD-10-CM

## 2020-06-29 DIAGNOSIS — I10 ESSENTIAL HYPERTENSION: ICD-10-CM

## 2020-06-29 RX ORDER — NITROGLYCERIN 0.4 MG/1
TABLET SUBLINGUAL
Qty: 50 TAB | Refills: 0 | Status: SHIPPED | OUTPATIENT
Start: 2020-06-29

## 2020-06-29 NOTE — TELEPHONE ENCOUNTER
Pt is calling because she is experiencing light headedness for a couple of days. Pt was offered the first available appt but declined because she would like to be seen in office sooner.  She also stated she was going to call her cardiologist as well

## 2020-06-29 NOTE — PROGRESS NOTES
No ED, saw stress test 3 weeks ago , pt's bp monitor 1030 Bill Jackson is a 60 y.o. female (: 1955) presenting to address:    Chief Complaint   Patient presents with    Sinus Pain    Dizziness       Vitals:    20 1139   BP: 117/74   Pulse: 69   Resp: 20   Temp: 97.7 °F (36.5 °C)   SpO2: 99%   Weight: 186 lb 3.2 oz (84.5 kg)   Height: 5' 3\" (1.6 m)   PainSc:   5   PainLoc: Flank       Hearing/Vision:   No exam data present    Learning Assessment:     Learning Assessment 2015   PRIMARY LEARNER Patient   HIGHEST LEVEL OF EDUCATION - PRIMARY LEARNER  2 YEARS OF COLLEGE   BARRIERS PRIMARY LEARNER NONE   CO-LEARNER CAREGIVER No   PRIMARY LANGUAGE ENGLISH   LEARNER PREFERENCE PRIMARY READING   ANSWERED BY self   RELATIONSHIP SELF     Depression Screening:     3 most recent PHQ Screens 2020   Little interest or pleasure in doing things Not at all   Feeling down, depressed, irritable, or hopeless Not at all   Total Score PHQ 2 0     Fall Risk Assessment:     Fall Risk Assessment, last 12 mths 3/10/2020   Able to walk? Yes   Fall in past 12 months? Yes   Fall with injury? No   Number of falls in past 12 months 2   Fall Risk Score 2     Abuse Screening:     Abuse Screening Questionnaire 3/19/2019   Do you ever feel afraid of your partner? N   Are you in a relationship with someone who physically or mentally threatens you? N   Is it safe for you to go home? Y     Coordination of Care Questionaire:   1. Have you been to the ER, urgent care clinic since your last visit? Hospitalized since your last visit  No     2. Have you seen or consulted any other health care providers outside of the [address] [place] [address] since your last visit? Include any pap smears or colon screening. Dr Alis Julio cardiology stress test 3 weeks ago. Advanced Directive:   1. Do you have an Advanced Directive? NO    2. Would you like information on Advanced Directives?  NO      There are no preventive care reminders to display for this patient.

## 2020-06-29 NOTE — PROGRESS NOTES
Assessment/Plan:    *Diagnoses and all orders for this visit:    1. Acute non-recurrent maxillary sinusitis    2. Essential hypertension    Other orders  -     nitroglycerin (Nitrostat) 0.4 mg SL tablet; DISSOLVE ONE TABLET UNDER THE TONGUE EVERY 5 MINUTES AS NEEDED FOR CHEST PAIN. DO NOT EXCEED A TOTAL OF 3 DOSES IN 15 MINUTES      Advised her to decrease the am Coreg to 1/2 tab and continue 1/2 in the pm.  Will continue to monitor her BP as she has been. Pt asked that we call her cardiologist's office to give them an update. The plan was discussed with the patient. The patient verbalized understanding and is in agreement with the plan. All medication potential side effects were discussed with the patient.    -------------------------------------------------------------------------------------------------------------------        Joann Rutledge is a 60 y.o. female and presents with Sinus Pain and Dizziness         Subjective:  Pt comes today as she has been feeling dizzy and generally not herself. She denies any chest pain or SOB. She tends to check her BP quite regularly, and has noticed that when laying down at night or early AM, her BP and HR, can get low ( 85/54  Hr 38,  82/50  Hr 51,  99/63  Hr 52). This episodes are intermittently occurring. 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 She did so this weekend (when it was quite hot) and felt unwell again. She reports that a customer, who is a nurse, checked her HR and told her to go home and rest.  Pt has been experiencing this over the last couple of weeks. She did reach out to her cardiologist and the PA advised pt to have her sinus issue treated. We gave her a Rx for Doxy which she has been taking but she is still having the same readings. She brought her BP machine in today to have it checked and her reading matched our reading here.   She takes amongst her other meds, Coreg 25 mg 1 tab am and 1/2 tab pm.        Review of Systems - General ROS: positive for  - occasional dizziness         The problem list was updated as a part of today's visit. Patient Active Problem List   Diagnosis Code    Arthritis M19.90    CAD (coronary artery disease) I25.10    GERD (gastroesophageal reflux disease) K21.9    Sarcoidosis D86.9    Ganglion cyst of wrist M67.439    Posterior vitreous detachment H43.819    Hypercholesterolemia E78.00    Urinary incontinence R32    Allergy, unspecified not elsewhere classified T78.40XA    CHF (congestive heart failure) ([facility]) I50.9    HTN (hypertension) I10    Atlanto-axial subluxation S13.120A    RAD (reactive airway disease) J45.909    Advance care planning Z71.89       The PSH, FH were reviewed. SH:  Social History     Tobacco Use    Smoking status: Former Smoker     Last attempt to quit: 1986     Years since quittin.9    Smokeless tobacco: Former User   Substance Use Topics    Alcohol use: No    Drug use: No       Medications/Allergies:  Current Outpatient Medications on File Prior to Visit   Medication Sig Dispense Refill    doxycycline (ADOXA) 100 mg tablet Take 1 Tab by mouth two (2) times a day for 10 days. 20 Tab 0    fluticasone propionate (FLONASE) 50 mcg/actuation nasal spray 2 Sprays by Both Nostrils route daily. 3 Bottle 2    fesoterodine (Toviaz) 8 mg ER tablet Take 1 tablet by mouth once daily 90 Tab 1    meloxicam (MOBIC) 15 mg tablet Take 1 tablet by mouth once daily 90 Tab 1    econazole nitrate (SPECTAZOLE) 1 % topical cream Apply  to affected area two (2) times a day. 30 g 1    famotidine (PEPCID) 20 mg tablet Take 2 Tabs by mouth daily. (Patient taking differently: Take 40 mg by mouth nightly.) 180 Tab 2    levalbuterol tartrate (XOPENEX HFA) 45 mcg/actuation inhaler Take 2 Puffs by inhalation every six (6) hours as needed for Wheezing.  Indications: bronchospasm prevention 2 Inhaler 2    furosemide (LASIX) 20 mg tablet TAKE 1 TABLET BY MOUTH ONCE DAILY 90 Tab 2    PROTONIX 40 mg tablet TAKE 1 TABLET BY MOUTH ONCE DAILY 90 Tab 1    potassium chloride (K-DUR, KLOR-CON) 20 mEq tablet TAKE 1 TABLET BY MOUTH ONCE DAILY 90 Tab 2    rosuvastatin (CRESTOR) 10 mg tablet TAKE 1/2 (ONE-HALF) TABLET BY MOUTH ONCE DAILY IN THE EVENING 45 Tab 2    carvedilol (COREG) 25 mg tablet TAKE 1 TABLET BY MOUTH IN THE MORNING AND 1/2 (ONE-HALF) IN THE EVENING WITH MEALS 135 Tab 2    ammonium lactate (AMLACTIN) 12 % topical cream APPLY CREAM TOPICALLY TO AFFECTED AREA TWICE DAILY. (RUB IN AFFECTED AREA WELL) 385 g 2    valsartan (DIOVAN) 80 mg tablet Take  by mouth daily.  meloxicam (MOBIC) 15 mg tablet TAKE ONE TABLET BY MOUTH ONCE DAILY 90 Tab 1    loratadine (CLARITIN) 10 mg tablet Take 10 mg by mouth.  VOLTAREN 1 % gel APPLY 4G TO AFFECTED AREA FOUR TIMES DAILY 400 g 2    BIOTIN PO Take  by mouth daily.  cholecalciferol, vitamin d3, (VITAMIN D) 1,000 unit tablet Take 2,000 Units by mouth daily.  aspirin delayed-release 81 mg tablet Take 81 mg by mouth daily.  multivitamin (ONE A DAY) tablet Take 1 Tab by mouth daily.  DOCOSAHEXANOIC ACID/EPA (FISH OIL PO) Take  by mouth daily. 4 tabs daily.  cyclosporin (RESTASIS) 0.05 % ophthalmic emulsion Administer 1 Drop to both eyes two (2) times a day.  [DISCONTINUED] nitrofurantoin, macrocrystal-monohydrate, (Macrobid) 100 mg capsule Take 1 Cap by mouth two (2) times a day. 14 Cap 0    [DISCONTINUED] Calcium-Cholecalciferol, D3, (CALCIUM 600 WITH VITAMIN D3) 600 mg(1,500mg) -400 unit chew Take 1,200 mg by mouth every other day.  [DISCONTINUED] nitroglycerin (NITROSTAT) 0.4 mg SL tablet DISSOLVE ONE TABLET UNDER THE TONGUE EVERY 5 MINUTES AS NEEDED FOR CHEST PAIN. DO NOT EXCEED A TOTAL OF 3 DOSES IN 15 MINUTES 50 Tab 0     No current facility-administered medications on file prior to visit.          Allergies   Allergen Reactions    Asa-Acetaminophen-Caff-Potass Other (comments)     GI upset    Bextra [Valdecoxib] Other (comments)     GI upset    Celebrex [Celecoxib] Other (comments)     GI upset    Naproxen Rash    Pcn [Penicillins] Swelling    Sulfur Rash         Health Maintenance:   Health Maintenance   Topic Date Due    Influenza Age 5 to Adult  08/01/2020    Lipid Screen  03/10/2021    Medicare Yearly Exam  03/11/2021    Breast Cancer Screen Mammogram  03/29/2021    Bone Densitometry (Dexa) Screening  04/12/2021    DTaP/Tdap/Td series (2 - Td) 02/24/2025    Colonoscopy  05/31/2026    Hepatitis C Screening  Completed    Shingrix Vaccine Age 50>  Completed    Pneumococcal 0-64 years  Completed       Objective:  Visit Vitals  /74 (BP 1 Location: Left arm, BP Patient Position: Sitting)   Pulse 69   Temp 97.7 °F (36.5 °C)   Resp 20   Ht 5' 3\" (1.6 m)   Wt 186 lb 3.2 oz (84.5 kg)   SpO2 99%   BMI 32.98 kg/m²          Nurses notes and VS reviewed.       Physical Examination: General appearance - alert, well appearing, and in no distress  Chest - clear to auscultation, no wheezes, rales or rhonchi, symmetric air entry  Heart - normal rate and regular rhythm          Labwork and Ancillary Studies:    CBC w/Diff  Lab Results   Component Value Date/Time    WBC 3.3 (L) 03/10/2020 07:43 AM    HGB 13.3 03/10/2020 07:43 AM    PLATELET 303 89/79/6579 07:43 AM         Basic Metabolic Profile  Lab Results   Component Value Date/Time    Sodium 138 03/10/2020 07:43 AM    Potassium 4.5 03/10/2020 07:43 AM    Chloride 103 03/10/2020 07:43 AM    CO2 31 03/10/2020 07:43 AM    Anion gap 4 03/10/2020 07:43 AM    Glucose 86 03/10/2020 07:43 AM    BUN 15 03/10/2020 07:43 AM    Creatinine 1.06 03/10/2020 07:43 AM    BUN/Creatinine ratio 14 03/10/2020 07:43 AM    GFR est AA >60 03/10/2020 07:43 AM    GFR est non-AA 52 (L) 03/10/2020 07:43 AM    Calcium 9.1 03/10/2020 07:43 AM         LFT  Lab Results   Component Value Date/Time    ALT (SGPT) 30 03/10/2020 07:43 AM    Alk.  phosphatase 78 03/10/2020 07:43 AM    Bilirubin, direct 0.3 (H) 03/10/2020 07:43 AM    Bilirubin, total 1.0 03/10/2020 07:43 AM         Cholesterol  Lab Results   Component Value Date/Time    Cholesterol, total 140 03/10/2020 07:43 AM    HDL Cholesterol 55 03/10/2020 07:43 AM    LDL, calculated 72.4 03/10/2020 07:43 AM    Triglyceride 63 03/10/2020 07:43 AM    CHOL/HDL Ratio 2.5 03/10/2020 07:43 AM

## 2020-06-29 NOTE — TELEPHONE ENCOUNTER
Spoke with patient . She was outside on Saturday and when she stood up got dizzy , then felt like she had to urinate but nothing came out when she tried. A nurse checked her out and says he wasn't able to find a pulse in her wrist so she has to check her neck and told her to see needed to go home and get out of the sun . Sunday she felt better still a little tried . Also noticed some left lower back pain . Patient scheduled for 11:30 per Dr Rosalino Loja for evaluation .

## 2020-06-30 ENCOUNTER — TELEPHONE (OUTPATIENT)
Dept: FAMILY MEDICINE CLINIC | Age: 65
End: 2020-06-30

## 2020-06-30 NOTE — TELEPHONE ENCOUNTER
Spoke to Nurse Servando Ellison. She says she talks to pt four times a day. She has told her that with her defibrillator her pulse cannot go below 60. She promises to talk to Dr Roderick Apodaca and will also call the patient. They are fine with us changing her coreg dosing. They understand the pt is concerned.

## 2020-06-30 NOTE — TELEPHONE ENCOUNTER
Patient left message with BP readings this morning. 112/69 with pulse of 38 . Last night is was 107/71 with a pulse of 58 says she only took one tablet of coreg in the morning yesterday no evening dose . I read  Dr Villasenor Fails message to decrease to half a tab twice a day . Patient is still wanting to know what she should do with these two low heart rate numbers .

## 2020-06-30 NOTE — TELEPHONE ENCOUNTER
Called Dr Tj Garg office. Was only able to reach his nurses Cristina Smith's .  Asked them to call us back to discuss this mutual patient regarding her concern for lower pulses, etc.

## 2020-06-30 NOTE — TELEPHONE ENCOUNTER
Tell her to hold the AM Coreg and recheck her BP at 1 pm.  Ultimately, I feel she will need the 1/2 tab BID but we will watch things. She should take the Coreg 1/2 tab in the PM.  Also, we will see what cardiology recommends.

## 2020-08-14 ENCOUNTER — TELEPHONE (OUTPATIENT)
Dept: FAMILY MEDICINE CLINIC | Age: 65
End: 2020-08-14

## 2020-08-14 RX ORDER — FAMOTIDINE 40 MG/1
40 TABLET, FILM COATED ORAL DAILY
Qty: 90 TAB | Refills: 0 | Status: SHIPPED | OUTPATIENT
Start: 2020-08-14 | End: 2020-10-13 | Stop reason: SDUPTHER

## 2020-08-14 NOTE — TELEPHONE ENCOUNTER
Patient is having a hard time getting her Pepcid 20 mg filled at her pharmacy. They only have 40 mg right now so she needs to have the script changed so that she does not go without her medication. Patient made aware that Dr. Wyatt Gutierrez is not in the office today but stressed she wants this to be taken care of today.

## 2020-08-17 ENCOUNTER — TELEPHONE (OUTPATIENT)
Dept: FAMILY MEDICINE CLINIC | Age: 65
End: 2020-08-17

## 2020-08-17 NOTE — TELEPHONE ENCOUNTER
Call pt. Got a refill notification from [facility] that they are out of Pepcid. First, does she actually need this now? If so, can we send it somewhere else so we don't have to change the medication entirely?

## 2020-08-18 NOTE — TELEPHONE ENCOUNTER
I called and spoke with [facility] they had requested 40 mg famotidine because 20 mg was out of stock .  Patient was able to   40 mg script

## 2020-09-11 ENCOUNTER — OFFICE VISIT (OUTPATIENT)
Dept: FAMILY MEDICINE CLINIC | Age: 65
End: 2020-09-11

## 2020-09-11 VITALS
HEIGHT: 63 IN | OXYGEN SATURATION: 98 % | RESPIRATION RATE: 16 BRPM | BODY MASS INDEX: 32.6 KG/M2 | TEMPERATURE: 97.3 F | HEART RATE: 73 BPM | SYSTOLIC BLOOD PRESSURE: 100 MMHG | WEIGHT: 184 LBS | DIASTOLIC BLOOD PRESSURE: 67 MMHG

## 2020-09-11 DIAGNOSIS — I10 ESSENTIAL HYPERTENSION: ICD-10-CM

## 2020-09-11 DIAGNOSIS — E78.00 HYPERCHOLESTEROLEMIA: ICD-10-CM

## 2020-09-11 DIAGNOSIS — L20.82 FLEXURAL ECZEMA: Primary | ICD-10-CM

## 2020-09-11 NOTE — PROGRESS NOTES
Tiki Mcmillan is a 60 y.o. female (: 1955) presenting to address:    Chief Complaint   Patient presents with    Hypertension       Vitals:    20 0744   BP: 100/67   Pulse: 73   Resp: 16   Temp: 97.3 °F (36.3 °C)   TempSrc: Oral   SpO2: 98%   Weight: 184 lb (83.5 kg)   Height: 5' 3\" (1.6 m)   PainSc:   0 - No pain       Hearing/Vision:   No exam data present    Learning Assessment:     Learning Assessment 2015   PRIMARY LEARNER Patient   HIGHEST LEVEL OF EDUCATION - PRIMARY LEARNER  2 YEARS OF COLLEGE   BARRIERS PRIMARY LEARNER NONE   CO-LEARNER CAREGIVER No   PRIMARY LANGUAGE ENGLISH   LEARNER PREFERENCE PRIMARY READING   ANSWERED BY self   RELATIONSHIP SELF     Depression Screening:     3 most recent PHQ Screens 2020   Little interest or pleasure in doing things Not at all   Feeling down, depressed, irritable, or hopeless Not at all   Total Score PHQ 2 0     Fall Risk Assessment:     Fall Risk Assessment, last 12 mths 3/10/2020   Able to walk? Yes   Fall in past 12 months? Yes   Fall with injury? No   Number of falls in past 12 months 2   Fall Risk Score 2     Abuse Screening:     Abuse Screening Questionnaire 3/19/2019   Do you ever feel afraid of your partner? N   Are you in a relationship with someone who physically or mentally threatens you? N   Is it safe for you to go home? Y     Coordination of Care Questionaire:   1. Have you been to the ER, urgent care clinic since your last visit? Hospitalized since your last visit? NO    2. Have you seen or consulted any other health care providers outside of the [address] [place] [address] since your last visit? Include any pap smears or colon screening. Yes Cardio    Advanced Directive:   1. Do you have an Advanced Directive? NO    2. Would you like information on Advanced Directives?  NO

## 2020-09-11 NOTE — PATIENT INSTRUCTIONS
Cholesterol and Triglycerides Tests: About These Tests What are they? Cholesterol and triglycerides tests measure the amount of fats in your blood. These fats have both \"good\" (HDL) and \"bad\" (LDL) cholesterol. Why are these tests done? These tests are done to help find out your risk of a heart attack and stroke. Your doctor uses your cholesterol levels plus other things such as blood pressure, age, and sex to calculate your risk. These tests also help your doctor find out how well medicine is working for some health problems. How do you prepare for these tests? · Your doctor may tell you to fast before your tests. This means that you do not eat or drink anything except water for 9 to 12 hours before the tests. In most cases, you can take your medicines with water the morning of the test. 
· Do not eat high-fat foods the night before the tests. · Do not drink alcohol or do intense exercise the night before the tests. · Tell your doctor ALL the medicines, vitamins, supplements, and herbal remedies you take. Some may increase the risk of problems during your test. Your doctor will tell you if you should stop taking any of them before the test and how soon to do it. How are these tests done? A health professional uses a needle to take a blood sample, usually from the arm. Where can you learn more? Go to http://john-yasir.info/ Enter B103 in the search box to learn more about \"Cholesterol and Triglycerides Tests: About These Tests. \" Current as of: December 16, 2019               Content Version: 12.6 © 5181-5822 [employer]. Care instructions adapted under license by [employer] (which disclaims liability or warranty for this information). If you have questions about a medical condition or this instruction, always ask your healthcare professional. [place] [address] any warranty or liability for your use of this information.

## 2020-09-11 NOTE — PROGRESS NOTES
Assessment/Plan:    *Diagnoses and all orders for this visit:    1. Flexural eczema  -     REFERRAL TO DERMATOLOGY    2. Essential hypertension  -     METABOLIC PANEL, BASIC; Future  -     CBC WITH AUTOMATED DIFF; Future    3. Hypercholesterolemia  -     LIPID PANEL; Future  -     HEPATIC FUNCTION PANEL; Future        Pt will return for labs soon. MWV in 6 months. The plan was discussed with the patient. The patient verbalized understanding and is in agreement with the plan. All medication potential side effects were discussed with the patient.    -------------------------------------------------------------------------------------------------------------------        Cheng Rossi is a 60 y.o. female and presents with Hypertension         Subjective:  Pt seen for f/u. HTN:  Has been doing very well since we adjusted her meds. HLD: has been well controlled but due for f/u. Has had a chronic issue with dry, itchy skin on her elbows. Has been using Amlactin recently but has tried some other creams years ago but none have seemed to help        Review of Systems - General ROS: negative         The problem list was updated as a part of today's visit. Patient Active Problem List   Diagnosis Code    Arthritis M19.90    CAD (coronary artery disease) I25.10    GERD (gastroesophageal reflux disease) K21.9    Sarcoidosis D86.9    Ganglion cyst of wrist M67.439    Posterior vitreous detachment H43.819    Hypercholesterolemia E78.00    Urinary incontinence R32    Allergy, unspecified not elsewhere classified T78.40XA    CH[facility]congest[facility] heart failure) ([facility]) I50.9    HTN (hypertension) I10    Atlanto-axial subluxation S13.120A    RAD (reactive airway disease) J45.909    Advance care planning Z71.89       The PSH, FH were reviewed.         SH:  Social History     Tobacco Use    Smoking status: Former Smoker     Last attempt to quit: 1986     Years since quittin.1    Smokeless tobacco: Former User   Substance Use Topics    Alcohol use: No    Drug use: No       Medications/Allergies:  Current Outpatient Medications on File Prior to Visit   Medication Sig Dispense Refill    famotidine (PEPCID) 40 mg tablet Take 1 Tab by mouth daily. (Patient taking differently: Take 20 mg by mouth daily.) 90 Tab 0    carvediloL (COREG) 25 mg tablet 1/2 tablet twice a day 90 Tab 2    Crestor 10 mg tablet TAKE 1/2 (ONE-HALF) TABLET BY MOUTH IN THE EVENING 45 Tab 2    Protonix 40 mg tablet Take 1 tablet by mouth once daily 90 Tab 2    nitroglycerin (Nitrostat) 0.4 mg SL tablet DISSOLVE ONE TABLET UNDER THE TONGUE EVERY 5 MINUTES AS NEEDED FOR CHEST PAIN. DO NOT EXCEED A TOTAL OF 3 DOSES IN 15 MINUTES 50 Tab 0    fluticasone propionate (FLONASE) 50 mcg/actuation nasal spray 2 Sprays by Both Nostrils route daily. 3 Bottle 2    fesoterodine (Toviaz) 8 mg ER tablet Take 1 tablet by mouth once daily 90 Tab 1    econazole nitrate (SPECTAZOLE) 1 % topical cream Apply  to affected area two (2) times a day. 30 g 1    levalbuterol tartrate (XOPENEX HFA) 45 mcg/actuation inhaler Take 2 Puffs by inhalation every six (6) hours as needed for Wheezing. Indications: bronchospasm prevention 2 Inhaler 2    furosemide (LASIX) 20 mg tablet TAKE 1 TABLET BY MOUTH ONCE DAILY (Patient taking differently: 10 mg. Take 1/2 tab every other day) 90 Tab 2    potassium chloride (K-DUR, KLOR-CON) 20 mEq tablet TAKE 1 TABLET BY MOUTH ONCE DAILY (Patient taking differently: Every other day) 90 Tab 2    ammonium lactate (AMLACTIN) 12 % topical cream APPLY CREAM TOPICALLY TO AFFECTED AREA TWICE DAILY. (RUB IN AFFECTED AREA WELL) 385 g 2    valsartan (DIOVAN) 80 mg tablet Take 40 mg by mouth daily.  meloxicam (MOBIC) 15 mg tablet TAKE ONE TABLET BY MOUTH ONCE DAILY 90 Tab 1    loratadine (CLARITIN) 10 mg tablet Take 10 mg by mouth.       VOLTAREN 1 % gel APPLY 4G TO AFFECTED AREA FOUR TIMES DAILY 400 g 2    BIOTIN PO Take  by mouth daily.      cholecalciferol, vitamin d3, (VITAMIN D) 1,000 unit tablet Take 2,000 Units by mouth daily.  aspirin delayed-release 81 mg tablet Take 81 mg by mouth daily.  multivitamin (ONE A DAY) tablet Take 1 Tab by mouth daily.  DOCOSAHEXANOIC ACID/EPA (FISH OIL PO) Take  by mouth daily. 4 tabs daily.  cyclosporin (RESTASIS) 0.05 % ophthalmic emulsion Administer 1 Drop to both eyes two (2) times a day.  [DISCONTINUED] meloxicam (MOBIC) 15 mg tablet Take 1 tablet by mouth once daily 90 Tab 1     No current facility-administered medications on file prior to visit. Allergies   Allergen Reactions    Asa-Acetaminophen-Caff-Potass Other (comments)     GI upset    Bextra [Valdecoxib] Other (comments)     GI upset    Celebrex [Celecoxib] Other (comments)     GI upset    Naproxen Rash    Pcn [Penicillins] Swelling    Sulfur Rash         Health Maintenance:   Health Maintenance   Topic Date Due    Lipid Screen  03/10/2021    Medicare Yearly Exam  03/11/2021    Breast Cancer Screen Mammogram  03/29/2021    Bone Densitometry (Dexa) Screening  04/12/2021    DTaP/Tdap/Td series (2 - Td) 02/24/2025    Colonoscopy  05/31/2026    Hepatitis C Screening  Completed    Shingrix Vaccine Age 50>  Completed    Flu Vaccine  Completed    Pneumococcal 0-64 years  Completed       Objective:  Visit Vitals  /67   Pulse 73   Temp 97.3 °F (36.3 °C) (Oral)   Resp 16   Ht 5' 3\" (1.6 m)   Wt 184 lb (83.5 kg)   SpO2 98%   BMI 32.59 kg/m²          Nurses notes and VS reviewed.       Physical Examination: General appearance - alert, well appearing, and in no distress  Chest - clear to auscultation, no wheezes, rales or rhonchi, symmetric air entry  Heart - normal rate, regular rhythm, normal S1, S2, no murmurs, rubs, clicks or gallops          Labwork and Ancillary Studies:    CBC w/Diff  Lab Results   Component Value Date/Time    WBC 3.3 (L) 03/10/2020 07:43 AM    HGB 13.3 03/10/2020 07:43 AM PLATELET 858 78/27/0672 07:43 AM         Basic Metabolic Profile  Lab Results   Component Value Date/Time    Sodium 138 03/10/2020 07:43 AM    Potassium 4.5 03/10/2020 07:43 AM    Chloride 103 03/10/2020 07:43 AM    CO2 31 03/10/2020 07:43 AM    Anion gap 4 03/10/2020 07:43 AM    Glucose 86 03/10/2020 07:43 AM    BUN 15 03/10/2020 07:43 AM    Creatinine 1.06 03/10/2020 07:43 AM    BUN/Creatinine ratio 14 03/10/2020 07:43 AM    GFR est AA >60 03/10/2020 07:43 AM    GFR est non-AA 52 (L) 03/10/2020 07:43 AM    Calcium 9.1 03/10/2020 07:43 AM         LFT  Lab Results   Component Value Date/Time    ALT (SGPT) 30 03/10/2020 07:43 AM    Alk.  phosphatase 78 03/10/2020 07:43 AM    Bilirubin, direct 0.3 (H) 03/10/2020 07:43 AM    Bilirubin, total 1.0 03/10/2020 07:43 AM         Cholesterol  Lab Results   Component Value Date/Time    Cholesterol, total 140 03/10/2020 07:43 AM    HDL Cholesterol 55 03/10/2020 07:43 AM    LDL, calculated 72.4 03/10/2020 07:43 AM    Triglyceride 63 03/10/2020 07:43 AM    CHOL/HDL Ratio 2.5 03/10/2020 07:43 AM

## 2020-09-30 ENCOUNTER — APPOINTMENT (OUTPATIENT)
Dept: FAMILY MEDICINE CLINIC | Age: 65
End: 2020-09-30

## 2020-09-30 ENCOUNTER — HOSPITAL ENCOUNTER (OUTPATIENT)
Dept: LAB | Age: 65
Discharge: HOME | End: 2020-09-30
Payer: MEDICARE

## 2020-09-30 DIAGNOSIS — E78.00 HYPERCHOLESTEROLEMIA: ICD-10-CM

## 2020-09-30 DIAGNOSIS — I10 ESSENTIAL HYPERTENSION: ICD-10-CM

## 2020-09-30 LAB
ALBUMIN SERPL-MCNC: 3.3 G/DL (ref 3.4–5)
ALBUMIN/GLOB SERPL: 0.8 {RATIO} (ref 0.8–1.7)
ALP SERPL-CCNC: 81 U/L (ref 45–117)
ALT SERPL-CCNC: 31 U/L (ref 13–56)
ANION GAP SERPL CALC-SCNC: 2 MMOL/L (ref 3–18)
AST SERPL-CCNC: 25 U/L (ref 10–38)
BASOPHILS # BLD: 0 K/UL (ref 0–0.1)
BASOPHILS NFR BLD: 0 % (ref 0–2)
BILIRUB DIRECT SERPL-MCNC: 0.2 MG/DL (ref 0–0.2)
BILIRUB SERPL-MCNC: 0.8 MG/DL (ref 0.2–1)
BUN SERPL-MCNC: 19 MG/DL (ref 7–18)
BUN/CREAT SERPL: 22 (ref 12–20)
CALCIUM SERPL-MCNC: 8.8 MG/DL (ref 8.5–10.1)
CHLORIDE SERPL-SCNC: 106 MMOL/L (ref 100–111)
CHOLEST SERPL-MCNC: 144 MG/DL
CO2 SERPL-SCNC: 31 MMOL/L (ref 21–32)
CREAT SERPL-MCNC: 0.86 MG/DL (ref 0.6–1.3)
DIFFERENTIAL METHOD BLD: ABNORMAL
EOSINOPHIL # BLD: 0.4 K/UL (ref 0–0.4)
EOSINOPHIL NFR BLD: 10 % (ref 0–5)
ERYTHROCYTE [DISTWIDTH] IN BLOOD BY AUTOMATED COUNT: 13.5 % (ref 11.6–14.5)
GLOBULIN SER CALC-MCNC: 4.1 G/DL (ref 2–4)
GLUCOSE SERPL-MCNC: 87 MG/DL (ref 74–99)
HCT VFR BLD AUTO: 39.2 % (ref 35–45)
HDLC SERPL-MCNC: 61 MG/DL (ref 40–60)
HDLC SERPL: 2.4 {RATIO} (ref 0–5)
HGB BLD-MCNC: 12.8 G/DL (ref 12–16)
LDLC SERPL CALC-MCNC: 74.8 MG/DL (ref 0–100)
LIPID PROFILE,FLP: ABNORMAL
LYMPHOCYTES # BLD: 0.7 K/UL (ref 0.9–3.6)
LYMPHOCYTES NFR BLD: 21 % (ref 21–52)
MCH RBC QN AUTO: 28.8 PG (ref 24–34)
MCHC RBC AUTO-ENTMCNC: 32.7 G/DL (ref 31–37)
MCV RBC AUTO: 88.3 FL (ref 74–97)
MONOCYTES # BLD: 0.7 K/UL (ref 0.05–1.2)
MONOCYTES NFR BLD: 20 % (ref 3–10)
NEUTS SEG # BLD: 1.7 K/UL (ref 1.8–8)
NEUTS SEG NFR BLD: 49 % (ref 40–73)
PLATELET # BLD AUTO: 195 K/UL (ref 135–420)
PMV BLD AUTO: 11.5 FL (ref 9.2–11.8)
POTASSIUM SERPL-SCNC: 4.4 MMOL/L (ref 3.5–5.5)
PROT SERPL-MCNC: 7.4 G/DL (ref 6.4–8.2)
RBC # BLD AUTO: 4.44 M/UL (ref 4.2–5.3)
SODIUM SERPL-SCNC: 139 MMOL/L (ref 136–145)
TRIGL SERPL-MCNC: 41 MG/DL (ref ?–150)
VLDLC SERPL CALC-MCNC: 8.2 MG/DL
WBC # BLD AUTO: 3.5 K/UL (ref 4.6–13.2)

## 2020-09-30 PROCEDURE — 80076 HEPATIC FUNCTION PANEL: CPT

## 2020-09-30 PROCEDURE — 80048 BASIC METABOLIC PNL TOTAL CA: CPT

## 2020-09-30 PROCEDURE — 85025 COMPLETE CBC W/AUTO DIFF WBC: CPT

## 2020-09-30 PROCEDURE — 80061 LIPID PANEL: CPT

## 2020-09-30 PROCEDURE — 36415 COLL VENOUS BLD VENIPUNCTURE: CPT

## 2020-10-08 ENCOUNTER — TELEPHONE (OUTPATIENT)
Dept: FAMILY MEDICINE CLINIC | Age: 65
End: 2020-10-08

## 2020-10-08 RX ORDER — AZITHROMYCIN 250 MG/1
TABLET, FILM COATED ORAL
Qty: 6 TAB | Refills: 0 | Status: SHIPPED | OUTPATIENT
Start: 2020-10-08 | End: 2020-10-13

## 2020-10-08 NOTE — TELEPHONE ENCOUNTER
Pt called to request something to be called in for her sinuses. She is currently experiencing a greenish/yellowish phlegm that is draining through her nose/throat, constantly clearing her throat and has pressure in her nose. No future appointments.

## 2020-10-13 NOTE — TELEPHONE ENCOUNTER
Patient called requesting a refill on her medication. Requested Prescriptions     Pending Prescriptions Disp Refills    famotidine (PEPCID) 40 mg tablet 90 Tab 0     Sig: Take 1 Tab by mouth daily. No future appointments.

## 2020-10-14 DIAGNOSIS — N32.81 OAB (OVERACTIVE BLADDER): ICD-10-CM

## 2020-10-14 RX ORDER — FAMOTIDINE 40 MG/1
40 TABLET, FILM COATED ORAL DAILY
Qty: 90 TAB | Refills: 1 | Status: SHIPPED | OUTPATIENT
Start: 2020-10-14 | End: 2020-10-14 | Stop reason: SDUPTHER

## 2020-10-15 DIAGNOSIS — N32.81 OAB (OVERACTIVE BLADDER): ICD-10-CM

## 2020-10-15 RX ORDER — FESOTERODINE FUMARATE 8 MG/1
TABLET, FILM COATED, EXTENDED RELEASE ORAL
Qty: 90 TAB | Refills: 1 | Status: SHIPPED | OUTPATIENT
Start: 2020-10-15

## 2020-10-15 RX ORDER — FESOTERODINE FUMARATE 8 MG/1
TABLET, FILM COATED, EXTENDED RELEASE ORAL
Qty: 90 TAB | Refills: 1 | OUTPATIENT
Start: 2020-10-15

## 2020-10-15 RX ORDER — FAMOTIDINE 40 MG/1
40 TABLET, FILM COATED ORAL DAILY
Qty: 90 TAB | Refills: 1 | Status: SHIPPED | OUTPATIENT
Start: 2020-10-15

## 2020-10-19 DIAGNOSIS — E78.00 HYPERCHOLESTEROLEMIA: ICD-10-CM

## 2020-10-19 NOTE — TELEPHONE ENCOUNTER
Requested Prescriptions     Pending Prescriptions Disp Refills    Crestor 10 mg tablet 45 Tab 2     Sig: TAKE 1/2 (ONE-HALF) TABLET BY MOUTH IN THE EVENING

## 2020-10-20 RX ORDER — ROSUVASTATIN CALCIUM 10 MG/1
TABLET, FILM COATED ORAL
Qty: 45 TAB | Refills: 2 | Status: SHIPPED | OUTPATIENT
Start: 2020-10-20

## 2020-11-18 ENCOUNTER — APPOINTMENT (OUTPATIENT)
Dept: FAMILY MEDICINE CLINIC | Age: 65
End: 2020-11-18

## 2020-11-18 ENCOUNTER — OFFICE VISIT (OUTPATIENT)
Dept: FAMILY MEDICINE CLINIC | Age: 65
End: 2020-11-18
Payer: MEDICARE

## 2020-11-18 VITALS
SYSTOLIC BLOOD PRESSURE: 135 MMHG | OXYGEN SATURATION: 100 % | HEART RATE: 75 BPM | DIASTOLIC BLOOD PRESSURE: 80 MMHG | BODY MASS INDEX: 32.6 KG/M2 | WEIGHT: 184 LBS | RESPIRATION RATE: 16 BRPM | HEIGHT: 63 IN | TEMPERATURE: 97.3 F

## 2020-11-18 DIAGNOSIS — M25.551 ACUTE HIP PAIN, RIGHT: ICD-10-CM

## 2020-11-18 DIAGNOSIS — E78.00 HYPERCHOLESTEROLEMIA: Primary | ICD-10-CM

## 2020-11-18 DIAGNOSIS — I10 ESSENTIAL HYPERTENSION: ICD-10-CM

## 2020-11-18 DIAGNOSIS — R09.81 SINUS CONGESTION: ICD-10-CM

## 2020-11-18 PROCEDURE — G8432 DEP SCR NOT DOC, RNG: HCPCS | Performed by: INTERNAL MEDICINE

## 2020-11-18 PROCEDURE — G8417 CALC BMI ABV UP PARAM F/U: HCPCS | Performed by: INTERNAL MEDICINE

## 2020-11-18 PROCEDURE — G0463 HOSPITAL OUTPT CLINIC VISIT: HCPCS | Performed by: INTERNAL MEDICINE

## 2020-11-18 PROCEDURE — G9899 SCRN MAM PERF RSLTS DOC: HCPCS | Performed by: INTERNAL MEDICINE

## 2020-11-18 PROCEDURE — G8752 SYS BP LESS 140: HCPCS | Performed by: INTERNAL MEDICINE

## 2020-11-18 PROCEDURE — G8754 DIAS BP LESS 90: HCPCS | Performed by: INTERNAL MEDICINE

## 2020-11-18 PROCEDURE — G8427 DOCREV CUR MEDS BY ELIG CLIN: HCPCS | Performed by: INTERNAL MEDICINE

## 2020-11-18 PROCEDURE — 99213 OFFICE O/P EST LOW 20 MIN: CPT | Performed by: INTERNAL MEDICINE

## 2020-11-18 PROCEDURE — 3017F COLORECTAL CA SCREEN DOC REV: CPT | Performed by: INTERNAL MEDICINE

## 2020-11-18 NOTE — PROGRESS NOTES
Assessment/Plan:    *Diagnoses and all orders for this visit:    1. Hypercholesterolemia    2. Essential hypertension    3. Sinus congestion    4. Acute hip pain, right  -     XR HIP RT W OR WO PELV 2-3 VWS; Future      Hip pain DDx: arthritis vs bursitis    The plan was discussed with the patient. The patient verbalized understanding and is in agreement with the plan. All medication potential side effects were discussed with the patient.    -------------------------------------------------------------------------------------------------------------------        Constantine Friend is a 60 y.o. female and presents with Hypertension; Other (Protonix , Xopenex and Toviaz need new prior authorization ); and Nasal Congestion (sinus issues . )         Subjective:  Pt has been well. Has been having pain in the RT hip for 1 week. No injury and pain runs along the side of her leg and down. HTN:  Well controlled. HLD:  Stable on last BW. Has been dealing with her usual sinus congestion, some yellow-green discharge, but this is chronic. Antibiotics never clear it up for any long period of time so we are trying to avoid these. No fevers of headaches. Review of Systems - General ROS: RT hip pain         The problem list was updated as a part of today's visit. Patient Active Problem List   Diagnosis Code    Arthritis M19.90    CAD (coronary artery disease) I25.10    GERD (gastroesophageal reflux disease) K21.9    Sarcoidosis D86.9    Ganglion cyst of wrist M67.439    Posterior vitreous detachment H43.819    Hypercholesterolemia E78.00    Urinary incontinence R32    Allergy, unspecified not elsewhere classified T78.40XA    CHF (congestive heart failure) ([facility]) I50.9    HTN (hypertension) I10    Atlanto-axial subluxation S13.120A    RAD (reactive airway disease) J45.909    Advance care planning Z71.89       The PSH, FH were reviewed.         SH:  Social History     Tobacco Use    Smoking status: Former Smoker     Last attempt to quit: 1986     Years since quittin.3    Smokeless tobacco: Former User   Substance Use Topics    Alcohol use: No    Drug use: No       Medications/Allergies:  Current Outpatient Medications on File Prior to Visit   Medication Sig Dispense Refill    Crestor 10 mg tablet TAKE 1/2 (ONE-HALF) TABLET BY MOUTH IN THE EVENING 45 Tab 2    fesoterodine (Toviaz) 8 mg ER tablet Take 1 tablet by mouth once daily 90 Tab 1    famotidine (PEPCID) 40 mg tablet Take 1 Tab by mouth daily. 90 Tab 1    meloxicam (MOBIC) 15 mg tablet Take 1 tablet by mouth once daily 90 Tab 1    carvediloL (COREG) 25 mg tablet 1/2 tablet twice a day 90 Tab 2    Protonix 40 mg tablet Take 1 tablet by mouth once daily 90 Tab 2    nitroglycerin (Nitrostat) 0.4 mg SL tablet DISSOLVE ONE TABLET UNDER THE TONGUE EVERY 5 MINUTES AS NEEDED FOR CHEST PAIN. DO NOT EXCEED A TOTAL OF 3 DOSES IN 15 MINUTES 50 Tab 0    fluticasone propionate (FLONASE) 50 mcg/actuation nasal spray 2 Sprays by Both Nostrils route daily. 3 Bottle 2    econazole nitrate (SPECTAZOLE) 1 % topical cream Apply  to affected area two (2) times a day. 30 g 1    levalbuterol tartrate (XOPENEX HFA) 45 mcg/actuation inhaler Take 2 Puffs by inhalation every six (6) hours as needed for Wheezing. Indications: bronchospasm prevention 2 Inhaler 2    furosemide (LASIX) 20 mg tablet TAKE 1 TABLET BY MOUTH ONCE DAILY (Patient taking differently: 10 mg. Take 1/2 tab every other day) 90 Tab 2    potassium chloride (K-DUR, KLOR-CON) 20 mEq tablet TAKE 1 TABLET BY MOUTH ONCE DAILY (Patient taking differently: Every other day) 90 Tab 2    ammonium lactate (AMLACTIN) 12 % topical cream APPLY CREAM TOPICALLY TO AFFECTED AREA TWICE DAILY. (RUB IN AFFECTED AREA WELL) 385 g 2    valsartan (DIOVAN) 40 mg tablet Take 40 mg by mouth daily.  loratadine (CLARITIN) 10 mg tablet Take 10 mg by mouth.       VOLTAREN 1 % gel APPLY 4G TO AFFECTED AREA FOUR TIMES DAILY 400 g 2    BIOTIN PO Take  by mouth daily.  cholecalciferol, vitamin d3, (VITAMIN D) 1,000 unit tablet Take 2,000 Units by mouth daily.  aspirin delayed-release 81 mg tablet Take 81 mg by mouth daily.  multivitamin (ONE A DAY) tablet Take 1 Tab by mouth daily.  DOCOSAHEXANOIC ACID/EPA (FISH OIL PO) Take  by mouth daily. 4 tabs daily.  cyclosporin (RESTASIS) 0.05 % ophthalmic emulsion Administer 1 Drop to both eyes two (2) times a day. No current facility-administered medications on file prior to visit. Allergies   Allergen Reactions    Asa-Acetaminophen-Caff-Potass Other (comments)     GI upset    Bextra [Valdecoxib] Other (comments)     GI upset    Celebrex [Celecoxib] Other (comments)     GI upset    Naproxen Rash    Pcn [Penicillins] Swelling    Sulfur Rash         Health Maintenance:   Health Maintenance   Topic Date Due    Medicare Yearly Exam  03/11/2021    Bone Densitometry (Dexa) Screening  04/12/2021    Lipid Screen  09/30/2021    Breast Cancer Screen Mammogram  07/21/2022    DTaP/Tdap/Td series (2 - Td) 02/24/2025    Colorectal Cancer Screening Combo  05/31/2026    Hepatitis C Screening  Completed    Shingrix Vaccine Age 50>  Completed    Flu Vaccine  Completed    Pneumococcal 0-64 years  Completed       Objective:  Visit Vitals  /80   Pulse 75   Temp 97.3 °F (36.3 °C) (Temporal)   Resp 16   Ht 5' 3\" (1.6 m)   Wt 184 lb (83.5 kg)   SpO2 100%   BMI 32.59 kg/m²          Nurses notes and VS reviewed.       Physical Examination: General appearance - alert, well appearing, and in no distress  Chest - clear to auscultation, no wheezes, rales or rhonchi, symmetric air entry  Heart - normal rate, regular rhythm, normal S1, S2, no murmurs, rubs, clicks or gallops  Musculoskeletal - abnormal exam of right hip: pain with weight bearing          Labwork and Ancillary Studies:    CBC w/Diff  Lab Results   Component Value Date/Time    WBC 3.5 (L) 09/30/2020 08:46 AM    HGB 12.8 09/30/2020 08:46 AM    PLATELET 435 02/74/9692 08:46 AM         Basic Metabolic Profile  Lab Results   Component Value Date/Time    Sodium 139 09/30/2020 08:46 AM    Potassium 4.4 09/30/2020 08:46 AM    Chloride 106 09/30/2020 08:46 AM    CO2 31 09/30/2020 08:46 AM    Anion gap 2 (L) 09/30/2020 08:46 AM    Glucose 87 09/30/2020 08:46 AM    BUN 19 (H) 09/30/2020 08:46 AM    Creatinine 0.86 09/30/2020 08:46 AM    BUN/Creatinine ratio 22 (H) 09/30/2020 08:46 AM    GFR est AA >60 09/30/2020 08:46 AM    GFR est non-AA >60 09/30/2020 08:46 AM    Calcium 8.8 09/30/2020 08:46 AM         LFT  Lab Results   Component Value Date/Time    ALT (SGPT) 31 09/30/2020 08:46 AM    Alk.  phosphatase 81 09/30/2020 08:46 AM    Bilirubin, direct 0.2 09/30/2020 08:46 AM    Bilirubin, total 0.8 09/30/2020 08:46 AM         Cholesterol  Lab Results   Component Value Date/Time    Cholesterol, total 144 09/30/2020 08:46 AM    HDL Cholesterol 61 (H) 09/30/2020 08:46 AM    LDL, calculated 74.8 09/30/2020 08:46 AM    Triglyceride 41 09/30/2020 08:46 AM    CHOL/HDL Ratio 2.4 09/30/2020 08:46 AM

## 2020-11-18 NOTE — PROGRESS NOTES
Terrence Devries is a 60 y.o. female (: 1955) presenting to address:    Chief Complaint   Patient presents with    Hypertension    Other     Protonix , Xopenex and Toviaz need new prior authorization     Nasal Congestion     sinus issues . Vitals:    20 0759   BP: 135/80   Pulse: 75   Resp: 16   Temp: 97.3 °F (36.3 °C)   TempSrc: Temporal   SpO2: 100%   Weight: 184 lb (83.5 kg)   Height: 5' 3\" (1.6 m)   PainSc:   3   PainLoc: Hip       Hearing/Vision:   No exam data present    Learning Assessment:     Learning Assessment 2015   PRIMARY LEARNER Patient   HIGHEST LEVEL OF EDUCATION - PRIMARY LEARNER  2 YEARS OF COLLEGE   BARRIERS PRIMARY LEARNER NONE   CO-LEARNER CAREGIVER No   PRIMARY LANGUAGE ENGLISH   LEARNER PREFERENCE PRIMARY READING   ANSWERED BY self   RELATIONSHIP SELF     Depression Screening:     3 most recent PHQ Screens 2020   Little interest or pleasure in doing things Not at all   Feeling down, depressed, irritable, or hopeless Not at all   Total Score PHQ 2 0     Fall Risk Assessment:     Fall Risk Assessment, last 12 mths 3/10/2020   Able to walk? Yes   Fall in past 12 months? Yes   Fall with injury? No   Number of falls in past 12 months 2   Fall Risk Score 2     Abuse Screening:     Abuse Screening Questionnaire 3/19/2019   Do you ever feel afraid of your partner? N   Are you in a relationship with someone who physically or mentally threatens you? N   Is it safe for you to go home? Y     Coordination of Care Questionaire:   1. Have you been to the ER, urgent care clinic since your last visit? Hospitalized since your last visit? NO    2. Have you seen or consulted any other health care providers outside of the [address] [place] [address] since your last visit? Include any pap smears or colon screening. NO    Advanced Directive:   1. Do you have an Advanced Directive? NO    2. Would you like information on Advanced Directives?  NO

## 2020-11-18 NOTE — PATIENT INSTRUCTIONS
Hip Pain: Care Instructions Your Care Instructions Hip pain may be caused by many things, including overuse, a fall, or a twisting movement. Another cause of hip pain is arthritis. Your pain may increase when you stand up, walk, or squat. The pain may come and go or may be constant. Home treatment can help relieve hip pain, swelling, and stiffness. If your pain is ongoing, you may need more tests and treatment. Follow-up care is a key part of your treatment and safety. Be sure to make and go to all appointments, and call your doctor if you are having problems. It's also a good idea to know your test results and keep a list of the medicines you take. How can you care for yourself at home? · Take pain medicines exactly as directed. ? If the doctor gave you a prescription medicine for pain, take it as prescribed. ? If you are not taking a prescription pain medicine, ask your doctor if you can take an over-the-counter medicine. · Rest and protect your hip. Take a break from any activity, including standing or walking, that may cause pain. · Put ice or a cold pack against your hip for 10 to 20 minutes at a time. Try to do this every 1 to 2 hours for the next 3 days (when you are awake) or until the swelling goes down. Put a thin cloth between the ice and your skin. · Sleep on your healthy side with a pillow between your knees, or sleep on your back with pillows under your knees. · If there is no swelling, you can put moist heat, a heating pad, or a warm cloth on your hip. Do gentle stretching exercises to help keep your hip flexible. · Learn how to prevent falls. Have your vision and hearing checked regularly. Wear slippers or shoes with a nonskid sole. · Stay at a healthy weight. · Wear comfortable shoes. When should you call for help? Call 911 anytime you think you may need emergency care. For example, call if: 
  · You have sudden chest pain and shortness of breath, or you cough up blood.   · You are not able to stand or walk or bear weight.  
  · Your buttocks, legs, or feet feel numb or tingly.  
  · Your leg or foot is cool or pale or changes color.  
  · You have severe pain. Call your doctor now or seek immediate medical care if: 
  · You have signs of infection, such as: 
? Increased pain, swelling, warmth, or redness in the hip area. ? Red streaks leading from the hip area. ? Pus draining from the hip area. ? A fever.  
  · You have signs of a blood clot, such as: 
? Pain in your calf, back of the knee, thigh, or groin. ? Redness and swelling in your leg or groin.  
  · You are not able to bend, straighten, or move your leg normally.  
  · You have trouble urinating or having bowel movements. Watch closely for changes in your health, and be sure to contact your doctor if: 
  · You do not get better as expected. Where can you learn more? Go to http://www.[employer].com/ Enter U689 in the search box to learn more about \"Hip Pain: Care Instructions. \" Current as of: June 26, 2019               Content Version: 12.6 © 5071-6746 [employer]. Care instructions adapted under license by [employer] (which disclaims liability or warranty for this information). If you have questions about a medical condition or this instruction, always ask your healthcare professional. [place] [address] any warranty or liability for your use of this information.

## 2020-11-24 ENCOUNTER — TELEPHONE (OUTPATIENT)
Dept: FAMILY MEDICINE CLINIC | Age: 65
End: 2020-11-24

## 2020-11-30 NOTE — TELEPHONE ENCOUNTER
Pharmacy called requesting medication refill, please advise    Requested Prescriptions     Pending Prescriptions Disp Refills    levalbuterol tartrate (XOPENEX) 45 mcg/actuation inhaler [Pharmacy Med Name: Levalbuterol Tartrate 45 MCG/ACT Inhalation Aerosol] 1 Inhaler 0     Sig: INHALE 2 PUFFS BY MOUTH EVERY 6 HOURS AS NEEDED FOR WHEEZING

## 2020-12-01 RX ORDER — LEVALBUTEROL TARTRATE 45 UG/1
AEROSOL, METERED ORAL
Qty: 2 INHALER | Refills: 3 | Status: SHIPPED | OUTPATIENT
Start: 2020-12-01

## 2020-12-01 NOTE — TELEPHONE ENCOUNTER
Pt called to check on the status of her inhaler. She states she's hada  Really rough day yesterday and today w/ being out of breath. Please advise.

## 2021-02-01 DIAGNOSIS — Z23 NEED FOR VACCINATION: ICD-10-CM

## 2021-02-04 ENCOUNTER — IMMUNIZATION (OUTPATIENT)
Dept: LAB | Age: 66
End: 2021-02-04
Attending: HOSPITALIST
Payer: COMMERCIAL

## 2021-02-04 DIAGNOSIS — Z23 NEED FOR VACCINATION: Primary | ICD-10-CM

## 2021-02-04 PROCEDURE — 0001A SARSCOV2 VAC 30MCG/0.3ML IM: CPT

## 2021-02-25 ENCOUNTER — IMMUNIZATION (OUTPATIENT)
Dept: LAB | Age: 66
End: 2021-02-25
Attending: HOSPITALIST
Payer: COMMERCIAL

## 2021-02-25 DIAGNOSIS — Z23 NEED FOR VACCINATION: Primary | ICD-10-CM

## 2021-02-25 PROCEDURE — 0002A SARSCOV2 VAC 30MCG/0.3ML IM: CPT

## 2021-04-28 RX ORDER — FUROSEMIDE 20 MG/1
TABLET ORAL
Qty: 90 TAB | Refills: 0 | Status: SHIPPED | OUTPATIENT
Start: 2021-04-28

## 2021-04-28 NOTE — TELEPHONE ENCOUNTER
Please advise the patient that the requested medication has been refilled for 90 days. Now that Dr. Navin Cleaning has left this practice the patient will need to either establish care with a new provider at this practice or schedule follow-up with Dr. Navin Cleaning at her new practice location to obtain additional refills after this.

## 2021-04-28 NOTE — TELEPHONE ENCOUNTER
Dr. Huber Ibrahim is no longer with [facility]. Patient needs to re-establish care for further refills. Rx written for 90 days w/ 0 refills.

## 2022-12-30 NOTE — TELEPHONE ENCOUNTER
I will change her to Avapro. I have sent it in. She needs to know that it is just her batch that was the issue not that what she has been taking all these years has been an issue. But we will change it. Nsaids Counseling: NSAID Counseling: I discussed with the patient that NSAIDs should be taken with food. Prolonged use of NSAIDs can result in the development of stomach ulcers.  Patient advised to stop taking NSAIDs if abdominal pain occurs.  The patient verbalized understanding of the proper use and possible adverse effects of NSAIDs.  All of the patient's questions and concerns were addressed.